# Patient Record
Sex: FEMALE | Race: WHITE | NOT HISPANIC OR LATINO | URBAN - METROPOLITAN AREA
[De-identification: names, ages, dates, MRNs, and addresses within clinical notes are randomized per-mention and may not be internally consistent; named-entity substitution may affect disease eponyms.]

---

## 2021-06-14 LAB
CYTOLOGY CVX/VAG DOC THIN PREP: NORMAL
HPV16+18+45 E6+E7MRNA CVX NAA+PROBE: NEGATIVE

## 2022-12-26 ENCOUNTER — HOSPITAL ENCOUNTER (EMERGENCY)
Age: 37
Discharge: LEFT BEFORE DISCHARGE | End: 2022-12-26

## 2022-12-26 VITALS
RESPIRATION RATE: 16 BRPM | HEART RATE: 111 BPM | DIASTOLIC BLOOD PRESSURE: 71 MMHG | WEIGHT: 150 LBS | TEMPERATURE: 98.6 F | OXYGEN SATURATION: 97 % | SYSTOLIC BLOOD PRESSURE: 103 MMHG

## 2022-12-26 LAB
ALBUMIN SERPL-MCNC: 3.9 G/DL (ref 3.6–5.1)
ALBUMIN/GLOB SERPL: 1.1 {RATIO} (ref 1–2.4)
ALP SERPL-CCNC: 106 UNITS/L (ref 45–117)
ALT SERPL-CCNC: 30 UNITS/L
ANION GAP SERPL CALC-SCNC: 15 MMOL/L (ref 7–19)
APPEARANCE UR: CLEAR
AST SERPL-CCNC: 19 UNITS/L
BASOPHILS # BLD: 0 K/MCL (ref 0–0.3)
BASOPHILS NFR BLD: 0 %
BILIRUB SERPL-MCNC: 0.3 MG/DL (ref 0.2–1)
BILIRUB UR QL STRIP: NEGATIVE
BUN SERPL-MCNC: 10 MG/DL (ref 6–20)
BUN/CREAT SERPL: 18 (ref 7–25)
CALCIUM SERPL-MCNC: 9.3 MG/DL (ref 8.4–10.2)
CHLORIDE SERPL-SCNC: 100 MMOL/L (ref 97–110)
CO2 SERPL-SCNC: 23 MMOL/L (ref 21–32)
COLOR UR: COLORLESS
CREAT SERPL-MCNC: 0.55 MG/DL (ref 0.51–0.95)
DEPRECATED RDW RBC: 44.5 FL (ref 39–50)
EOSINOPHIL # BLD: 0 K/MCL (ref 0–0.5)
EOSINOPHIL NFR BLD: 0 %
ERYTHROCYTE [DISTWIDTH] IN BLOOD: 13.1 % (ref 11–15)
FASTING DURATION TIME PATIENT: ABNORMAL H
GFR SERPLBLD BASED ON 1.73 SQ M-ARVRAT: >90 ML/MIN
GLOBULIN SER-MCNC: 3.4 G/DL (ref 2–4)
GLUCOSE BLDC GLUCOMTR-MCNC: 336 MG/DL (ref 70–99)
GLUCOSE SERPL-MCNC: 403 MG/DL (ref 70–99)
GLUCOSE UR STRIP-MCNC: >1000 MG/DL
HCT VFR BLD CALC: 37.6 % (ref 36–46.5)
HGB BLD-MCNC: 12.8 G/DL (ref 12–15.5)
HGB UR QL STRIP: NEGATIVE
IMM GRANULOCYTES # BLD AUTO: 0 K/MCL (ref 0–0.2)
IMM GRANULOCYTES # BLD: 0 %
KETONES UR STRIP-MCNC: 40 MG/DL
LEUKOCYTE ESTERASE UR QL STRIP: NEGATIVE
LYMPHOCYTES # BLD: 2 K/MCL (ref 1–4.8)
LYMPHOCYTES NFR BLD: 24 %
MCH RBC QN AUTO: 31.6 PG (ref 26–34)
MCHC RBC AUTO-ENTMCNC: 34 G/DL (ref 32–36.5)
MCV RBC AUTO: 92.8 FL (ref 78–100)
MONOCYTES # BLD: 0.4 K/MCL (ref 0.3–0.9)
MONOCYTES NFR BLD: 4 %
NEUTROPHILS # BLD: 5.7 K/MCL (ref 1.8–7.7)
NEUTROPHILS NFR BLD: 72 %
NITRITE UR QL STRIP: NEGATIVE
NRBC BLD MANUAL-RTO: 0 /100 WBC
PH UR STRIP: 6.5 [PH] (ref 5–7)
PLATELET # BLD AUTO: 306 K/MCL (ref 140–450)
POTASSIUM SERPL-SCNC: 4.3 MMOL/L (ref 3.4–5.1)
PROT SERPL-MCNC: 7.3 G/DL (ref 6.4–8.2)
PROT UR STRIP-MCNC: NEGATIVE MG/DL
RBC # BLD: 4.05 MIL/MCL (ref 4–5.2)
SODIUM SERPL-SCNC: 134 MMOL/L (ref 135–145)
SP GR UR STRIP: 1.03 (ref 1–1.03)
UROBILINOGEN UR STRIP-MCNC: 0.2 MG/DL
WBC # BLD: 8.1 K/MCL (ref 4.2–11)

## 2022-12-26 PROCEDURE — 81003 URINALYSIS AUTO W/O SCOPE: CPT | Performed by: EMERGENCY MEDICINE

## 2022-12-26 PROCEDURE — 82962 GLUCOSE BLOOD TEST: CPT

## 2022-12-26 PROCEDURE — 85025 COMPLETE CBC W/AUTO DIFF WBC: CPT | Performed by: EMERGENCY MEDICINE

## 2022-12-26 PROCEDURE — 80053 COMPREHEN METABOLIC PANEL: CPT | Performed by: EMERGENCY MEDICINE

## 2022-12-26 PROCEDURE — 36415 COLL VENOUS BLD VENIPUNCTURE: CPT

## 2022-12-26 PROCEDURE — 10003627 HB COUNTER ED NO SERVICE

## 2023-02-14 ENCOUNTER — HOSPITAL ENCOUNTER (EMERGENCY)
Age: 38
Discharge: HOME | End: 2023-02-15
Attending: EMERGENCY MEDICINE

## 2023-02-14 DIAGNOSIS — K62.5 RECTAL BLEEDING: ICD-10-CM

## 2023-02-14 DIAGNOSIS — R10.84 GENERALIZED ABDOMINAL PAIN: Primary | ICD-10-CM

## 2023-02-14 DIAGNOSIS — E10.65 UNCONTROLLED TYPE 1 DIABETES MELLITUS WITH HYPERGLYCEMIA (CMD): ICD-10-CM

## 2023-02-14 LAB
APPEARANCE UR: CLEAR
B-HCG UR QL: NEGATIVE
B-OH-BUTYR SERPL-SCNC: 0.1 MMOL/L (ref 0–0.3)
BASOPHILS # BLD: 0 K/MCL (ref 0–0.3)
BASOPHILS NFR BLD: 0 %
BILIRUB UR QL STRIP: NEGATIVE
COLOR UR: COLORLESS
DEPRECATED RDW RBC: 43.9 FL (ref 39–50)
EOSINOPHIL # BLD: 0 K/MCL (ref 0–0.5)
EOSINOPHIL NFR BLD: 1 %
ERYTHROCYTE [DISTWIDTH] IN BLOOD: 13.2 % (ref 11–15)
GLUCOSE BLDC GLUCOMTR-MCNC: 573 MG/DL (ref 70–99)
GLUCOSE UR STRIP-MCNC: >1000 MG/DL
HCT VFR BLD CALC: 37.1 % (ref 36–46.5)
HGB BLD-MCNC: 12.5 G/DL (ref 12–15.5)
HGB UR QL STRIP: NEGATIVE
IMM GRANULOCYTES # BLD AUTO: 0 K/MCL (ref 0–0.2)
IMM GRANULOCYTES # BLD: 0 %
INTERNAL PROCEDURAL CONTROLS ACCEPTABLE: YES
KETONES UR STRIP-MCNC: NEGATIVE MG/DL
LEUKOCYTE ESTERASE UR QL STRIP: NEGATIVE
LYMPHOCYTES # BLD: 1.6 K/MCL (ref 1–4.8)
LYMPHOCYTES NFR BLD: 25 %
MCH RBC QN AUTO: 30.5 PG (ref 26–34)
MCHC RBC AUTO-ENTMCNC: 33.7 G/DL (ref 32–36.5)
MCV RBC AUTO: 90.5 FL (ref 78–100)
MONOCYTES # BLD: 0.4 K/MCL (ref 0.3–0.9)
MONOCYTES NFR BLD: 6 %
NEUTROPHILS # BLD: 4.4 K/MCL (ref 1.8–7.7)
NEUTROPHILS NFR BLD: 68 %
NITRITE UR QL STRIP: NEGATIVE
NRBC BLD MANUAL-RTO: 0 /100 WBC
PH UR STRIP: 6.5 [PH] (ref 5–7)
PLATELET # BLD AUTO: 351 K/MCL (ref 140–450)
PROT UR STRIP-MCNC: NEGATIVE MG/DL
RBC # BLD: 4.1 MIL/MCL (ref 4–5.2)
SP GR UR STRIP: >1.03 (ref 1–1.03)
UROBILINOGEN UR STRIP-MCNC: 0.2 MG/DL
WBC # BLD: 6.5 K/MCL (ref 4.2–11)

## 2023-02-14 PROCEDURE — 83690 ASSAY OF LIPASE: CPT | Performed by: STUDENT IN AN ORGANIZED HEALTH CARE EDUCATION/TRAINING PROGRAM

## 2023-02-14 PROCEDURE — 85025 COMPLETE CBC W/AUTO DIFF WBC: CPT | Performed by: STUDENT IN AN ORGANIZED HEALTH CARE EDUCATION/TRAINING PROGRAM

## 2023-02-14 PROCEDURE — 96375 TX/PRO/DX INJ NEW DRUG ADDON: CPT

## 2023-02-14 PROCEDURE — 81025 URINE PREGNANCY TEST: CPT | Performed by: STUDENT IN AN ORGANIZED HEALTH CARE EDUCATION/TRAINING PROGRAM

## 2023-02-14 PROCEDURE — 80053 COMPREHEN METABOLIC PANEL: CPT | Performed by: STUDENT IN AN ORGANIZED HEALTH CARE EDUCATION/TRAINING PROGRAM

## 2023-02-14 PROCEDURE — 99285 EMERGENCY DEPT VISIT HI MDM: CPT

## 2023-02-14 PROCEDURE — 84702 CHORIONIC GONADOTROPIN TEST: CPT | Performed by: STUDENT IN AN ORGANIZED HEALTH CARE EDUCATION/TRAINING PROGRAM

## 2023-02-14 PROCEDURE — 82962 GLUCOSE BLOOD TEST: CPT

## 2023-02-14 PROCEDURE — 96361 HYDRATE IV INFUSION ADD-ON: CPT

## 2023-02-14 PROCEDURE — 82010 KETONE BODYS QUAN: CPT | Performed by: STUDENT IN AN ORGANIZED HEALTH CARE EDUCATION/TRAINING PROGRAM

## 2023-02-14 PROCEDURE — 81003 URINALYSIS AUTO W/O SCOPE: CPT | Performed by: STUDENT IN AN ORGANIZED HEALTH CARE EDUCATION/TRAINING PROGRAM

## 2023-02-14 PROCEDURE — 96374 THER/PROPH/DIAG INJ IV PUSH: CPT

## 2023-02-14 RX ORDER — INSULIN GLARGINE 100 [IU]/ML
22 INJECTION, SOLUTION SUBCUTANEOUS
COMMUNITY
End: 2023-03-01 | Stop reason: ALTCHOICE

## 2023-02-14 RX ORDER — INSULIN LISPRO 100 [IU]/ML
1-9 INJECTION, SOLUTION INTRAVENOUS; SUBCUTANEOUS
COMMUNITY
Start: 2022-10-03 | End: 2023-03-01 | Stop reason: ALTCHOICE

## 2023-02-14 RX ORDER — ESCITALOPRAM OXALATE 20 MG/1
1 TABLET ORAL DAILY
COMMUNITY

## 2023-02-15 ENCOUNTER — APPOINTMENT (OUTPATIENT)
Dept: CT IMAGING | Age: 38
End: 2023-02-15
Attending: EMERGENCY MEDICINE

## 2023-02-15 VITALS
WEIGHT: 155 LBS | HEART RATE: 71 BPM | OXYGEN SATURATION: 98 % | RESPIRATION RATE: 17 BRPM | DIASTOLIC BLOOD PRESSURE: 72 MMHG | SYSTOLIC BLOOD PRESSURE: 101 MMHG | TEMPERATURE: 98 F

## 2023-02-15 LAB
ALBUMIN SERPL-MCNC: 4 G/DL (ref 3.6–5.1)
ALBUMIN/GLOB SERPL: 1.1 {RATIO} (ref 1–2.4)
ALP SERPL-CCNC: 121 UNITS/L (ref 45–117)
ALT SERPL-CCNC: 23 UNITS/L
ANION GAP SERPL CALC-SCNC: 14 MMOL/L (ref 7–19)
AST SERPL-CCNC: 18 UNITS/L
BILIRUB SERPL-MCNC: 0.3 MG/DL (ref 0.2–1)
BUN SERPL-MCNC: 7 MG/DL (ref 6–20)
BUN/CREAT SERPL: 13 (ref 7–25)
CALCIUM SERPL-MCNC: 9.3 MG/DL (ref 8.4–10.2)
CHLORIDE SERPL-SCNC: 97 MMOL/L (ref 97–110)
CO2 SERPL-SCNC: 22 MMOL/L (ref 21–32)
CREAT SERPL-MCNC: 0.56 MG/DL (ref 0.51–0.95)
FASTING DURATION TIME PATIENT: ABNORMAL H
GFR SERPLBLD BASED ON 1.73 SQ M-ARVRAT: >90 ML/MIN
GLOBULIN SER-MCNC: 3.6 G/DL (ref 2–4)
GLUCOSE BLDC GLUCOMTR-MCNC: 319 MG/DL (ref 70–99)
GLUCOSE SERPL-MCNC: 556 MG/DL (ref 70–99)
HCG SERPL-ACNC: <2 MUNITS/ML
LIPASE SERPL-CCNC: 72 UNITS/L (ref 73–393)
POTASSIUM SERPL-SCNC: 4.1 MMOL/L (ref 3.4–5.1)
PROT SERPL-MCNC: 7.6 G/DL (ref 6.4–8.2)
SODIUM SERPL-SCNC: 129 MMOL/L (ref 135–145)

## 2023-02-15 PROCEDURE — 74177 CT ABD & PELVIS W/CONTRAST: CPT

## 2023-02-15 PROCEDURE — 10002800 HB RX 250 W HCPCS: Performed by: EMERGENCY MEDICINE

## 2023-02-15 PROCEDURE — 96374 THER/PROPH/DIAG INJ IV PUSH: CPT

## 2023-02-15 PROCEDURE — 10002805 HB CONTRAST AGENT: Performed by: EMERGENCY MEDICINE

## 2023-02-15 PROCEDURE — 10002807 HB RX 258: Performed by: EMERGENCY MEDICINE

## 2023-02-15 PROCEDURE — G1004 CDSM NDSC: HCPCS

## 2023-02-15 PROCEDURE — 96375 TX/PRO/DX INJ NEW DRUG ADDON: CPT

## 2023-02-15 PROCEDURE — 82962 GLUCOSE BLOOD TEST: CPT

## 2023-02-15 PROCEDURE — 99285 EMERGENCY DEPT VISIT HI MDM: CPT | Performed by: EMERGENCY MEDICINE

## 2023-02-15 PROCEDURE — 10002803 HB RX 637: Performed by: EMERGENCY MEDICINE

## 2023-02-15 RX ORDER — NALOXONE HYDROCHLORIDE 4 MG/.1ML
SPRAY NASAL
Qty: 2 EACH | Refills: 1 | Status: SHIPPED | OUTPATIENT
Start: 2023-02-15 | End: 2023-03-01 | Stop reason: ALTCHOICE

## 2023-02-15 RX ORDER — DICYCLOMINE HYDROCHLORIDE 10 MG/ML
20 INJECTION INTRAMUSCULAR ONCE
Status: DISCONTINUED | OUTPATIENT
Start: 2023-02-15 | End: 2023-02-15

## 2023-02-15 RX ORDER — DICYCLOMINE HYDROCHLORIDE 10 MG/1
10 CAPSULE ORAL ONCE
Status: COMPLETED | OUTPATIENT
Start: 2023-02-15 | End: 2023-02-15

## 2023-02-15 RX ORDER — TRAMADOL HYDROCHLORIDE 50 MG/1
50 TABLET ORAL EVERY 4 HOURS PRN
Qty: 10 TABLET | Refills: 0 | Status: SHIPPED | OUTPATIENT
Start: 2023-02-15 | End: 2023-03-01 | Stop reason: ALTCHOICE

## 2023-02-15 RX ORDER — KETOROLAC TROMETHAMINE 15 MG/ML
15 INJECTION, SOLUTION INTRAMUSCULAR; INTRAVENOUS ONCE
Status: COMPLETED | OUTPATIENT
Start: 2023-02-15 | End: 2023-02-15

## 2023-02-15 RX ORDER — METOCLOPRAMIDE HYDROCHLORIDE 5 MG/ML
10 INJECTION INTRAMUSCULAR; INTRAVENOUS ONCE
Status: COMPLETED | OUTPATIENT
Start: 2023-02-15 | End: 2023-02-15

## 2023-02-15 RX ADMIN — DICYCLOMINE HYDROCHLORIDE 10 MG: 10 CAPSULE ORAL at 01:14

## 2023-02-15 RX ADMIN — SODIUM CHLORIDE 1000 ML: 9 INJECTION, SOLUTION INTRAVENOUS at 00:36

## 2023-02-15 RX ADMIN — METOCLOPRAMIDE 10 MG: 5 INJECTION, SOLUTION INTRAMUSCULAR; INTRAVENOUS at 01:10

## 2023-02-15 RX ADMIN — KETOROLAC TROMETHAMINE 15 MG: 15 INJECTION, SOLUTION INTRAMUSCULAR; INTRAVENOUS at 01:10

## 2023-02-15 RX ADMIN — IOHEXOL 100 ML: 350 INJECTION, SOLUTION INTRAVENOUS at 03:07

## 2023-02-15 RX ADMIN — HYDROMORPHONE HYDROCHLORIDE 1 MG: 1 INJECTION, SOLUTION INTRAMUSCULAR; INTRAVENOUS; SUBCUTANEOUS at 01:22

## 2023-02-15 ASSESSMENT — ENCOUNTER SYMPTOMS
SHORTNESS OF BREATH: 0
DIARRHEA: 0
HEADACHES: 0
COUGH: 0
DIZZINESS: 0
ABDOMINAL PAIN: 1
RHINORRHEA: 0
FEVER: 0
VOMITING: 1
RECTAL PAIN: 1
CHILLS: 0
BACK PAIN: 0
NAUSEA: 1
BRUISES/BLEEDS EASILY: 0

## 2023-02-15 ASSESSMENT — PAIN SCALES - GENERAL
PAINLEVEL_OUTOF10: 8
PAINLEVEL_OUTOF10: 8
PAINLEVEL_OUTOF10: 0
PAINLEVEL_OUTOF10: 8

## 2023-03-01 ENCOUNTER — HOSPITAL ENCOUNTER (INPATIENT)
Age: 38
LOS: 4 days | Discharge: HOME | DRG: 394 | End: 2023-03-07
Attending: STUDENT IN AN ORGANIZED HEALTH CARE EDUCATION/TRAINING PROGRAM | Admitting: FAMILY MEDICINE

## 2023-03-01 DIAGNOSIS — L02.91 ABSCESS: ICD-10-CM

## 2023-03-01 DIAGNOSIS — L98.8 FISTULA: ICD-10-CM

## 2023-03-01 DIAGNOSIS — L73.2 HIDRADENITIS SUPPURATIVA: ICD-10-CM

## 2023-03-01 DIAGNOSIS — K50.118 CROHN'S DISEASE OF COLON WITH OTHER COMPLICATION (CMD): ICD-10-CM

## 2023-03-01 DIAGNOSIS — L73.2 HIDRADENITIS: Primary | ICD-10-CM

## 2023-03-01 DIAGNOSIS — R73.9 HYPERGLYCEMIA: ICD-10-CM

## 2023-03-01 LAB
ALBUMIN SERPL-MCNC: 3.9 G/DL (ref 3.6–5.1)
ALBUMIN/GLOB SERPL: 1.1 {RATIO} (ref 1–2.4)
ALP SERPL-CCNC: 107 UNITS/L (ref 45–117)
ALT SERPL-CCNC: 34 UNITS/L
ANION GAP SERPL CALC-SCNC: 15 MMOL/L (ref 7–19)
APPEARANCE UR: CLEAR
AST SERPL-CCNC: 10 UNITS/L
B-HCG UR QL: NEGATIVE
BASOPHILS # BLD: 0 K/MCL (ref 0–0.3)
BASOPHILS NFR BLD: 0 %
BILIRUB SERPL-MCNC: 0.3 MG/DL (ref 0.2–1)
BILIRUB UR QL STRIP: NEGATIVE
BUN SERPL-MCNC: 10 MG/DL (ref 6–20)
BUN/CREAT SERPL: 19 (ref 7–25)
CALCIUM SERPL-MCNC: 9.2 MG/DL (ref 8.4–10.2)
CHLORIDE SERPL-SCNC: 100 MMOL/L (ref 97–110)
CO2 SERPL-SCNC: 24 MMOL/L (ref 21–32)
COLOR UR: ABNORMAL
CREAT SERPL-MCNC: 0.53 MG/DL (ref 0.51–0.95)
DEPRECATED RDW RBC: 43 FL (ref 39–50)
EOSINOPHIL # BLD: 0 K/MCL (ref 0–0.5)
EOSINOPHIL NFR BLD: 0 %
ERYTHROCYTE [DISTWIDTH] IN BLOOD: 12.9 % (ref 11–15)
FASTING DURATION TIME PATIENT: ABNORMAL H
GFR SERPLBLD BASED ON 1.73 SQ M-ARVRAT: >90 ML/MIN
GLOBULIN SER-MCNC: 3.6 G/DL (ref 2–4)
GLUCOSE BLDC GLUCOMTR-MCNC: 263 MG/DL (ref 70–99)
GLUCOSE BLDC GLUCOMTR-MCNC: 346 MG/DL (ref 70–99)
GLUCOSE BLDC GLUCOMTR-MCNC: 498 MG/DL (ref 70–99)
GLUCOSE BLDC GLUCOMTR-MCNC: 550 MG/DL (ref 70–99)
GLUCOSE SERPL-MCNC: 373 MG/DL (ref 70–99)
GLUCOSE UR STRIP-MCNC: >1000 MG/DL
HCT VFR BLD CALC: 37.1 % (ref 36–46.5)
HGB BLD-MCNC: 12.2 G/DL (ref 12–15.5)
HGB UR QL STRIP: NEGATIVE
IMM GRANULOCYTES # BLD AUTO: 0 K/MCL (ref 0–0.2)
IMM GRANULOCYTES # BLD: 0 %
INTERNAL PROCEDURAL CONTROLS ACCEPTABLE: YES
KETONES UR STRIP-MCNC: NEGATIVE MG/DL
LEUKOCYTE ESTERASE UR QL STRIP: NEGATIVE
LIPASE SERPL-CCNC: 50 UNITS/L (ref 73–393)
LYMPHOCYTES # BLD: 1.7 K/MCL (ref 1–4.8)
LYMPHOCYTES NFR BLD: 26 %
MCH RBC QN AUTO: 30.3 PG (ref 26–34)
MCHC RBC AUTO-ENTMCNC: 32.9 G/DL (ref 32–36.5)
MCV RBC AUTO: 92.1 FL (ref 78–100)
MONOCYTES # BLD: 0.4 K/MCL (ref 0.3–0.9)
MONOCYTES NFR BLD: 7 %
NEUTROPHILS # BLD: 4.3 K/MCL (ref 1.8–7.7)
NEUTROPHILS NFR BLD: 67 %
NITRITE UR QL STRIP: NEGATIVE
NRBC BLD MANUAL-RTO: 0 /100 WBC
PH UR STRIP: 5.5 [PH] (ref 5–7)
PLATELET # BLD AUTO: 383 K/MCL (ref 140–450)
POTASSIUM SERPL-SCNC: 4.1 MMOL/L (ref 3.4–5.1)
PROT SERPL-MCNC: 7.5 G/DL (ref 6.4–8.2)
PROT UR STRIP-MCNC: NEGATIVE MG/DL
RBC # BLD: 4.03 MIL/MCL (ref 4–5.2)
SODIUM SERPL-SCNC: 135 MMOL/L (ref 135–145)
SP GR UR STRIP: >1.03 (ref 1–1.03)
UROBILINOGEN UR STRIP-MCNC: 0.2 MG/DL
WBC # BLD: 6.4 K/MCL (ref 4.2–11)

## 2023-03-01 PROCEDURE — 10002800 HB RX 250 W HCPCS: Performed by: FAMILY MEDICINE

## 2023-03-01 PROCEDURE — 10004651 HB RX, NO CHARGE ITEM: Performed by: STUDENT IN AN ORGANIZED HEALTH CARE EDUCATION/TRAINING PROGRAM

## 2023-03-01 PROCEDURE — 82962 GLUCOSE BLOOD TEST: CPT

## 2023-03-01 PROCEDURE — G0378 HOSPITAL OBSERVATION PER HR: HCPCS

## 2023-03-01 PROCEDURE — 96374 THER/PROPH/DIAG INJ IV PUSH: CPT

## 2023-03-01 PROCEDURE — 99285 EMERGENCY DEPT VISIT HI MDM: CPT | Performed by: STUDENT IN AN ORGANIZED HEALTH CARE EDUCATION/TRAINING PROGRAM

## 2023-03-01 PROCEDURE — 81025 URINE PREGNANCY TEST: CPT | Performed by: EMERGENCY MEDICINE

## 2023-03-01 PROCEDURE — 80053 COMPREHEN METABOLIC PANEL: CPT | Performed by: EMERGENCY MEDICINE

## 2023-03-01 PROCEDURE — 81003 URINALYSIS AUTO W/O SCOPE: CPT | Performed by: EMERGENCY MEDICINE

## 2023-03-01 PROCEDURE — 96361 HYDRATE IV INFUSION ADD-ON: CPT

## 2023-03-01 PROCEDURE — 10002801 HB RX 250 W/O HCPCS: Performed by: FAMILY MEDICINE

## 2023-03-01 PROCEDURE — 85025 COMPLETE CBC W/AUTO DIFF WBC: CPT | Performed by: EMERGENCY MEDICINE

## 2023-03-01 PROCEDURE — 96376 TX/PRO/DX INJ SAME DRUG ADON: CPT

## 2023-03-01 PROCEDURE — 10002807 HB RX 258: Performed by: FAMILY MEDICINE

## 2023-03-01 PROCEDURE — 10002800 HB RX 250 W HCPCS: Performed by: STUDENT IN AN ORGANIZED HEALTH CARE EDUCATION/TRAINING PROGRAM

## 2023-03-01 PROCEDURE — 99285 EMERGENCY DEPT VISIT HI MDM: CPT

## 2023-03-01 PROCEDURE — 96375 TX/PRO/DX INJ NEW DRUG ADDON: CPT

## 2023-03-01 PROCEDURE — 83690 ASSAY OF LIPASE: CPT | Performed by: STUDENT IN AN ORGANIZED HEALTH CARE EDUCATION/TRAINING PROGRAM

## 2023-03-01 PROCEDURE — 10002807 HB RX 258: Performed by: STUDENT IN AN ORGANIZED HEALTH CARE EDUCATION/TRAINING PROGRAM

## 2023-03-01 PROCEDURE — 96365 THER/PROPH/DIAG IV INF INIT: CPT

## 2023-03-01 RX ORDER — CLINDAMYCIN PHOSPHATE 600 MG/50ML
600 INJECTION INTRAVENOUS EVERY 8 HOURS SCHEDULED
Status: DISCONTINUED | OUTPATIENT
Start: 2023-03-01 | End: 2023-03-02

## 2023-03-01 RX ORDER — ONDANSETRON 2 MG/ML
4 INJECTION INTRAMUSCULAR; INTRAVENOUS EVERY 6 HOURS PRN
Status: DISCONTINUED | OUTPATIENT
Start: 2023-03-01 | End: 2023-03-07 | Stop reason: HOSPADM

## 2023-03-01 RX ORDER — KETOROLAC TROMETHAMINE 15 MG/ML
15 INJECTION, SOLUTION INTRAMUSCULAR; INTRAVENOUS ONCE
Status: COMPLETED | OUTPATIENT
Start: 2023-03-01 | End: 2023-03-01

## 2023-03-01 RX ORDER — NICOTINE POLACRILEX 4 MG
30 LOZENGE BUCCAL PRN
Status: DISCONTINUED | OUTPATIENT
Start: 2023-03-01 | End: 2023-03-07 | Stop reason: HOSPADM

## 2023-03-01 RX ORDER — DOCUSATE SODIUM 100 MG/1
100 CAPSULE, LIQUID FILLED ORAL 2 TIMES DAILY PRN
Status: DISCONTINUED | OUTPATIENT
Start: 2023-03-01 | End: 2023-03-07 | Stop reason: HOSPADM

## 2023-03-01 RX ORDER — LANOLIN ALCOHOL/MO/W.PET/CERES
3 CREAM (GRAM) TOPICAL NIGHTLY PRN
Status: DISCONTINUED | OUTPATIENT
Start: 2023-03-01 | End: 2023-03-07 | Stop reason: HOSPADM

## 2023-03-01 RX ORDER — ONDANSETRON 2 MG/ML
4 INJECTION INTRAMUSCULAR; INTRAVENOUS ONCE
Status: COMPLETED | OUTPATIENT
Start: 2023-03-01 | End: 2023-03-01

## 2023-03-01 RX ORDER — NICOTINE POLACRILEX 4 MG
15 LOZENGE BUCCAL PRN
Status: DISCONTINUED | OUTPATIENT
Start: 2023-03-01 | End: 2023-03-07 | Stop reason: HOSPADM

## 2023-03-01 RX ORDER — DEXTROSE MONOHYDRATE 25 G/50ML
12.5 INJECTION, SOLUTION INTRAVENOUS PRN
Status: DISCONTINUED | OUTPATIENT
Start: 2023-03-01 | End: 2023-03-07 | Stop reason: HOSPADM

## 2023-03-01 RX ORDER — SODIUM CHLORIDE, SODIUM LACTATE, POTASSIUM CHLORIDE, CALCIUM CHLORIDE 600; 310; 30; 20 MG/100ML; MG/100ML; MG/100ML; MG/100ML
INJECTION, SOLUTION INTRAVENOUS CONTINUOUS
Status: DISCONTINUED | OUTPATIENT
Start: 2023-03-01 | End: 2023-03-02

## 2023-03-01 RX ORDER — POLYETHYLENE GLYCOL 3350 17 G/17G
17 POWDER, FOR SOLUTION ORAL DAILY PRN
Status: DISCONTINUED | OUTPATIENT
Start: 2023-03-01 | End: 2023-03-07 | Stop reason: HOSPADM

## 2023-03-01 RX ORDER — 0.9 % SODIUM CHLORIDE 0.9 %
2 VIAL (ML) INJECTION EVERY 12 HOURS SCHEDULED
Status: DISCONTINUED | OUTPATIENT
Start: 2023-03-01 | End: 2023-03-07 | Stop reason: HOSPADM

## 2023-03-01 RX ORDER — ACETAMINOPHEN 325 MG/1
650 TABLET ORAL EVERY 4 HOURS PRN
Status: DISCONTINUED | OUTPATIENT
Start: 2023-03-01 | End: 2023-03-02

## 2023-03-01 RX ORDER — ACETAMINOPHEN 325 MG/1
650 TABLET ORAL ONCE
Status: COMPLETED | OUTPATIENT
Start: 2023-03-01 | End: 2023-03-01

## 2023-03-01 RX ORDER — DEXTROSE MONOHYDRATE 25 G/50ML
25 INJECTION, SOLUTION INTRAVENOUS PRN
Status: DISCONTINUED | OUTPATIENT
Start: 2023-03-01 | End: 2023-03-07 | Stop reason: HOSPADM

## 2023-03-01 RX ORDER — SIMETHICONE 125 MG
125 TABLET,CHEWABLE ORAL 4 TIMES DAILY PRN
Status: DISCONTINUED | OUTPATIENT
Start: 2023-03-01 | End: 2023-03-07 | Stop reason: HOSPADM

## 2023-03-01 RX ORDER — GUAIFENESIN 200 MG/10ML
200 LIQUID ORAL EVERY 4 HOURS PRN
Status: DISCONTINUED | OUTPATIENT
Start: 2023-03-01 | End: 2023-03-07 | Stop reason: HOSPADM

## 2023-03-01 RX ORDER — KETOROLAC TROMETHAMINE 15 MG/ML
15 INJECTION, SOLUTION INTRAMUSCULAR; INTRAVENOUS EVERY 8 HOURS PRN
Status: DISCONTINUED | OUTPATIENT
Start: 2023-03-01 | End: 2023-03-02

## 2023-03-01 RX ADMIN — ACETAMINOPHEN 650 MG: 325 TABLET ORAL at 19:58

## 2023-03-01 RX ADMIN — SODIUM CHLORIDE, POTASSIUM CHLORIDE, SODIUM LACTATE AND CALCIUM CHLORIDE: 600; 310; 30; 20 INJECTION, SOLUTION INTRAVENOUS at 22:10

## 2023-03-01 RX ADMIN — ONDANSETRON 4 MG: 2 INJECTION INTRAMUSCULAR; INTRAVENOUS at 18:13

## 2023-03-01 RX ADMIN — KETOROLAC TROMETHAMINE 15 MG: 15 INJECTION, SOLUTION INTRAMUSCULAR; INTRAVENOUS at 19:57

## 2023-03-01 RX ADMIN — CLINDAMYCIN PHOSPHATE 600 MG: 600 INJECTION, SOLUTION INTRAVENOUS at 23:24

## 2023-03-01 RX ADMIN — SODIUM CHLORIDE, POTASSIUM CHLORIDE, SODIUM LACTATE AND CALCIUM CHLORIDE 1000 ML: 600; 310; 30; 20 INJECTION, SOLUTION INTRAVENOUS at 18:17

## 2023-03-01 RX ADMIN — HYDROMORPHONE HYDROCHLORIDE 0.2 MG: 1 INJECTION, SOLUTION INTRAMUSCULAR; INTRAVENOUS; SUBCUTANEOUS at 22:30

## 2023-03-01 RX ADMIN — SODIUM CHLORIDE 1000 ML: 9 INJECTION, SOLUTION INTRAVENOUS at 22:17

## 2023-03-01 RX ADMIN — SODIUM CHLORIDE, POTASSIUM CHLORIDE, SODIUM LACTATE AND CALCIUM CHLORIDE 1000 ML: 600; 310; 30; 20 INJECTION, SOLUTION INTRAVENOUS at 19:57

## 2023-03-01 RX ADMIN — HYDROMORPHONE HYDROCHLORIDE 1 MG: 1 INJECTION, SOLUTION INTRAMUSCULAR; INTRAVENOUS; SUBCUTANEOUS at 19:03

## 2023-03-01 RX ADMIN — INSULIN HUMAN 10 UNITS: 100 INJECTION, SOLUTION PARENTERAL at 18:15

## 2023-03-01 RX ADMIN — INSULIN LISPRO 2 UNITS: 100 INJECTION, SOLUTION INTRAVENOUS; SUBCUTANEOUS at 23:38

## 2023-03-01 SDOH — SOCIAL STABILITY: SOCIAL NETWORK: SUPPORT SYSTEMS: SPOUSE;FAMILY MEMBERS

## 2023-03-01 SDOH — ECONOMIC STABILITY: HOUSING INSECURITY: WHAT IS YOUR LIVING SITUATION TODAY?: SPOUSE;CHILDREN

## 2023-03-01 SDOH — HEALTH STABILITY: PHYSICAL HEALTH: DO YOU HAVE SERIOUS DIFFICULTY WALKING OR CLIMBING STAIRS?: NO

## 2023-03-01 SDOH — ECONOMIC STABILITY: GENERAL

## 2023-03-01 SDOH — SOCIAL STABILITY: SOCIAL NETWORK
HOW OFTEN DO YOU SEE OR TALK TO PEOPLE THAT YOU CARE ABOUT AND FEEL CLOSE TO? (FOR EXAMPLE: TALKING TO FRIENDS ON THE PHONE, VISITING FRIENDS OR FAMILY, GOING TO CHURCH OR CLUB MEETINGS): 5 OR MORE TIMES A WEEK

## 2023-03-01 SDOH — ECONOMIC STABILITY: HOUSING INSECURITY: ARE YOU WORRIED ABOUT LOSING YOUR HOUSING?: NO

## 2023-03-01 SDOH — HEALTH STABILITY: GENERAL: BECAUSE OF A PHYSICAL, MENTAL, OR EMOTIONAL CONDITION, DO YOU HAVE DIFFICULTY DOING ERRANDS ALONE?: NO

## 2023-03-01 SDOH — HEALTH STABILITY: PHYSICAL HEALTH: DO YOU HAVE DIFFICULTY DRESSING OR BATHING?: NO

## 2023-03-01 SDOH — ECONOMIC STABILITY: FOOD INSECURITY: HOW OFTEN IN THE PAST 12 MONTHS WERE YOU WORRIED OR STRESSED ABOUT HAVING ENOUGH MONEY TO BUY NUTRITIOUS MEALS?: NEVER

## 2023-03-01 SDOH — ECONOMIC STABILITY: TRANSPORTATION INSECURITY
IN THE PAST 12 MONTHS, HAS LACK OF TRANSPORTATION KEPT YOU FROM MEETINGS, WORK, OR FROM GETTING THINGS NEEDED FOR DAILY LIVING?: NO

## 2023-03-01 SDOH — HEALTH STABILITY: GENERAL
BECAUSE OF A PHYSICAL, MENTAL, OR EMOTIONAL CONDITION, DO YOU HAVE SERIOUS DIFFICULTY CONCENTRATING, REMEMBERING OR MAKING DECISIONS?: NO

## 2023-03-01 SDOH — ECONOMIC STABILITY: HOUSING INSECURITY: WHAT IS YOUR LIVING SITUATION TODAY?: HOUSE

## 2023-03-01 SDOH — ECONOMIC STABILITY: TRANSPORTATION INSECURITY
IN THE PAST 12 MONTHS, HAS THE LACK OF TRANSPORTATION KEPT YOU FROM MEDICAL APPOINTMENTS OR FROM GETTING MEDICATIONS?: NO

## 2023-03-01 ASSESSMENT — ACTIVITIES OF DAILY LIVING (ADL)
ADL_SCORE: 12
ADL_SHORT_OF_BREATH: NO
ADL_BEFORE_ADMISSION: INDEPENDENT
RECENT_DECLINE_ADL: NO

## 2023-03-01 ASSESSMENT — PAIN SCALES - GENERAL
PAINLEVEL_OUTOF10: 10
PAINLEVEL_OUTOF10: 8

## 2023-03-01 ASSESSMENT — LIFESTYLE VARIABLES
HOW OFTEN DO YOU HAVE A DRINK CONTAINING ALCOHOL: NEVER
HOW OFTEN DO YOU HAVE 6 OR MORE DRINKS ON ONE OCCASION: NEVER
AUDIT-C TOTAL SCORE: 0
HOW MANY STANDARD DRINKS CONTAINING ALCOHOL DO YOU HAVE ON A TYPICAL DAY: 0,1 OR 2
ALCOHOL_USE_STATUS: NO OR LOW RISK WITH VALIDATED TOOL

## 2023-03-01 ASSESSMENT — PATIENT HEALTH QUESTIONNAIRE - PHQ9
SUM OF ALL RESPONSES TO PHQ9 QUESTIONS 1 AND 2: 0
IS PATIENT ABLE TO COMPLETE PHQ2 OR PHQ9: YES
2. FEELING DOWN, DEPRESSED OR HOPELESS: NOT AT ALL
CLINICAL INTERPRETATION OF PHQ2 SCORE: NO FURTHER SCREENING NEEDED
SUM OF ALL RESPONSES TO PHQ9 QUESTIONS 1 AND 2: 0
1. LITTLE INTEREST OR PLEASURE IN DOING THINGS: NOT AT ALL

## 2023-03-01 ASSESSMENT — COLUMBIA-SUICIDE SEVERITY RATING SCALE - C-SSRS
1. WITHIN THE PAST MONTH, HAVE YOU WISHED YOU WERE DEAD OR WISHED YOU COULD GO TO SLEEP AND NOT WAKE UP?: NO
IS THE PATIENT ABLE TO COMPLETE C-SSRS: YES
2. HAVE YOU ACTUALLY HAD ANY THOUGHTS OF KILLING YOURSELF?: NO
6. HAVE YOU EVER DONE ANYTHING, STARTED TO DO ANYTHING, OR PREPARED TO DO ANYTHING TO END YOUR LIFE?: NO

## 2023-03-02 ENCOUNTER — APPOINTMENT (OUTPATIENT)
Dept: CT IMAGING | Age: 38
DRG: 394 | End: 2023-03-02
Attending: INTERNAL MEDICINE

## 2023-03-02 LAB
ALBUMIN SERPL-MCNC: 2.9 G/DL (ref 3.6–5.1)
ALBUMIN/GLOB SERPL: 1 {RATIO} (ref 1–2.4)
ALP SERPL-CCNC: 90 UNITS/L (ref 45–117)
ALT SERPL-CCNC: 24 UNITS/L
ANION GAP SERPL CALC-SCNC: 10 MMOL/L (ref 7–19)
AST SERPL-CCNC: 10 UNITS/L
BASOPHILS # BLD: 0 K/MCL (ref 0–0.3)
BASOPHILS NFR BLD: 1 %
BILIRUB SERPL-MCNC: 0.3 MG/DL (ref 0.2–1)
BUN SERPL-MCNC: 12 MG/DL (ref 6–20)
BUN/CREAT SERPL: 21 (ref 7–25)
CALCIUM SERPL-MCNC: 8.4 MG/DL (ref 8.4–10.2)
CHLORIDE SERPL-SCNC: 105 MMOL/L (ref 97–110)
CK SERPL-CCNC: 68 UNITS/L (ref 26–192)
CO2 SERPL-SCNC: 26 MMOL/L (ref 21–32)
CREAT SERPL-MCNC: 0.56 MG/DL (ref 0.51–0.95)
DEPRECATED RDW RBC: 46.3 FL (ref 39–50)
EOSINOPHIL # BLD: 0.1 K/MCL (ref 0–0.5)
EOSINOPHIL NFR BLD: 1 %
ERYTHROCYTE [DISTWIDTH] IN BLOOD: 13.2 % (ref 11–15)
FASTING DURATION TIME PATIENT: ABNORMAL H
GFR SERPLBLD BASED ON 1.73 SQ M-ARVRAT: >90 ML/MIN
GLOBULIN SER-MCNC: 2.9 G/DL (ref 2–4)
GLUCOSE BLDC GLUCOMTR-MCNC: 244 MG/DL (ref 70–99)
GLUCOSE BLDC GLUCOMTR-MCNC: 261 MG/DL (ref 70–99)
GLUCOSE BLDC GLUCOMTR-MCNC: 323 MG/DL (ref 70–99)
GLUCOSE BLDC GLUCOMTR-MCNC: 422 MG/DL (ref 70–99)
GLUCOSE SERPL-MCNC: 374 MG/DL (ref 70–99)
HBA1C MFR BLD: 9.6 % (ref 4.5–5.6)
HCT VFR BLD CALC: 32.2 % (ref 36–46.5)
HGB BLD-MCNC: 10.3 G/DL (ref 12–15.5)
HYPOCHROMIA BLD QL SMEAR: NORMAL
IMM GRANULOCYTES # BLD AUTO: 0 K/MCL (ref 0–0.2)
IMM GRANULOCYTES # BLD: 0 %
LYMPHOCYTES # BLD: 2.1 K/MCL (ref 1–4.8)
LYMPHOCYTES NFR BLD: 44 %
MAGNESIUM SERPL-MCNC: 1.7 MG/DL (ref 1.7–2.4)
MCH RBC QN AUTO: 30.6 PG (ref 26–34)
MCHC RBC AUTO-ENTMCNC: 32 G/DL (ref 32–36.5)
MCV RBC AUTO: 95.5 FL (ref 78–100)
MICROCYTES BLD QL SMEAR: NORMAL
MONOCYTES # BLD: 0.4 K/MCL (ref 0.3–0.9)
MONOCYTES NFR BLD: 9 %
NEUTROPHILS # BLD: 2.2 K/MCL (ref 1.8–7.7)
NEUTROPHILS NFR BLD: 45 %
NRBC BLD MANUAL-RTO: 0 /100 WBC
OVALOCYTES BLD QL SMEAR: NORMAL
PLAT MORPH BLD: NORMAL
PLATELET # BLD AUTO: 337 K/MCL (ref 140–450)
POTASSIUM SERPL-SCNC: 4.2 MMOL/L (ref 3.4–5.1)
PROT SERPL-MCNC: 5.8 G/DL (ref 6.4–8.2)
RBC # BLD: 3.37 MIL/MCL (ref 4–5.2)
SODIUM SERPL-SCNC: 137 MMOL/L (ref 135–145)
WBC # BLD: 4.8 K/MCL (ref 4.2–11)

## 2023-03-02 PROCEDURE — 85025 COMPLETE CBC W/AUTO DIFF WBC: CPT | Performed by: FAMILY MEDICINE

## 2023-03-02 PROCEDURE — 10002800 HB RX 250 W HCPCS: Performed by: STUDENT IN AN ORGANIZED HEALTH CARE EDUCATION/TRAINING PROGRAM

## 2023-03-02 PROCEDURE — 80053 COMPREHEN METABOLIC PANEL: CPT | Performed by: FAMILY MEDICINE

## 2023-03-02 PROCEDURE — G0378 HOSPITAL OBSERVATION PER HR: HCPCS

## 2023-03-02 PROCEDURE — 10002800 HB RX 250 W HCPCS: Performed by: FAMILY MEDICINE

## 2023-03-02 PROCEDURE — 10002800 HB RX 250 W HCPCS: Performed by: INTERNAL MEDICINE

## 2023-03-02 PROCEDURE — 74177 CT ABD & PELVIS W/CONTRAST: CPT

## 2023-03-02 PROCEDURE — 10004651 HB RX, NO CHARGE ITEM: Performed by: FAMILY MEDICINE

## 2023-03-02 PROCEDURE — 10002807 HB RX 258: Performed by: INTERNAL MEDICINE

## 2023-03-02 PROCEDURE — 99254 IP/OBS CNSLTJ NEW/EST MOD 60: CPT | Performed by: NURSE PRACTITIONER

## 2023-03-02 PROCEDURE — 83036 HEMOGLOBIN GLYCOSYLATED A1C: CPT | Performed by: FAMILY MEDICINE

## 2023-03-02 PROCEDURE — 10002807 HB RX 258: Performed by: FAMILY MEDICINE

## 2023-03-02 PROCEDURE — 82962 GLUCOSE BLOOD TEST: CPT

## 2023-03-02 PROCEDURE — 36415 COLL VENOUS BLD VENIPUNCTURE: CPT | Performed by: FAMILY MEDICINE

## 2023-03-02 PROCEDURE — G1004 CDSM NDSC: HCPCS

## 2023-03-02 PROCEDURE — 96372 THER/PROPH/DIAG INJ SC/IM: CPT

## 2023-03-02 PROCEDURE — 10002805 HB CONTRAST AGENT: Performed by: INTERNAL MEDICINE

## 2023-03-02 PROCEDURE — 83735 ASSAY OF MAGNESIUM: CPT | Performed by: FAMILY MEDICINE

## 2023-03-02 PROCEDURE — 10002801 HB RX 250 W/O HCPCS: Performed by: FAMILY MEDICINE

## 2023-03-02 PROCEDURE — 96375 TX/PRO/DX INJ NEW DRUG ADDON: CPT

## 2023-03-02 PROCEDURE — 82550 ASSAY OF CK (CPK): CPT | Performed by: INTERNAL MEDICINE

## 2023-03-02 PROCEDURE — 96376 TX/PRO/DX INJ SAME DRUG ADON: CPT

## 2023-03-02 PROCEDURE — 99223 1ST HOSP IP/OBS HIGH 75: CPT | Performed by: STUDENT IN AN ORGANIZED HEALTH CARE EDUCATION/TRAINING PROGRAM

## 2023-03-02 RX ORDER — INSULIN GLARGINE 100 [IU]/ML
10 INJECTION, SOLUTION SUBCUTANEOUS DAILY
Status: DISCONTINUED | OUTPATIENT
Start: 2023-03-02 | End: 2023-03-03

## 2023-03-02 RX ORDER — ACETAMINOPHEN 325 MG/1
650 TABLET ORAL EVERY 4 HOURS PRN
Status: DISCONTINUED | OUTPATIENT
Start: 2023-03-02 | End: 2023-03-07 | Stop reason: HOSPADM

## 2023-03-02 RX ORDER — DAPTOMYCIN 50 MG/ML
6 INJECTION, POWDER, LYOPHILIZED, FOR SOLUTION INTRAVENOUS EVERY 24 HOURS
Status: DISCONTINUED | OUTPATIENT
Start: 2023-03-02 | End: 2023-03-03

## 2023-03-02 RX ORDER — KETOROLAC TROMETHAMINE 15 MG/ML
15 INJECTION, SOLUTION INTRAMUSCULAR; INTRAVENOUS EVERY 8 HOURS PRN
Status: DISPENSED | OUTPATIENT
Start: 2023-03-02 | End: 2023-03-06

## 2023-03-02 RX ORDER — HEPARIN SODIUM 5000 [USP'U]/ML
5000 INJECTION, SOLUTION INTRAVENOUS; SUBCUTANEOUS EVERY 8 HOURS SCHEDULED
Status: DISCONTINUED | OUTPATIENT
Start: 2023-03-02 | End: 2023-03-07 | Stop reason: HOSPADM

## 2023-03-02 RX ADMIN — IOHEXOL 75 ML: 350 INJECTION, SOLUTION INTRAVENOUS at 20:07

## 2023-03-02 RX ADMIN — INSULIN GLARGINE 10 UNITS: 100 INJECTION, SOLUTION SUBCUTANEOUS at 16:37

## 2023-03-02 RX ADMIN — PIPERACILLIN SODIUM AND TAZOBACTAM SODIUM 3.38 G: 3; .375 INJECTION, POWDER, FOR SOLUTION INTRAVENOUS at 21:36

## 2023-03-02 RX ADMIN — DAPTOMYCIN 360 MG: 500 INJECTION, POWDER, LYOPHILIZED, FOR SOLUTION INTRAVENOUS at 16:38

## 2023-03-02 RX ADMIN — HYDROMORPHONE HYDROCHLORIDE 0.4 MG: 1 INJECTION, SOLUTION INTRAMUSCULAR; INTRAVENOUS; SUBCUTANEOUS at 12:22

## 2023-03-02 RX ADMIN — HEPARIN SODIUM 5000 UNITS: 5000 INJECTION INTRAVENOUS; SUBCUTANEOUS at 16:37

## 2023-03-02 RX ADMIN — CLINDAMYCIN PHOSPHATE 600 MG: 600 INJECTION, SOLUTION INTRAVENOUS at 06:24

## 2023-03-02 RX ADMIN — HYDROMORPHONE HYDROCHLORIDE 0.2 MG: 1 INJECTION, SOLUTION INTRAMUSCULAR; INTRAVENOUS; SUBCUTANEOUS at 03:25

## 2023-03-02 RX ADMIN — INSULIN LISPRO 8 UNITS: 100 INJECTION, SOLUTION INTRAVENOUS; SUBCUTANEOUS at 16:42

## 2023-03-02 RX ADMIN — SODIUM CHLORIDE, PRESERVATIVE FREE 2 ML: 5 INJECTION INTRAVENOUS at 08:39

## 2023-03-02 RX ADMIN — HYDROMORPHONE HYDROCHLORIDE 0.2 MG: 1 INJECTION, SOLUTION INTRAMUSCULAR; INTRAVENOUS; SUBCUTANEOUS at 08:36

## 2023-03-02 RX ADMIN — HYDROMORPHONE HYDROCHLORIDE 0.4 MG: 1 INJECTION, SOLUTION INTRAMUSCULAR; INTRAVENOUS; SUBCUTANEOUS at 16:42

## 2023-03-02 RX ADMIN — SODIUM CHLORIDE, POTASSIUM CHLORIDE, SODIUM LACTATE AND CALCIUM CHLORIDE: 600; 310; 30; 20 INJECTION, SOLUTION INTRAVENOUS at 03:32

## 2023-03-02 RX ADMIN — HYDROMORPHONE HYDROCHLORIDE 0.4 MG: 1 INJECTION, SOLUTION INTRAMUSCULAR; INTRAVENOUS; SUBCUTANEOUS at 19:30

## 2023-03-02 RX ADMIN — HYDROMORPHONE HYDROCHLORIDE 0.2 MG: 1 INJECTION, SOLUTION INTRAMUSCULAR; INTRAVENOUS; SUBCUTANEOUS at 00:47

## 2023-03-02 RX ADMIN — PIPERACILLIN SODIUM AND TAZOBACTAM SODIUM 3.38 G: 3; .375 INJECTION, POWDER, FOR SOLUTION INTRAVENOUS at 12:18

## 2023-03-02 RX ADMIN — INSULIN LISPRO 10 UNITS: 100 INJECTION, SOLUTION INTRAVENOUS; SUBCUTANEOUS at 12:28

## 2023-03-02 ASSESSMENT — PATIENT HEALTH QUESTIONNAIRE - PHQ9
CLINICAL INTERPRETATION OF PHQ2 SCORE: NO FURTHER SCREENING NEEDED
SUM OF ALL RESPONSES TO PHQ9 QUESTIONS 1 AND 2: 0
IS PATIENT ABLE TO COMPLETE PHQ2 OR PHQ9: YES

## 2023-03-02 ASSESSMENT — PAIN SCALES - GENERAL
PAINLEVEL_OUTOF10: 7
PAINLEVEL_OUTOF10: 6
PAINLEVEL_OUTOF10: 7
PAINLEVEL_OUTOF10: 6

## 2023-03-03 LAB
GLUCOSE BLDC GLUCOMTR-MCNC: 116 MG/DL (ref 70–99)
GLUCOSE BLDC GLUCOMTR-MCNC: 214 MG/DL (ref 70–99)
GLUCOSE BLDC GLUCOMTR-MCNC: 242 MG/DL (ref 70–99)
GLUCOSE BLDC GLUCOMTR-MCNC: 303 MG/DL (ref 70–99)

## 2023-03-03 PROCEDURE — 10004281 HB COUNTER-STAFF TIME PER 15 MIN

## 2023-03-03 PROCEDURE — 13003287

## 2023-03-03 PROCEDURE — 10002807 HB RX 258: Performed by: INTERNAL MEDICINE

## 2023-03-03 PROCEDURE — 99233 SBSQ HOSP IP/OBS HIGH 50: CPT | Performed by: STUDENT IN AN ORGANIZED HEALTH CARE EDUCATION/TRAINING PROGRAM

## 2023-03-03 PROCEDURE — 10004651 HB RX, NO CHARGE ITEM: Performed by: FAMILY MEDICINE

## 2023-03-03 PROCEDURE — 10002800 HB RX 250 W HCPCS: Performed by: INTERNAL MEDICINE

## 2023-03-03 PROCEDURE — 10002803 HB RX 637: Performed by: STUDENT IN AN ORGANIZED HEALTH CARE EDUCATION/TRAINING PROGRAM

## 2023-03-03 PROCEDURE — 10002800 HB RX 250 W HCPCS: Performed by: STUDENT IN AN ORGANIZED HEALTH CARE EDUCATION/TRAINING PROGRAM

## 2023-03-03 PROCEDURE — 82962 GLUCOSE BLOOD TEST: CPT

## 2023-03-03 PROCEDURE — 96376 TX/PRO/DX INJ SAME DRUG ADON: CPT

## 2023-03-03 PROCEDURE — 99233 SBSQ HOSP IP/OBS HIGH 50: CPT | Performed by: NURSE PRACTITIONER

## 2023-03-03 PROCEDURE — 10002800 HB RX 250 W HCPCS: Performed by: FAMILY MEDICINE

## 2023-03-03 PROCEDURE — 10006031 HB ROOM CHARGE TELEMETRY

## 2023-03-03 PROCEDURE — G0378 HOSPITAL OBSERVATION PER HR: HCPCS

## 2023-03-03 RX ORDER — ESCITALOPRAM OXALATE 10 MG/1
20 TABLET ORAL DAILY
Status: DISCONTINUED | OUTPATIENT
Start: 2023-03-03 | End: 2023-03-07 | Stop reason: HOSPADM

## 2023-03-03 RX ORDER — INSULIN LISPRO 100 [IU]/ML
4 INJECTION, SOLUTION INTRAVENOUS; SUBCUTANEOUS ONCE
Status: COMPLETED | OUTPATIENT
Start: 2023-03-03 | End: 2023-03-03

## 2023-03-03 RX ORDER — INSULIN GLARGINE 100 [IU]/ML
15 INJECTION, SOLUTION SUBCUTANEOUS DAILY
Status: DISCONTINUED | OUTPATIENT
Start: 2023-03-03 | End: 2023-03-07 | Stop reason: HOSPADM

## 2023-03-03 RX ADMIN — INSULIN LISPRO 4 UNITS: 100 INJECTION, SOLUTION INTRAVENOUS; SUBCUTANEOUS at 01:09

## 2023-03-03 RX ADMIN — KETOROLAC TROMETHAMINE 15 MG: 15 INJECTION, SOLUTION INTRAMUSCULAR; INTRAVENOUS at 17:45

## 2023-03-03 RX ADMIN — HYDROMORPHONE HYDROCHLORIDE 0.5 MG: 1 INJECTION, SOLUTION INTRAMUSCULAR; INTRAVENOUS; SUBCUTANEOUS at 14:31

## 2023-03-03 RX ADMIN — ESCITALOPRAM 20 MG: 10 TABLET, FILM COATED ORAL at 11:04

## 2023-03-03 RX ADMIN — HYDROMORPHONE HYDROCHLORIDE 0.4 MG: 1 INJECTION, SOLUTION INTRAMUSCULAR; INTRAVENOUS; SUBCUTANEOUS at 19:40

## 2023-03-03 RX ADMIN — HYDROMORPHONE HYDROCHLORIDE 0.4 MG: 1 INJECTION, SOLUTION INTRAMUSCULAR; INTRAVENOUS; SUBCUTANEOUS at 23:32

## 2023-03-03 RX ADMIN — PIPERACILLIN SODIUM AND TAZOBACTAM SODIUM 3.38 G: 3; .375 INJECTION, POWDER, FOR SOLUTION INTRAVENOUS at 05:16

## 2023-03-03 RX ADMIN — HYDROMORPHONE HYDROCHLORIDE 0.4 MG: 1 INJECTION, SOLUTION INTRAMUSCULAR; INTRAVENOUS; SUBCUTANEOUS at 09:27

## 2023-03-03 RX ADMIN — SODIUM CHLORIDE, PRESERVATIVE FREE 2 ML: 5 INJECTION INTRAVENOUS at 20:19

## 2023-03-03 RX ADMIN — PIPERACILLIN SODIUM AND TAZOBACTAM SODIUM 3.38 G: 3; .375 INJECTION, POWDER, FOR SOLUTION INTRAVENOUS at 13:19

## 2023-03-03 RX ADMIN — INSULIN LISPRO 4 UNITS: 100 INJECTION, SOLUTION INTRAVENOUS; SUBCUTANEOUS at 13:18

## 2023-03-03 RX ADMIN — INSULIN GLARGINE 15 UNITS: 100 INJECTION, SOLUTION SUBCUTANEOUS at 08:03

## 2023-03-03 RX ADMIN — HYDROMORPHONE HYDROCHLORIDE 0.4 MG: 1 INJECTION, SOLUTION INTRAMUSCULAR; INTRAVENOUS; SUBCUTANEOUS at 00:40

## 2023-03-03 RX ADMIN — INSULIN LISPRO 2 UNITS: 100 INJECTION, SOLUTION INTRAVENOUS; SUBCUTANEOUS at 17:46

## 2023-03-03 RX ADMIN — PIPERACILLIN SODIUM AND TAZOBACTAM SODIUM 3.38 G: 3; .375 INJECTION, POWDER, FOR SOLUTION INTRAVENOUS at 21:36

## 2023-03-03 RX ADMIN — HYDROMORPHONE HYDROCHLORIDE 0.5 MG: 1 INJECTION, SOLUTION INTRAMUSCULAR; INTRAVENOUS; SUBCUTANEOUS at 11:04

## 2023-03-03 RX ADMIN — SODIUM CHLORIDE, PRESERVATIVE FREE 2 ML: 5 INJECTION INTRAVENOUS at 08:03

## 2023-03-03 RX ADMIN — HYDROMORPHONE HYDROCHLORIDE 0.4 MG: 1 INJECTION, SOLUTION INTRAMUSCULAR; INTRAVENOUS; SUBCUTANEOUS at 05:17

## 2023-03-03 ASSESSMENT — PAIN SCALES - GENERAL
PAINLEVEL_OUTOF10: 8
PAINLEVEL_OUTOF10: 7
PAINLEVEL_OUTOF10: 8
PAINLEVEL_OUTOF10: 7
PAINLEVEL_OUTOF10: 8

## 2023-03-03 ASSESSMENT — PATIENT HEALTH QUESTIONNAIRE - PHQ9: IS PATIENT ABLE TO COMPLETE PHQ2 OR PHQ9: YES

## 2023-03-03 ASSESSMENT — PAIN SCALES - WONG BAKER: WONGBAKER_NUMERICALRESPONSE: 8

## 2023-03-04 LAB
ANION GAP SERPL CALC-SCNC: 11 MMOL/L (ref 7–19)
BASOPHILS # BLD: 0 K/MCL (ref 0–0.3)
BASOPHILS NFR BLD: 0 %
BUN SERPL-MCNC: 13 MG/DL (ref 6–20)
BUN/CREAT SERPL: 26 (ref 7–25)
CALCIUM SERPL-MCNC: 8.2 MG/DL (ref 8.4–10.2)
CHLORIDE SERPL-SCNC: 106 MMOL/L (ref 97–110)
CO2 SERPL-SCNC: 27 MMOL/L (ref 21–32)
CREAT SERPL-MCNC: 0.5 MG/DL (ref 0.51–0.95)
DEPRECATED RDW RBC: 45.4 FL (ref 39–50)
EOSINOPHIL # BLD: 0.1 K/MCL (ref 0–0.5)
EOSINOPHIL NFR BLD: 2 %
ERYTHROCYTE [DISTWIDTH] IN BLOOD: 13.1 % (ref 11–15)
FASTING DURATION TIME PATIENT: ABNORMAL H
GFR SERPLBLD BASED ON 1.73 SQ M-ARVRAT: >90 ML/MIN
GLUCOSE BLDC GLUCOMTR-MCNC: 147 MG/DL (ref 70–99)
GLUCOSE BLDC GLUCOMTR-MCNC: 174 MG/DL (ref 70–99)
GLUCOSE BLDC GLUCOMTR-MCNC: 314 MG/DL (ref 70–99)
GLUCOSE BLDC GLUCOMTR-MCNC: 355 MG/DL (ref 70–99)
GLUCOSE SERPL-MCNC: 158 MG/DL (ref 70–99)
HCT VFR BLD CALC: 35.1 % (ref 36–46.5)
HGB BLD-MCNC: 11.1 G/DL (ref 12–15.5)
IMM GRANULOCYTES # BLD AUTO: 0 K/MCL (ref 0–0.2)
IMM GRANULOCYTES # BLD: 0 %
LYMPHOCYTES # BLD: 1.8 K/MCL (ref 1–4.8)
LYMPHOCYTES NFR BLD: 38 %
MCH RBC QN AUTO: 30 PG (ref 26–34)
MCHC RBC AUTO-ENTMCNC: 31.6 G/DL (ref 32–36.5)
MCV RBC AUTO: 94.9 FL (ref 78–100)
MONOCYTES # BLD: 0.5 K/MCL (ref 0.3–0.9)
MONOCYTES NFR BLD: 10 %
NEUTROPHILS # BLD: 2.4 K/MCL (ref 1.8–7.7)
NEUTROPHILS NFR BLD: 50 %
NRBC BLD MANUAL-RTO: 0 /100 WBC
PLATELET # BLD AUTO: 352 K/MCL (ref 140–450)
POTASSIUM SERPL-SCNC: 3.8 MMOL/L (ref 3.4–5.1)
RBC # BLD: 3.7 MIL/MCL (ref 4–5.2)
SODIUM SERPL-SCNC: 140 MMOL/L (ref 135–145)
WBC # BLD: 4.7 K/MCL (ref 4.2–11)

## 2023-03-04 PROCEDURE — 10004651 HB RX, NO CHARGE ITEM: Performed by: FAMILY MEDICINE

## 2023-03-04 PROCEDURE — 10002800 HB RX 250 W HCPCS: Performed by: STUDENT IN AN ORGANIZED HEALTH CARE EDUCATION/TRAINING PROGRAM

## 2023-03-04 PROCEDURE — 80048 BASIC METABOLIC PNL TOTAL CA: CPT | Performed by: STUDENT IN AN ORGANIZED HEALTH CARE EDUCATION/TRAINING PROGRAM

## 2023-03-04 PROCEDURE — 85025 COMPLETE CBC W/AUTO DIFF WBC: CPT | Performed by: STUDENT IN AN ORGANIZED HEALTH CARE EDUCATION/TRAINING PROGRAM

## 2023-03-04 PROCEDURE — 10006031 HB ROOM CHARGE TELEMETRY

## 2023-03-04 PROCEDURE — 10002807 HB RX 258: Performed by: INTERNAL MEDICINE

## 2023-03-04 PROCEDURE — 10002803 HB RX 637: Performed by: STUDENT IN AN ORGANIZED HEALTH CARE EDUCATION/TRAINING PROGRAM

## 2023-03-04 PROCEDURE — 10002800 HB RX 250 W HCPCS: Performed by: INTERNAL MEDICINE

## 2023-03-04 PROCEDURE — 10002800 HB RX 250 W HCPCS: Performed by: FAMILY MEDICINE

## 2023-03-04 PROCEDURE — 36415 COLL VENOUS BLD VENIPUNCTURE: CPT | Performed by: STUDENT IN AN ORGANIZED HEALTH CARE EDUCATION/TRAINING PROGRAM

## 2023-03-04 PROCEDURE — 99232 SBSQ HOSP IP/OBS MODERATE 35: CPT | Performed by: STUDENT IN AN ORGANIZED HEALTH CARE EDUCATION/TRAINING PROGRAM

## 2023-03-04 RX ORDER — LORAZEPAM 1 MG/1
1 TABLET ORAL
Status: COMPLETED | OUTPATIENT
Start: 2023-03-04 | End: 2023-03-06

## 2023-03-04 RX ORDER — INSULIN LISPRO 100 [IU]/ML
5 INJECTION, SOLUTION INTRAVENOUS; SUBCUTANEOUS ONCE
Status: COMPLETED | OUTPATIENT
Start: 2023-03-04 | End: 2023-03-04

## 2023-03-04 RX ADMIN — INSULIN LISPRO 5 UNITS: 100 INJECTION, SOLUTION INTRAVENOUS; SUBCUTANEOUS at 12:24

## 2023-03-04 RX ADMIN — HYDROMORPHONE HYDROCHLORIDE 0.4 MG: 1 INJECTION, SOLUTION INTRAMUSCULAR; INTRAVENOUS; SUBCUTANEOUS at 03:39

## 2023-03-04 RX ADMIN — HYDROMORPHONE HYDROCHLORIDE 0.4 MG: 1 INJECTION, SOLUTION INTRAMUSCULAR; INTRAVENOUS; SUBCUTANEOUS at 19:25

## 2023-03-04 RX ADMIN — INSULIN GLARGINE 15 UNITS: 100 INJECTION, SOLUTION SUBCUTANEOUS at 09:25

## 2023-03-04 RX ADMIN — PIPERACILLIN SODIUM AND TAZOBACTAM SODIUM 3.38 G: 3; .375 INJECTION, POWDER, FOR SOLUTION INTRAVENOUS at 21:50

## 2023-03-04 RX ADMIN — PIPERACILLIN SODIUM AND TAZOBACTAM SODIUM 3.38 G: 3; .375 INJECTION, POWDER, FOR SOLUTION INTRAVENOUS at 13:19

## 2023-03-04 RX ADMIN — HYDROMORPHONE HYDROCHLORIDE 0.4 MG: 1 INJECTION, SOLUTION INTRAMUSCULAR; INTRAVENOUS; SUBCUTANEOUS at 15:29

## 2023-03-04 RX ADMIN — PIPERACILLIN SODIUM AND TAZOBACTAM SODIUM 3.38 G: 3; .375 INJECTION, POWDER, FOR SOLUTION INTRAVENOUS at 06:04

## 2023-03-04 RX ADMIN — INSULIN LISPRO 1 UNITS: 100 INJECTION, SOLUTION INTRAVENOUS; SUBCUTANEOUS at 09:26

## 2023-03-04 RX ADMIN — SODIUM CHLORIDE, PRESERVATIVE FREE 2 ML: 5 INJECTION INTRAVENOUS at 09:26

## 2023-03-04 RX ADMIN — HYDROMORPHONE HYDROCHLORIDE 0.4 MG: 1 INJECTION, SOLUTION INTRAMUSCULAR; INTRAVENOUS; SUBCUTANEOUS at 11:32

## 2023-03-04 RX ADMIN — SODIUM CHLORIDE, PRESERVATIVE FREE 2 ML: 5 INJECTION INTRAVENOUS at 21:49

## 2023-03-04 RX ADMIN — HYDROMORPHONE HYDROCHLORIDE 0.4 MG: 1 INJECTION, SOLUTION INTRAMUSCULAR; INTRAVENOUS; SUBCUTANEOUS at 07:23

## 2023-03-04 RX ADMIN — ESCITALOPRAM 20 MG: 10 TABLET, FILM COATED ORAL at 09:23

## 2023-03-04 RX ADMIN — KETOROLAC TROMETHAMINE 15 MG: 15 INJECTION, SOLUTION INTRAMUSCULAR; INTRAVENOUS at 13:15

## 2023-03-04 ASSESSMENT — PAIN SCALES - WONG BAKER: WONGBAKER_NUMERICALRESPONSE: 8

## 2023-03-04 ASSESSMENT — PAIN SCALES - GENERAL
PAINLEVEL_OUTOF10: 3
PAINLEVEL_OUTOF10: 4
PAINLEVEL_OUTOF10: 8
PAINLEVEL_OUTOF10: 3
PAINLEVEL_OUTOF10: 7
PAINLEVEL_OUTOF10: 7
PAINLEVEL_OUTOF10: 3
PAINLEVEL_OUTOF10: 8
PAINLEVEL_OUTOF10: 4
PAINLEVEL_OUTOF10: 0
PAINLEVEL_OUTOF10: 7
PAINLEVEL_OUTOF10: 5

## 2023-03-05 LAB
GLUCOSE BLDC GLUCOMTR-MCNC: 142 MG/DL (ref 70–99)
GLUCOSE BLDC GLUCOMTR-MCNC: 194 MG/DL (ref 70–99)
GLUCOSE BLDC GLUCOMTR-MCNC: 222 MG/DL (ref 70–99)
GLUCOSE BLDC GLUCOMTR-MCNC: 312 MG/DL (ref 70–99)
GLUCOSE BLDC GLUCOMTR-MCNC: 392 MG/DL (ref 70–99)

## 2023-03-05 PROCEDURE — 10002803 HB RX 637: Performed by: STUDENT IN AN ORGANIZED HEALTH CARE EDUCATION/TRAINING PROGRAM

## 2023-03-05 PROCEDURE — 10004651 HB RX, NO CHARGE ITEM: Performed by: FAMILY MEDICINE

## 2023-03-05 PROCEDURE — 10002800 HB RX 250 W HCPCS: Performed by: INTERNAL MEDICINE

## 2023-03-05 PROCEDURE — 10002800 HB RX 250 W HCPCS: Performed by: STUDENT IN AN ORGANIZED HEALTH CARE EDUCATION/TRAINING PROGRAM

## 2023-03-05 PROCEDURE — 10006031 HB ROOM CHARGE TELEMETRY

## 2023-03-05 PROCEDURE — 99232 SBSQ HOSP IP/OBS MODERATE 35: CPT | Performed by: STUDENT IN AN ORGANIZED HEALTH CARE EDUCATION/TRAINING PROGRAM

## 2023-03-05 PROCEDURE — 10002803 HB RX 637: Performed by: FAMILY MEDICINE

## 2023-03-05 PROCEDURE — 10002807 HB RX 258: Performed by: INTERNAL MEDICINE

## 2023-03-05 RX ORDER — INSULIN LISPRO 100 [IU]/ML
2 INJECTION, SOLUTION INTRAVENOUS; SUBCUTANEOUS
Status: DISCONTINUED | OUTPATIENT
Start: 2023-03-05 | End: 2023-03-06

## 2023-03-05 RX ADMIN — PIPERACILLIN SODIUM AND TAZOBACTAM SODIUM 3.38 G: 3; .375 INJECTION, POWDER, FOR SOLUTION INTRAVENOUS at 21:05

## 2023-03-05 RX ADMIN — ESCITALOPRAM 20 MG: 10 TABLET, FILM COATED ORAL at 08:01

## 2023-03-05 RX ADMIN — HYDROMORPHONE HYDROCHLORIDE 0.4 MG: 1 INJECTION, SOLUTION INTRAMUSCULAR; INTRAVENOUS; SUBCUTANEOUS at 00:06

## 2023-03-05 RX ADMIN — INSULIN GLARGINE 15 UNITS: 100 INJECTION, SOLUTION SUBCUTANEOUS at 08:01

## 2023-03-05 RX ADMIN — SODIUM CHLORIDE, PRESERVATIVE FREE 2 ML: 5 INJECTION INTRAVENOUS at 08:01

## 2023-03-05 RX ADMIN — HYDROMORPHONE HYDROCHLORIDE 0.4 MG: 1 INJECTION, SOLUTION INTRAMUSCULAR; INTRAVENOUS; SUBCUTANEOUS at 08:00

## 2023-03-05 RX ADMIN — HYDROMORPHONE HYDROCHLORIDE 0.4 MG: 1 INJECTION, SOLUTION INTRAMUSCULAR; INTRAVENOUS; SUBCUTANEOUS at 16:00

## 2023-03-05 RX ADMIN — INSULIN LISPRO 2 UNITS: 100 INJECTION, SOLUTION INTRAVENOUS; SUBCUTANEOUS at 18:05

## 2023-03-05 RX ADMIN — HYDROMORPHONE HYDROCHLORIDE 0.4 MG: 1 INJECTION, SOLUTION INTRAMUSCULAR; INTRAVENOUS; SUBCUTANEOUS at 20:01

## 2023-03-05 RX ADMIN — INSULIN LISPRO 5 UNITS: 100 INJECTION, SOLUTION INTRAVENOUS; SUBCUTANEOUS at 13:18

## 2023-03-05 RX ADMIN — HYDROMORPHONE HYDROCHLORIDE 0.4 MG: 1 INJECTION, SOLUTION INTRAMUSCULAR; INTRAVENOUS; SUBCUTANEOUS at 03:58

## 2023-03-05 RX ADMIN — SODIUM CHLORIDE, PRESERVATIVE FREE 2 ML: 5 INJECTION INTRAVENOUS at 20:00

## 2023-03-05 RX ADMIN — PIPERACILLIN SODIUM AND TAZOBACTAM SODIUM 3.38 G: 3; .375 INJECTION, POWDER, FOR SOLUTION INTRAVENOUS at 05:48

## 2023-03-05 RX ADMIN — HYDROMORPHONE HYDROCHLORIDE 0.4 MG: 1 INJECTION, SOLUTION INTRAMUSCULAR; INTRAVENOUS; SUBCUTANEOUS at 12:00

## 2023-03-05 RX ADMIN — Medication 3 MG: at 20:00

## 2023-03-05 RX ADMIN — PIPERACILLIN SODIUM AND TAZOBACTAM SODIUM 3.38 G: 3; .375 INJECTION, POWDER, FOR SOLUTION INTRAVENOUS at 13:21

## 2023-03-05 ASSESSMENT — PAIN SCALES - WONG BAKER
WONGBAKER_NUMERICALRESPONSE: 8
WONGBAKER_NUMERICALRESPONSE: 9

## 2023-03-05 ASSESSMENT — PAIN SCALES - GENERAL
PAINLEVEL_OUTOF10: 8
PAINLEVEL_OUTOF10: 7
PAINLEVEL_OUTOF10: 0
PAINLEVEL_OUTOF10: 9
PAINLEVEL_OUTOF10: 8

## 2023-03-06 ENCOUNTER — APPOINTMENT (OUTPATIENT)
Dept: MRI IMAGING | Age: 38
DRG: 394 | End: 2023-03-06
Attending: NURSE PRACTITIONER

## 2023-03-06 LAB
GLUCOSE BLDC GLUCOMTR-MCNC: 134 MG/DL (ref 70–99)
GLUCOSE BLDC GLUCOMTR-MCNC: 273 MG/DL (ref 70–99)
GLUCOSE BLDC GLUCOMTR-MCNC: 342 MG/DL (ref 70–99)
GLUCOSE BLDC GLUCOMTR-MCNC: 392 MG/DL (ref 70–99)
GLUCOSE BLDC GLUCOMTR-MCNC: 61 MG/DL (ref 70–99)

## 2023-03-06 PROCEDURE — 10004651 HB RX, NO CHARGE ITEM: Performed by: FAMILY MEDICINE

## 2023-03-06 PROCEDURE — 10002805 HB CONTRAST AGENT: Performed by: NURSE PRACTITIONER

## 2023-03-06 PROCEDURE — 10006031 HB ROOM CHARGE TELEMETRY

## 2023-03-06 PROCEDURE — 10002800 HB RX 250 W HCPCS: Performed by: STUDENT IN AN ORGANIZED HEALTH CARE EDUCATION/TRAINING PROGRAM

## 2023-03-06 PROCEDURE — G1004 CDSM NDSC: HCPCS

## 2023-03-06 PROCEDURE — 10002800 HB RX 250 W HCPCS: Performed by: INTERNAL MEDICINE

## 2023-03-06 PROCEDURE — A9585 GADOBUTROL INJECTION: HCPCS | Performed by: NURSE PRACTITIONER

## 2023-03-06 PROCEDURE — 72197 MRI PELVIS W/O & W/DYE: CPT

## 2023-03-06 PROCEDURE — 99232 SBSQ HOSP IP/OBS MODERATE 35: CPT | Performed by: STUDENT IN AN ORGANIZED HEALTH CARE EDUCATION/TRAINING PROGRAM

## 2023-03-06 PROCEDURE — 10002803 HB RX 637: Performed by: STUDENT IN AN ORGANIZED HEALTH CARE EDUCATION/TRAINING PROGRAM

## 2023-03-06 PROCEDURE — 10002807 HB RX 258: Performed by: INTERNAL MEDICINE

## 2023-03-06 RX ORDER — INSULIN LISPRO 100 [IU]/ML
5 INJECTION, SOLUTION INTRAVENOUS; SUBCUTANEOUS
Status: DISCONTINUED | OUTPATIENT
Start: 2023-03-06 | End: 2023-03-07 | Stop reason: HOSPADM

## 2023-03-06 RX ORDER — GADOBUTROL 604.72 MG/ML
15 INJECTION INTRAVENOUS ONCE
Status: COMPLETED | OUTPATIENT
Start: 2023-03-06 | End: 2023-03-06

## 2023-03-06 RX ADMIN — HYDROMORPHONE HYDROCHLORIDE 0.4 MG: 1 INJECTION, SOLUTION INTRAMUSCULAR; INTRAVENOUS; SUBCUTANEOUS at 08:46

## 2023-03-06 RX ADMIN — HYDROMORPHONE HYDROCHLORIDE 0.4 MG: 1 INJECTION, SOLUTION INTRAMUSCULAR; INTRAVENOUS; SUBCUTANEOUS at 13:30

## 2023-03-06 RX ADMIN — SODIUM CHLORIDE, PRESERVATIVE FREE 2 ML: 5 INJECTION INTRAVENOUS at 21:40

## 2023-03-06 RX ADMIN — PIPERACILLIN SODIUM AND TAZOBACTAM SODIUM 3.38 G: 3; .375 INJECTION, POWDER, FOR SOLUTION INTRAVENOUS at 13:25

## 2023-03-06 RX ADMIN — HYDROMORPHONE HYDROCHLORIDE 0.4 MG: 1 INJECTION, SOLUTION INTRAMUSCULAR; INTRAVENOUS; SUBCUTANEOUS at 19:33

## 2023-03-06 RX ADMIN — ESCITALOPRAM 20 MG: 10 TABLET, FILM COATED ORAL at 08:45

## 2023-03-06 RX ADMIN — GADOBUTROL 7 ML: 604.72 INJECTION INTRAVENOUS at 21:06

## 2023-03-06 RX ADMIN — SODIUM CHLORIDE, PRESERVATIVE FREE 2 ML: 5 INJECTION INTRAVENOUS at 08:46

## 2023-03-06 RX ADMIN — HYDROMORPHONE HYDROCHLORIDE 0.4 MG: 1 INJECTION, SOLUTION INTRAMUSCULAR; INTRAVENOUS; SUBCUTANEOUS at 17:35

## 2023-03-06 RX ADMIN — INSULIN LISPRO 3 UNITS: 100 INJECTION, SOLUTION INTRAVENOUS; SUBCUTANEOUS at 18:21

## 2023-03-06 RX ADMIN — INSULIN LISPRO 5 UNITS: 100 INJECTION, SOLUTION INTRAVENOUS; SUBCUTANEOUS at 18:21

## 2023-03-06 RX ADMIN — HYDROMORPHONE HYDROCHLORIDE 0.4 MG: 1 INJECTION, SOLUTION INTRAMUSCULAR; INTRAVENOUS; SUBCUTANEOUS at 23:27

## 2023-03-06 RX ADMIN — INSULIN LISPRO 4 UNITS: 100 INJECTION, SOLUTION INTRAVENOUS; SUBCUTANEOUS at 08:45

## 2023-03-06 RX ADMIN — HYDROMORPHONE HYDROCHLORIDE 0.4 MG: 1 INJECTION, SOLUTION INTRAMUSCULAR; INTRAVENOUS; SUBCUTANEOUS at 00:00

## 2023-03-06 RX ADMIN — INSULIN LISPRO 2 UNITS: 100 INJECTION, SOLUTION INTRAVENOUS; SUBCUTANEOUS at 08:44

## 2023-03-06 RX ADMIN — INSULIN LISPRO 5 UNITS: 100 INJECTION, SOLUTION INTRAVENOUS; SUBCUTANEOUS at 13:28

## 2023-03-06 RX ADMIN — PIPERACILLIN SODIUM AND TAZOBACTAM SODIUM 3.38 G: 3; .375 INJECTION, POWDER, FOR SOLUTION INTRAVENOUS at 21:42

## 2023-03-06 RX ADMIN — LORAZEPAM 1 MG: 1 TABLET ORAL at 19:34

## 2023-03-06 RX ADMIN — HYDROMORPHONE HYDROCHLORIDE 0.4 MG: 1 INJECTION, SOLUTION INTRAMUSCULAR; INTRAVENOUS; SUBCUTANEOUS at 04:12

## 2023-03-06 RX ADMIN — PIPERACILLIN SODIUM AND TAZOBACTAM SODIUM 3.38 G: 3; .375 INJECTION, POWDER, FOR SOLUTION INTRAVENOUS at 05:50

## 2023-03-06 RX ADMIN — INSULIN LISPRO 2 UNITS: 100 INJECTION, SOLUTION INTRAVENOUS; SUBCUTANEOUS at 13:27

## 2023-03-06 RX ADMIN — INSULIN GLARGINE 15 UNITS: 100 INJECTION, SOLUTION SUBCUTANEOUS at 08:46

## 2023-03-06 ASSESSMENT — PAIN SCALES - WONG BAKER: WONGBAKER_NUMERICALRESPONSE: 6

## 2023-03-06 ASSESSMENT — PAIN SCALES - GENERAL
PAINLEVEL_OUTOF10: 6
PAINLEVEL_OUTOF10: 5
PAINLEVEL_OUTOF10: 6
PAINLEVEL_OUTOF10: 0

## 2023-03-07 VITALS
BODY MASS INDEX: 26.45 KG/M2 | RESPIRATION RATE: 18 BRPM | WEIGHT: 158.73 LBS | HEIGHT: 65 IN | DIASTOLIC BLOOD PRESSURE: 65 MMHG | HEART RATE: 80 BPM | TEMPERATURE: 97.7 F | OXYGEN SATURATION: 97 % | SYSTOLIC BLOOD PRESSURE: 99 MMHG

## 2023-03-07 LAB
GLUCOSE BLDC GLUCOMTR-MCNC: 188 MG/DL (ref 70–99)
GLUCOSE BLDC GLUCOMTR-MCNC: 240 MG/DL (ref 70–99)
GLUCOSE BLDC GLUCOMTR-MCNC: 395 MG/DL (ref 70–99)

## 2023-03-07 PROCEDURE — 99239 HOSP IP/OBS DSCHRG MGMT >30: CPT | Performed by: STUDENT IN AN ORGANIZED HEALTH CARE EDUCATION/TRAINING PROGRAM

## 2023-03-07 PROCEDURE — 10002803 HB RX 637: Performed by: STUDENT IN AN ORGANIZED HEALTH CARE EDUCATION/TRAINING PROGRAM

## 2023-03-07 PROCEDURE — 10002800 HB RX 250 W HCPCS: Performed by: INTERNAL MEDICINE

## 2023-03-07 PROCEDURE — 10002800 HB RX 250 W HCPCS: Performed by: STUDENT IN AN ORGANIZED HEALTH CARE EDUCATION/TRAINING PROGRAM

## 2023-03-07 PROCEDURE — 10002807 HB RX 258: Performed by: INTERNAL MEDICINE

## 2023-03-07 PROCEDURE — 10004651 HB RX, NO CHARGE ITEM: Performed by: FAMILY MEDICINE

## 2023-03-07 RX ADMIN — INSULIN LISPRO 5 UNITS: 100 INJECTION, SOLUTION INTRAVENOUS; SUBCUTANEOUS at 08:52

## 2023-03-07 RX ADMIN — HYDROMORPHONE HYDROCHLORIDE 0.4 MG: 1 INJECTION, SOLUTION INTRAMUSCULAR; INTRAVENOUS; SUBCUTANEOUS at 12:47

## 2023-03-07 RX ADMIN — SODIUM CHLORIDE, PRESERVATIVE FREE 2 ML: 5 INJECTION INTRAVENOUS at 08:51

## 2023-03-07 RX ADMIN — PIPERACILLIN SODIUM AND TAZOBACTAM SODIUM 3.38 G: 3; .375 INJECTION, POWDER, FOR SOLUTION INTRAVENOUS at 05:56

## 2023-03-07 RX ADMIN — INSULIN LISPRO 5 UNITS: 100 INJECTION, SOLUTION INTRAVENOUS; SUBCUTANEOUS at 09:05

## 2023-03-07 RX ADMIN — INSULIN GLARGINE 15 UNITS: 100 INJECTION, SOLUTION SUBCUTANEOUS at 08:52

## 2023-03-07 RX ADMIN — HYDROMORPHONE HYDROCHLORIDE 0.4 MG: 1 INJECTION, SOLUTION INTRAMUSCULAR; INTRAVENOUS; SUBCUTANEOUS at 14:35

## 2023-03-07 RX ADMIN — ESCITALOPRAM 20 MG: 10 TABLET, FILM COATED ORAL at 08:52

## 2023-03-07 RX ADMIN — HYDROMORPHONE HYDROCHLORIDE 0.4 MG: 1 INJECTION, SOLUTION INTRAMUSCULAR; INTRAVENOUS; SUBCUTANEOUS at 03:39

## 2023-03-07 RX ADMIN — HYDROMORPHONE HYDROCHLORIDE 0.4 MG: 1 INJECTION, SOLUTION INTRAMUSCULAR; INTRAVENOUS; SUBCUTANEOUS at 08:47

## 2023-03-07 ASSESSMENT — PAIN SCALES - GENERAL
PAINLEVEL_OUTOF10: 8
PAINLEVEL_OUTOF10: 0
PAINLEVEL_OUTOF10: 6

## 2024-09-03 ENCOUNTER — HOSPITAL ENCOUNTER (OUTPATIENT)
Age: 39
Setting detail: OBSERVATION
LOS: 1 days | Discharge: HOME | End: 2024-09-04
Attending: EMERGENCY MEDICINE | Admitting: INTERNAL MEDICINE

## 2024-09-03 DIAGNOSIS — R73.9 HYPERGLYCEMIA: Primary | ICD-10-CM

## 2024-09-03 LAB
ALBUMIN SERPL-MCNC: 3.7 G/DL (ref 3.6–5.1)
ALBUMIN/GLOB SERPL: 1 {RATIO} (ref 1–2.4)
ALP SERPL-CCNC: 131 UNITS/L (ref 45–117)
ALT SERPL-CCNC: 25 UNITS/L
ANION GAP SERPL CALC-SCNC: 16 MMOL/L (ref 7–19)
APPEARANCE UR: CLEAR
AST SERPL-CCNC: 19 UNITS/L
B-HCG UR QL: NEGATIVE
B-OH-BUTYR SERPL-SCNC: <0.1 MMOL/L (ref 0–0.3)
BASE EXCESS / DEFICIT, VENOUS - RESPIRATORY: -3 MMOL/L (ref -2–2)
BASE EXCESS / DEFICIT, VENOUS - RESPIRATORY: -3 MMOL/L (ref -2–2)
BASOPHILS # BLD: 0 K/MCL (ref 0–0.3)
BASOPHILS NFR BLD: 0 %
BDY SITE: ABNORMAL
BDY SITE: ABNORMAL
BILIRUB SERPL-MCNC: 0.2 MG/DL (ref 0.2–1)
BILIRUB UR QL STRIP: NEGATIVE
BODY TEMPERATURE: 36.8 DEGREES C
BODY TEMPERATURE: 36.8 DEGREES C
BUN SERPL-MCNC: 17 MG/DL (ref 6–20)
BUN/CREAT SERPL: 30 (ref 7–25)
CA-I BLD-SCNC: 1.31 MMOL/L (ref 1.15–1.29)
CA-I BLD-SCNC: 1.33 MMOL/L (ref 1.15–1.29)
CALCIUM SERPL-MCNC: 9.9 MG/DL (ref 8.4–10.2)
CHLORIDE SERPL-SCNC: 99 MMOL/L (ref 97–110)
CO2 SERPL-SCNC: 19 MMOL/L (ref 21–32)
COHGB MFR BLDV: 3.4 %
COHGB MFR BLDV: 4.3 %
COLOR UR: COLORLESS
CONDITION: ABNORMAL
CONDITION: ABNORMAL
CREAT SERPL-MCNC: 0.56 MG/DL (ref 0.51–0.95)
DEPRECATED RDW RBC: 51.5 FL (ref 39–50)
EGFRCR SERPLBLD CKD-EPI 2021: >90 ML/MIN/{1.73_M2}
EOSINOPHIL # BLD: 0 K/MCL (ref 0–0.5)
EOSINOPHIL NFR BLD: 0 %
ERYTHROCYTE [DISTWIDTH] IN BLOOD: 15.5 % (ref 11–15)
FASTING DURATION TIME PATIENT: ABNORMAL H
GLOBULIN SER-MCNC: 3.6 G/DL (ref 2–4)
GLUCOSE BLDC GLUCOMTR-MCNC: 311 MG/DL (ref 70–99)
GLUCOSE BLDC GLUCOMTR-MCNC: 373 MG/DL (ref 70–99)
GLUCOSE BLDC GLUCOMTR-MCNC: >600 MG/DL (ref 70–99)
GLUCOSE SERPL-MCNC: 815 MG/DL (ref 70–99)
GLUCOSE UR STRIP-MCNC: >1000 MG/DL
HCO3 BLDV-SCNC: 21 MMOL/L (ref 22–28)
HCO3 BLDV-SCNC: 22 MMOL/L (ref 22–28)
HCT VFR BLD CALC: 36.9 % (ref 36–46.5)
HGB BLD-MCNC: 11.9 G/DL (ref 12–15.5)
HGB BLD-MCNC: 12.1 G/DL (ref 12–15.5)
HGB BLD-MCNC: 12.6 G/DL (ref 12–15.5)
HGB UR QL STRIP: NEGATIVE
IMM GRANULOCYTES # BLD AUTO: 0 K/MCL (ref 0–0.2)
IMM GRANULOCYTES # BLD: 0 %
KETONES UR STRIP-MCNC: NEGATIVE MG/DL
LACTATE BLDV-SCNC: 2.5 MMOL/L
LACTATE BLDV-SCNC: 3 MMOL/L (ref 0–2)
LACTATE BLDV-SCNC: 3.1 MMOL/L
LEUKOCYTE ESTERASE UR QL STRIP: NEGATIVE
LIPASE SERPL-CCNC: 45 UNITS/L (ref 15–77)
LYMPHOCYTES # BLD: 0.8 K/MCL (ref 1–4.8)
LYMPHOCYTES NFR BLD: 9 %
MCH RBC QN AUTO: 29.8 PG (ref 26–34)
MCHC RBC AUTO-ENTMCNC: 32.8 G/DL (ref 32–36.5)
MCV RBC AUTO: 90.9 FL (ref 78–100)
METHGB MFR BLDMV: 0.9 %
METHGB MFR BLDMV: 1.4 %
MONOCYTES # BLD: 0.5 K/MCL (ref 0.3–0.9)
MONOCYTES NFR BLD: 6 %
NEUTROPHILS # BLD: 8 K/MCL (ref 1.8–7.7)
NEUTROPHILS NFR BLD: 85 %
NITRITE UR QL STRIP: NEGATIVE
NRBC BLD MANUAL-RTO: 0 /100 WBC
OXYHGB MFR BLDV: 93.9 % (ref 60–80)
OXYHGB MFR BLDV: 95.1 % (ref 60–80)
PCO2 BLDV: 34 MM HG (ref 38–51)
PCO2 BLDV: 34 MM HG (ref 38–51)
PH BLDV: 7.4 UNITS (ref 7.35–7.45)
PH BLDV: 7.41 UNITS (ref 7.35–7.45)
PH UR STRIP: 6.5 [PH] (ref 5–7)
PLATELET # BLD AUTO: 331 K/MCL (ref 140–450)
PO2 BLDV: 86 MM HG (ref 35–42)
PO2 BLDV: 97 MM HG (ref 35–42)
POTASSIUM BLD-SCNC: 4.4 MMOL/L (ref 3.4–5.1)
POTASSIUM BLD-SCNC: 5.2 MMOL/L (ref 3.4–5.1)
POTASSIUM SERPL-SCNC: 4.8 MMOL/L (ref 3.4–5.1)
PROT SERPL-MCNC: 7.3 G/DL (ref 6.4–8.2)
PROT UR STRIP-MCNC: NEGATIVE MG/DL
RBC # BLD: 4.06 MIL/MCL (ref 4–5.2)
SAO2 DF BLDV: 100 % (ref 60–80)
SAO2 DF BLDV: 99 % (ref 60–80)
SODIUM BLD-SCNC: 126 MMOL/L (ref 135–145)
SODIUM BLD-SCNC: 131 MMOL/L (ref 135–145)
SODIUM SERPL-SCNC: 129 MMOL/L (ref 135–145)
SP GR UR STRIP: >1.03 (ref 1–1.03)
UROBILINOGEN UR STRIP-MCNC: 0.2 MG/DL
WBC # BLD: 9.5 K/MCL (ref 4.2–11)

## 2024-09-03 PROCEDURE — 82010 KETONE BODYS QUAN: CPT | Performed by: EMERGENCY MEDICINE

## 2024-09-03 PROCEDURE — 96375 TX/PRO/DX INJ NEW DRUG ADDON: CPT

## 2024-09-03 PROCEDURE — 82962 GLUCOSE BLOOD TEST: CPT

## 2024-09-03 PROCEDURE — 96361 HYDRATE IV INFUSION ADD-ON: CPT

## 2024-09-03 PROCEDURE — 10002807 HB RX 258: Performed by: EMERGENCY MEDICINE

## 2024-09-03 PROCEDURE — 83605 ASSAY OF LACTIC ACID: CPT

## 2024-09-03 PROCEDURE — 80053 COMPREHEN METABOLIC PANEL: CPT | Performed by: NURSE PRACTITIONER

## 2024-09-03 PROCEDURE — 84443 ASSAY THYROID STIM HORMONE: CPT

## 2024-09-03 PROCEDURE — 10006031 HB ROOM CHARGE TELEMETRY

## 2024-09-03 PROCEDURE — 84132 ASSAY OF SERUM POTASSIUM: CPT

## 2024-09-03 PROCEDURE — 83605 ASSAY OF LACTIC ACID: CPT | Performed by: EMERGENCY MEDICINE

## 2024-09-03 PROCEDURE — 84295 ASSAY OF SERUM SODIUM: CPT

## 2024-09-03 PROCEDURE — 81025 URINE PREGNANCY TEST: CPT | Performed by: EMERGENCY MEDICINE

## 2024-09-03 PROCEDURE — 82375 ASSAY CARBOXYHB QUANT: CPT

## 2024-09-03 PROCEDURE — 10002807 HB RX 258

## 2024-09-03 PROCEDURE — 85025 COMPLETE CBC W/AUTO DIFF WBC: CPT | Performed by: NURSE PRACTITIONER

## 2024-09-03 PROCEDURE — 96374 THER/PROPH/DIAG INJ IV PUSH: CPT

## 2024-09-03 PROCEDURE — 96372 THER/PROPH/DIAG INJ SC/IM: CPT

## 2024-09-03 PROCEDURE — 10002800 HB RX 250 W HCPCS

## 2024-09-03 PROCEDURE — 81003 URINALYSIS AUTO W/O SCOPE: CPT | Performed by: NURSE PRACTITIONER

## 2024-09-03 PROCEDURE — 82330 ASSAY OF CALCIUM: CPT | Performed by: EMERGENCY MEDICINE

## 2024-09-03 PROCEDURE — 99291 CRITICAL CARE FIRST HOUR: CPT

## 2024-09-03 PROCEDURE — 82330 ASSAY OF CALCIUM: CPT

## 2024-09-03 PROCEDURE — 10002800 HB RX 250 W HCPCS: Performed by: EMERGENCY MEDICINE

## 2024-09-03 PROCEDURE — 85018 HEMOGLOBIN: CPT

## 2024-09-03 PROCEDURE — 10002801 HB RX 250 W/O HCPCS

## 2024-09-03 PROCEDURE — 83690 ASSAY OF LIPASE: CPT | Performed by: NURSE PRACTITIONER

## 2024-09-03 RX ORDER — INSULIN GLARGINE 100 [IU]/ML
15 INJECTION, SOLUTION SUBCUTANEOUS ONCE
Status: COMPLETED | OUTPATIENT
Start: 2024-09-03 | End: 2024-09-04

## 2024-09-03 RX ORDER — DEXTROSE MONOHYDRATE 25 G/50ML
25 INJECTION, SOLUTION INTRAVENOUS PRN
Status: DISCONTINUED | OUTPATIENT
Start: 2024-09-03 | End: 2024-09-04 | Stop reason: HOSPADM

## 2024-09-03 RX ORDER — DEXTROSE MONOHYDRATE 25 G/50ML
12.5 INJECTION, SOLUTION INTRAVENOUS PRN
Status: DISCONTINUED | OUTPATIENT
Start: 2024-09-03 | End: 2024-09-04 | Stop reason: HOSPADM

## 2024-09-03 RX ORDER — NICOTINE POLACRILEX 4 MG
15 LOZENGE BUCCAL PRN
Status: DISCONTINUED | OUTPATIENT
Start: 2024-09-03 | End: 2024-09-04 | Stop reason: HOSPADM

## 2024-09-03 RX ORDER — INSULIN LISPRO 100 [IU]/ML
6 INJECTION, SOLUTION INTRAVENOUS; SUBCUTANEOUS ONCE
Status: COMPLETED | OUTPATIENT
Start: 2024-09-03 | End: 2024-09-03

## 2024-09-03 RX ORDER — ONDANSETRON 2 MG/ML
4 INJECTION INTRAMUSCULAR; INTRAVENOUS ONCE
Status: COMPLETED | OUTPATIENT
Start: 2024-09-03 | End: 2024-09-03

## 2024-09-03 RX ORDER — NICOTINE POLACRILEX 4 MG
30 LOZENGE BUCCAL PRN
Status: DISCONTINUED | OUTPATIENT
Start: 2024-09-03 | End: 2024-09-04 | Stop reason: HOSPADM

## 2024-09-03 RX ORDER — KETAMINE HCL IN NACL, ISO-OSM 100MG/10ML
0.25 SYRINGE (ML) INJECTION ONCE
Status: COMPLETED | OUTPATIENT
Start: 2024-09-03 | End: 2024-09-03

## 2024-09-03 RX ADMIN — HYDROMORPHONE HYDROCHLORIDE 0.5 MG: 1 INJECTION, SOLUTION INTRAMUSCULAR; INTRAVENOUS; SUBCUTANEOUS at 22:10

## 2024-09-03 RX ADMIN — Medication 18 MG: at 20:12

## 2024-09-03 RX ADMIN — SODIUM CHLORIDE 1000 ML: 9 INJECTION, SOLUTION INTRAVENOUS at 21:06

## 2024-09-03 RX ADMIN — INSULIN LISPRO 6 UNITS: 100 INJECTION, SOLUTION INTRAVENOUS; SUBCUTANEOUS at 23:39

## 2024-09-03 RX ADMIN — ONDANSETRON 4 MG: 2 INJECTION INTRAMUSCULAR; INTRAVENOUS at 20:10

## 2024-09-03 RX ADMIN — SODIUM CHLORIDE 1000 ML: 9 INJECTION, SOLUTION INTRAVENOUS at 19:41

## 2024-09-03 ASSESSMENT — PAIN SCALES - GENERAL: PAINLEVEL_OUTOF10: 8

## 2024-09-04 VITALS
SYSTOLIC BLOOD PRESSURE: 94 MMHG | TEMPERATURE: 99 F | OXYGEN SATURATION: 100 % | DIASTOLIC BLOOD PRESSURE: 62 MMHG | RESPIRATION RATE: 18 BRPM | HEART RATE: 86 BPM

## 2024-09-04 LAB
ALBUMIN SERPL-MCNC: 3.4 G/DL (ref 3.6–5.1)
ALBUMIN/GLOB SERPL: 1 {RATIO} (ref 1–2.4)
ALP SERPL-CCNC: 99 UNITS/L (ref 45–117)
ALT SERPL-CCNC: 25 UNITS/L
ANION GAP SERPL CALC-SCNC: 9 MMOL/L (ref 7–19)
AST SERPL-CCNC: 21 UNITS/L
ATRIAL RATE (BPM): 79
BASOPHILS # BLD: 0 K/MCL (ref 0–0.3)
BASOPHILS NFR BLD: 1 %
BILIRUB SERPL-MCNC: 0.3 MG/DL (ref 0.2–1)
BUN SERPL-MCNC: 12 MG/DL (ref 6–20)
BUN/CREAT SERPL: 27 (ref 7–25)
CA-I ADJ PH7.4 SERPL-SCNC: 1.22 MMOL/L (ref 1.15–1.29)
CA-I SERPL ISE-SCNC: 1.23 MMOL/L (ref 1.15–1.29)
CALCIUM SERPL-MCNC: 9.3 MG/DL (ref 8.4–10.2)
CHLORIDE SERPL-SCNC: 110 MMOL/L (ref 97–110)
CO2 SERPL-SCNC: 25 MMOL/L (ref 21–32)
CORTIS AM PEAK SERPL-MCNC: 13.5 MCG/DL (ref 5.2–22.5)
CREAT SERPL-MCNC: 0.45 MG/DL (ref 0.51–0.95)
DEPRECATED RDW RBC: 50.6 FL (ref 39–50)
EGFRCR SERPLBLD CKD-EPI 2021: >90 ML/MIN/{1.73_M2}
EOSINOPHIL # BLD: 0.1 K/MCL (ref 0–0.5)
EOSINOPHIL NFR BLD: 1 %
ERYTHROCYTE [DISTWIDTH] IN BLOOD: 15.6 % (ref 11–15)
ERYTHROCYTE [SEDIMENTATION RATE] IN BLOOD BY WESTERGREN METHOD: 13 MM/HR (ref 0–20)
FASTING DURATION TIME PATIENT: ABNORMAL H
GLOBULIN SER-MCNC: 3.4 G/DL (ref 2–4)
GLUCOSE BLDC GLUCOMTR-MCNC: 154 MG/DL (ref 70–99)
GLUCOSE BLDC GLUCOMTR-MCNC: 164 MG/DL (ref 70–99)
GLUCOSE BLDC GLUCOMTR-MCNC: 177 MG/DL (ref 70–99)
GLUCOSE BLDC GLUCOMTR-MCNC: 238 MG/DL (ref 70–99)
GLUCOSE BLDC GLUCOMTR-MCNC: 76 MG/DL (ref 70–99)
GLUCOSE SERPL-MCNC: 88 MG/DL (ref 70–99)
HBA1C MFR BLD: 10.5 % (ref 4.5–5.6)
HCG SERPL QL: NEGATIVE
HCT VFR BLD CALC: 35 % (ref 36–46.5)
HGB BLD-MCNC: 11.5 G/DL (ref 12–15.5)
IMM GRANULOCYTES # BLD AUTO: 0 K/MCL (ref 0–0.2)
IMM GRANULOCYTES # BLD: 0 %
LACTATE BLDV-SCNC: 0.8 MMOL/L (ref 0–2)
LYMPHOCYTES # BLD: 1.5 K/MCL (ref 1–4.8)
LYMPHOCYTES NFR BLD: 26 %
MCH RBC QN AUTO: 29.3 PG (ref 26–34)
MCHC RBC AUTO-ENTMCNC: 32.9 G/DL (ref 32–36.5)
MCV RBC AUTO: 89.3 FL (ref 78–100)
MONOCYTES # BLD: 0.6 K/MCL (ref 0.3–0.9)
MONOCYTES NFR BLD: 9 %
NEUTROPHILS # BLD: 3.8 K/MCL (ref 1.8–7.7)
NEUTROPHILS NFR BLD: 63 %
NRBC BLD MANUAL-RTO: 0 /100 WBC
P AXIS (DEGREES): 75
PLATELET # BLD AUTO: 339 K/MCL (ref 140–450)
POTASSIUM SERPL-SCNC: 3.7 MMOL/L (ref 3.4–5.1)
PR-INTERVAL (MSEC): 152
PROT SERPL-MCNC: 6.8 G/DL (ref 6.4–8.2)
QRS-INTERVAL (MSEC): 72
QT-INTERVAL (MSEC): 394
QTC: 452
R AXIS (DEGREES): 52
RAINBOW EXTRA TUBES HOLD SPECIMEN: NORMAL
RBC # BLD: 3.92 MIL/MCL (ref 4–5.2)
REPORT TEXT: NORMAL
SODIUM SERPL-SCNC: 140 MMOL/L (ref 135–145)
T AXIS (DEGREES): 47
TSH SERPL-ACNC: 0.79 MCUNITS/ML (ref 0.35–5)
VENTRICULAR RATE EKG/MIN (BPM): 79
WBC # BLD: 5.9 K/MCL (ref 4.2–11)

## 2024-09-04 PROCEDURE — 85652 RBC SED RATE AUTOMATED: CPT

## 2024-09-04 PROCEDURE — 80053 COMPREHEN METABOLIC PANEL: CPT | Performed by: STUDENT IN AN ORGANIZED HEALTH CARE EDUCATION/TRAINING PROGRAM

## 2024-09-04 PROCEDURE — 85025 COMPLETE CBC W/AUTO DIFF WBC: CPT | Performed by: STUDENT IN AN ORGANIZED HEALTH CARE EDUCATION/TRAINING PROGRAM

## 2024-09-04 PROCEDURE — 82962 GLUCOSE BLOOD TEST: CPT

## 2024-09-04 PROCEDURE — 99236 HOSP IP/OBS SAME DATE HI 85: CPT

## 2024-09-04 PROCEDURE — 93010 ELECTROCARDIOGRAM REPORT: CPT | Performed by: INTERNAL MEDICINE

## 2024-09-04 PROCEDURE — 10002800 HB RX 250 W HCPCS: Performed by: STUDENT IN AN ORGANIZED HEALTH CARE EDUCATION/TRAINING PROGRAM

## 2024-09-04 PROCEDURE — 10002800 HB RX 250 W HCPCS: Performed by: INTERNAL MEDICINE

## 2024-09-04 PROCEDURE — 10002800 HB RX 250 W HCPCS

## 2024-09-04 PROCEDURE — G0378 HOSPITAL OBSERVATION PER HR: HCPCS

## 2024-09-04 PROCEDURE — 83036 HEMOGLOBIN GLYCOSYLATED A1C: CPT

## 2024-09-04 PROCEDURE — 84703 CHORIONIC GONADOTROPIN ASSAY: CPT

## 2024-09-04 PROCEDURE — 10004651 HB RX, NO CHARGE ITEM: Performed by: STUDENT IN AN ORGANIZED HEALTH CARE EDUCATION/TRAINING PROGRAM

## 2024-09-04 PROCEDURE — 82533 TOTAL CORTISOL: CPT

## 2024-09-04 PROCEDURE — 10002803 HB RX 637

## 2024-09-04 PROCEDURE — 96376 TX/PRO/DX INJ SAME DRUG ADON: CPT

## 2024-09-04 PROCEDURE — 83605 ASSAY OF LACTIC ACID: CPT | Performed by: STUDENT IN AN ORGANIZED HEALTH CARE EDUCATION/TRAINING PROGRAM

## 2024-09-04 PROCEDURE — 96372 THER/PROPH/DIAG INJ SC/IM: CPT

## 2024-09-04 PROCEDURE — 96372 THER/PROPH/DIAG INJ SC/IM: CPT | Performed by: INTERNAL MEDICINE

## 2024-09-04 PROCEDURE — 93005 ELECTROCARDIOGRAM TRACING: CPT | Performed by: STUDENT IN AN ORGANIZED HEALTH CARE EDUCATION/TRAINING PROGRAM

## 2024-09-04 RX ORDER — PROCHLORPERAZINE 25 MG/1
SUPPOSITORY RECTAL
COMMUNITY
Start: 2024-08-11

## 2024-09-04 RX ORDER — DICYCLOMINE HYDROCHLORIDE 10 MG/1
20 CAPSULE ORAL
Status: DISCONTINUED | OUTPATIENT
Start: 2024-09-04 | End: 2024-09-04 | Stop reason: HOSPADM

## 2024-09-04 RX ORDER — ALPRAZOLAM 0.5 MG
0.5 TABLET ORAL NIGHTLY PRN
COMMUNITY

## 2024-09-04 RX ORDER — HYDROMORPHONE HYDROCHLORIDE 2 MG/1
4 TABLET ORAL
Status: DISCONTINUED | OUTPATIENT
Start: 2024-09-04 | End: 2024-09-04 | Stop reason: HOSPADM

## 2024-09-04 RX ORDER — POLYETHYLENE GLYCOL 3350 17 G/17G
17 POWDER, FOR SOLUTION ORAL DAILY PRN
Status: DISCONTINUED | OUTPATIENT
Start: 2024-09-04 | End: 2024-09-04 | Stop reason: HOSPADM

## 2024-09-04 RX ORDER — 0.9 % SODIUM CHLORIDE 0.9 %
2 VIAL (ML) INJECTION EVERY 12 HOURS SCHEDULED
Status: DISCONTINUED | OUTPATIENT
Start: 2024-09-04 | End: 2024-09-04 | Stop reason: HOSPADM

## 2024-09-04 RX ORDER — ESCITALOPRAM OXALATE 20 MG/1
20 TABLET ORAL EVERY MORNING
COMMUNITY
End: 2024-09-04

## 2024-09-04 RX ORDER — INSULIN GLARGINE 100 [IU]/ML
18 INJECTION, SOLUTION SUBCUTANEOUS NIGHTLY
Status: DISCONTINUED | OUTPATIENT
Start: 2024-09-04 | End: 2024-09-04

## 2024-09-04 RX ORDER — INSULIN GLARGINE 100 [IU]/ML
12 INJECTION, SOLUTION SUBCUTANEOUS EVERY 12 HOURS SCHEDULED
Status: DISCONTINUED | OUTPATIENT
Start: 2024-09-04 | End: 2024-09-04 | Stop reason: HOSPADM

## 2024-09-04 RX ORDER — ONDANSETRON 2 MG/ML
4 INJECTION INTRAMUSCULAR; INTRAVENOUS EVERY 12 HOURS PRN
Status: DISCONTINUED | OUTPATIENT
Start: 2024-09-04 | End: 2024-09-04 | Stop reason: HOSPADM

## 2024-09-04 RX ORDER — PROCHLORPERAZINE 25 MG/1
SUPPOSITORY RECTAL
COMMUNITY
Start: 2024-05-08

## 2024-09-04 RX ORDER — INSULIN ASPART 100 [IU]/ML
INJECTION, SOLUTION INTRAVENOUS; SUBCUTANEOUS
COMMUNITY
Start: 2024-07-04

## 2024-09-04 RX ORDER — PANTOPRAZOLE SODIUM 40 MG/1
40 TABLET, DELAYED RELEASE ORAL
Status: DISCONTINUED | OUTPATIENT
Start: 2024-09-04 | End: 2024-09-04 | Stop reason: HOSPADM

## 2024-09-04 RX ORDER — INSULIN DEGLUDEC 100 U/ML
INJECTION, SOLUTION SUBCUTANEOUS
COMMUNITY
Start: 2024-05-12

## 2024-09-04 RX ORDER — TRAMADOL HYDROCHLORIDE 100 MG/1
100 TABLET, COATED ORAL EVERY 12 HOURS PRN
COMMUNITY
Start: 2024-08-13

## 2024-09-04 RX ORDER — ONDANSETRON 4 MG/1
4 TABLET, ORALLY DISINTEGRATING ORAL EVERY 6 HOURS PRN
COMMUNITY
Start: 2024-06-08

## 2024-09-04 RX ORDER — INSULIN LISPRO 100 [IU]/ML
6 INJECTION, SOLUTION INTRAVENOUS; SUBCUTANEOUS
Status: DISCONTINUED | OUTPATIENT
Start: 2024-09-04 | End: 2024-09-04 | Stop reason: HOSPADM

## 2024-09-04 RX ORDER — NITROGLYCERIN 4 MG/G
OINTMENT RECTAL
COMMUNITY
Start: 2024-06-08 | End: 2024-09-04

## 2024-09-04 RX ORDER — ONDANSETRON 4 MG/1
4 TABLET, ORALLY DISINTEGRATING ORAL EVERY 12 HOURS PRN
Status: DISCONTINUED | OUTPATIENT
Start: 2024-09-04 | End: 2024-09-04 | Stop reason: HOSPADM

## 2024-09-04 RX ORDER — ESCITALOPRAM OXALATE 20 MG/1
20 TABLET ORAL DAILY
Status: DISCONTINUED | OUTPATIENT
Start: 2024-09-04 | End: 2024-09-04 | Stop reason: HOSPADM

## 2024-09-04 RX ORDER — HYDROMORPHONE HYDROCHLORIDE 2 MG/1
TABLET ORAL
COMMUNITY
Start: 2024-08-01

## 2024-09-04 RX ORDER — KETOROLAC TROMETHAMINE 15 MG/ML
15 INJECTION, SOLUTION INTRAMUSCULAR; INTRAVENOUS EVERY 6 HOURS PRN
Status: DISCONTINUED | OUTPATIENT
Start: 2024-09-04 | End: 2024-09-04

## 2024-09-04 RX ADMIN — DICYCLOMINE HYDROCHLORIDE 20 MG: 10 CAPSULE ORAL at 06:49

## 2024-09-04 RX ADMIN — INSULIN GLARGINE 15 UNITS: 100 INJECTION, SOLUTION SUBCUTANEOUS at 00:48

## 2024-09-04 RX ADMIN — HYDROMORPHONE HYDROCHLORIDE 0.2 MG: 1 INJECTION, SOLUTION INTRAMUSCULAR; INTRAVENOUS; SUBCUTANEOUS at 11:06

## 2024-09-04 RX ADMIN — ESCITALOPRAM OXALATE 20 MG: 20 TABLET, FILM COATED ORAL at 08:50

## 2024-09-04 RX ADMIN — HYDROMORPHONE HYDROCHLORIDE 0.2 MG: 1 INJECTION, SOLUTION INTRAMUSCULAR; INTRAVENOUS; SUBCUTANEOUS at 06:49

## 2024-09-04 RX ADMIN — SODIUM CHLORIDE 2 ML: 9 INJECTION, SOLUTION INTRAMUSCULAR; INTRAVENOUS; SUBCUTANEOUS at 08:51

## 2024-09-04 RX ADMIN — HYDROMORPHONE HYDROCHLORIDE 0.2 MG: 1 INJECTION, SOLUTION INTRAMUSCULAR; INTRAVENOUS; SUBCUTANEOUS at 02:45

## 2024-09-04 RX ADMIN — INSULIN GLARGINE 12 UNITS: 100 INJECTION, SOLUTION SUBCUTANEOUS at 11:11

## 2024-09-04 RX ADMIN — PANTOPRAZOLE SODIUM 40 MG: 40 TABLET, DELAYED RELEASE ORAL at 06:49

## 2024-09-04 SDOH — ECONOMIC STABILITY: TRANSPORTATION INSECURITY
IN THE PAST 12 MONTHS, HAS LACK OF RELIABLE TRANSPORTATION KEPT YOU FROM MEDICAL APPOINTMENTS, MEETINGS, WORK OR FROM GETTING THINGS NEEDED FOR DAILY LIVING?: NO

## 2024-09-04 SDOH — ECONOMIC STABILITY: FOOD INSECURITY: WITHIN THE PAST 12 MONTHS, THE FOOD YOU BOUGHT JUST DIDN'T LAST AND YOU DIDN'T HAVE MONEY TO GET MORE.: NEVER TRUE

## 2024-09-04 SDOH — ECONOMIC STABILITY: INCOME INSECURITY: IN THE PAST 12 MONTHS, HAS THE ELECTRIC, GAS, OIL, OR WATER COMPANY THREATENED TO SHUT OFF SERVICE IN YOUR HOME?: NO

## 2024-09-04 SDOH — ECONOMIC STABILITY: HOUSING INSECURITY: WHAT IS YOUR LIVING SITUATION TODAY?: FAMILY MEMBERS

## 2024-09-04 SDOH — ECONOMIC STABILITY: HOUSING INSECURITY: DO YOU HAVE PROBLEMS WITH ANY OF THE FOLLOWING?: NONE OF THE ABOVE

## 2024-09-04 SDOH — SOCIAL STABILITY: SOCIAL NETWORK: SUPPORT SYSTEMS: FAMILY MEMBERS

## 2024-09-04 SDOH — HEALTH STABILITY: PHYSICAL HEALTH: DO YOU HAVE DIFFICULTY DRESSING OR BATHING?: NO

## 2024-09-04 SDOH — ECONOMIC STABILITY: GENERAL

## 2024-09-04 SDOH — SOCIAL STABILITY: SOCIAL INSECURITY: HOW OFTEN DOES ANYONE, INCLUDING FAMILY AND FRIENDS, PHYSICALLY HURT YOU?: NEVER

## 2024-09-04 SDOH — ECONOMIC STABILITY: GENERAL: WOULD YOU LIKE HELP WITH ANY OF THE FOLLOWING NEEDS?: I DON'T WANT HELP WITH ANY OF THESE

## 2024-09-04 SDOH — HEALTH STABILITY: PHYSICAL HEALTH: DO YOU HAVE SERIOUS DIFFICULTY WALKING OR CLIMBING STAIRS?: NO

## 2024-09-04 SDOH — ECONOMIC STABILITY: HOUSING INSECURITY: WHAT IS YOUR LIVING SITUATION TODAY?: I HAVE A STEADY PLACE TO LIVE

## 2024-09-04 SDOH — ECONOMIC STABILITY: HOUSING INSECURITY: WHAT IS YOUR LIVING SITUATION TODAY?: HOUSE

## 2024-09-04 SDOH — SOCIAL STABILITY: SOCIAL INSECURITY: HOW OFTEN DOES ANYONE, INCLUDING FAMILY AND FRIENDS, INSULT OR TALK DOWN TO YOU?: NEVER

## 2024-09-04 SDOH — SOCIAL STABILITY: SOCIAL INSECURITY: HOW OFTEN DOES ANYONE, INCLUDING FAMILY AND FRIENDS, THREATEN YOU WITH HARM?: NEVER

## 2024-09-04 SDOH — SOCIAL STABILITY: SOCIAL INSECURITY: HOW OFTEN DOES ANYONE, INCLUDING FAMILY AND FRIENDS, SCREAM OR CURSE AT YOU?: NEVER

## 2024-09-04 SDOH — HEALTH STABILITY: GENERAL: BECAUSE OF A PHYSICAL, MENTAL, OR EMOTIONAL CONDITION, DO YOU HAVE DIFFICULTY DOING ERRANDS ALONE?: NO

## 2024-09-04 ASSESSMENT — PATIENT HEALTH QUESTIONNAIRE - PHQ9
2. FEELING DOWN, DEPRESSED OR HOPELESS: NOT AT ALL
SUM OF ALL RESPONSES TO PHQ9 QUESTIONS 1 AND 2: 0
1. LITTLE INTEREST OR PLEASURE IN DOING THINGS: NOT AT ALL
IS PATIENT ABLE TO COMPLETE PHQ2 OR PHQ9: YES
CLINICAL INTERPRETATION OF PHQ2 SCORE: NO FURTHER SCREENING NEEDED
SUM OF ALL RESPONSES TO PHQ9 QUESTIONS 1 AND 2: 0

## 2024-09-04 ASSESSMENT — LIFESTYLE VARIABLES
HOW OFTEN DO YOU HAVE A DRINK CONTAINING ALCOHOL: NEVER
ALCOHOL_USE_STATUS: NO OR LOW RISK WITH VALIDATED TOOL
HOW MANY STANDARD DRINKS CONTAINING ALCOHOL DO YOU HAVE ON A TYPICAL DAY: 0,1 OR 2
HOW OFTEN DO YOU HAVE 6 OR MORE DRINKS ON ONE OCCASION: NEVER
AUDIT-C TOTAL SCORE: 0

## 2024-09-04 ASSESSMENT — ACTIVITIES OF DAILY LIVING (ADL)
ADL_BEFORE_ADMISSION: INDEPENDENT
ADL_SCORE: 12
ADL_SHORT_OF_BREATH: NO
RECENT_DECLINE_ADL: NO

## 2024-09-04 ASSESSMENT — PAIN SCALES - GENERAL
PAINLEVEL_OUTOF10: 7
PAINLEVEL_OUTOF10: 7

## 2024-09-06 ENCOUNTER — TELEPHONE (OUTPATIENT)
Dept: CARE COORDINATION | Age: 39
End: 2024-09-06

## 2024-09-07 ENCOUNTER — TELEPHONE (OUTPATIENT)
Dept: CARE COORDINATION | Age: 39
End: 2024-09-07

## 2024-09-13 ENCOUNTER — TELEPHONE (OUTPATIENT)
Dept: CARE COORDINATION | Age: 39
End: 2024-09-13

## 2024-09-20 ENCOUNTER — TELEPHONE (OUTPATIENT)
Dept: CARE COORDINATION | Age: 39
End: 2024-09-20

## 2024-09-27 ENCOUNTER — TELEPHONE (OUTPATIENT)
Dept: CARE COORDINATION | Age: 39
End: 2024-09-27

## 2024-10-04 ENCOUNTER — TELEPHONE (OUTPATIENT)
Dept: CARE COORDINATION | Age: 39
End: 2024-10-04

## 2025-03-26 ENCOUNTER — APPOINTMENT (OUTPATIENT)
Dept: CT IMAGING | Facility: HOSPITAL | Age: 40
End: 2025-03-26
Attending: EMERGENCY MEDICINE
Payer: COMMERCIAL

## 2025-03-26 ENCOUNTER — HOSPITAL ENCOUNTER (OUTPATIENT)
Facility: HOSPITAL | Age: 40
Setting detail: OBSERVATION
Discharge: LEFT BEFORE DISCHARGE | End: 2025-03-27
Attending: EMERGENCY MEDICINE | Admitting: HOSPITALIST
Payer: COMMERCIAL

## 2025-03-26 DIAGNOSIS — E10.65 TYPE 1 DIABETES MELLITUS WITH HYPERGLYCEMIA (HCC): ICD-10-CM

## 2025-03-26 DIAGNOSIS — K50.90 CROHN'S DISEASE WITHOUT COMPLICATION, UNSPECIFIED GASTROINTESTINAL TRACT LOCATION (HCC): Primary | ICD-10-CM

## 2025-03-26 PROBLEM — R73.9 HYPERGLYCEMIA: Status: ACTIVE | Noted: 2025-03-26

## 2025-03-26 PROBLEM — E87.1 HYPONATREMIA: Status: ACTIVE | Noted: 2025-03-26

## 2025-03-26 PROBLEM — D64.9 ANEMIA: Status: ACTIVE | Noted: 2025-03-26

## 2025-03-26 LAB
ACETONE: NEGATIVE
ALBUMIN SERPL-MCNC: 4.3 G/DL (ref 3.2–4.8)
ALBUMIN/GLOB SERPL: 2.2 {RATIO} (ref 1–2)
ALP LIVER SERPL-CCNC: 101 U/L
ALT SERPL-CCNC: 16 U/L
ANION GAP SERPL CALC-SCNC: 9 MMOL/L (ref 0–18)
AST SERPL-CCNC: 27 U/L (ref ?–34)
B-HCG UR QL: NEGATIVE
B-HCG UR QL: NEGATIVE
BASE EXCESS BLDV CALC-SCNC: -1.2 MMOL/L
BASOPHILS # BLD AUTO: 0.04 X10(3) UL (ref 0–0.2)
BASOPHILS NFR BLD AUTO: 0.6 %
BILIRUB SERPL-MCNC: 0.3 MG/DL (ref 0.3–1.2)
BILIRUB UR QL STRIP.AUTO: NEGATIVE
BUN BLD-MCNC: 7 MG/DL (ref 9–23)
CALCIUM BLD-MCNC: 9.5 MG/DL (ref 8.7–10.6)
CHLORIDE SERPL-SCNC: 99 MMOL/L (ref 98–112)
CLARITY UR REFRACT.AUTO: CLEAR
CO2 SERPL-SCNC: 24 MMOL/L (ref 21–32)
COLOR UR AUTO: COLORLESS
CREAT BLD-MCNC: 0.85 MG/DL
CRP SERPL-MCNC: <0.4 MG/DL (ref ?–0.5)
DEPRECATED HBV CORE AB SER IA-ACNC: 12 NG/ML
EGFRCR SERPLBLD CKD-EPI 2021: 89 ML/MIN/1.73M2 (ref 60–?)
EOSINOPHIL # BLD AUTO: 0.07 X10(3) UL (ref 0–0.7)
EOSINOPHIL NFR BLD AUTO: 1 %
ERYTHROCYTE [DISTWIDTH] IN BLOOD BY AUTOMATED COUNT: 15.9 %
ERYTHROCYTE [SEDIMENTATION RATE] IN BLOOD: 8 MM/HR
EST. AVERAGE GLUCOSE BLD GHB EST-MCNC: 232 MG/DL (ref 68–126)
GLOBULIN PLAS-MCNC: 2 G/DL (ref 2–3.5)
GLUCOSE BLD-MCNC: 264 MG/DL (ref 70–99)
GLUCOSE BLD-MCNC: 285 MG/DL (ref 70–99)
GLUCOSE BLD-MCNC: 321 MG/DL (ref 70–99)
GLUCOSE BLD-MCNC: 422 MG/DL (ref 70–99)
GLUCOSE BLD-MCNC: 439 MG/DL (ref 70–99)
GLUCOSE BLD-MCNC: 562 MG/DL (ref 70–99)
GLUCOSE UR STRIP.AUTO-MCNC: >1000 MG/DL
HBA1C MFR BLD: 9.7 % (ref ?–5.7)
HCG SERPL QL: NEGATIVE
HCO3 BLDV-SCNC: 23.7 MEQ/L (ref 22–26)
HCT VFR BLD AUTO: 30.3 %
HGB BLD-MCNC: 10.5 G/DL
IGA SERPL-MCNC: 89.6 MG/DL (ref 40–350)
IMM GRANULOCYTES # BLD AUTO: 0.02 X10(3) UL (ref 0–1)
IMM GRANULOCYTES NFR BLD: 0.3 %
KETONES UR STRIP.AUTO-MCNC: 80 MG/DL
LEUKOCYTE ESTERASE UR QL STRIP.AUTO: NEGATIVE
LYMPHOCYTES # BLD AUTO: 1.83 X10(3) UL (ref 1–4)
LYMPHOCYTES NFR BLD AUTO: 26.9 %
MCH RBC QN AUTO: 30.8 PG (ref 26–34)
MCHC RBC AUTO-ENTMCNC: 34.7 G/DL (ref 31–37)
MCV RBC AUTO: 88.9 FL
MONOCYTES # BLD AUTO: 0.53 X10(3) UL (ref 0.1–1)
MONOCYTES NFR BLD AUTO: 7.8 %
NEUTROPHILS # BLD AUTO: 4.31 X10 (3) UL (ref 1.5–7.7)
NEUTROPHILS # BLD AUTO: 4.31 X10(3) UL (ref 1.5–7.7)
NEUTROPHILS NFR BLD AUTO: 63.4 %
NITRITE UR QL STRIP.AUTO: NEGATIVE
OSMOLALITY SERPL CALC.SUM OF ELEC: 298 MOSM/KG (ref 275–295)
OXYHGB MFR BLDV: 87.7 % (ref 72–78)
PCO2 BLDV: 39 MM HG (ref 38–50)
PH BLDV: 7.39 [PH] (ref 7.33–7.43)
PH UR STRIP.AUTO: 6.5 [PH] (ref 5–8)
PLATELET # BLD AUTO: 357 10(3)UL (ref 150–450)
PO2 BLDV: 60 MM HG (ref 30–50)
POTASSIUM SERPL-SCNC: 4.4 MMOL/L (ref 3.5–5.1)
PROT SERPL-MCNC: 6.3 G/DL (ref 5.7–8.2)
PROT UR STRIP.AUTO-MCNC: NEGATIVE MG/DL
RBC # BLD AUTO: 3.41 X10(6)UL
RBC UR QL AUTO: NEGATIVE
SODIUM SERPL-SCNC: 132 MMOL/L (ref 136–145)
SP GR UR STRIP.AUTO: >1.03 (ref 1–1.03)
UROBILINOGEN UR STRIP.AUTO-MCNC: NORMAL MG/DL
WBC # BLD AUTO: 6.8 X10(3) UL (ref 4–11)

## 2025-03-26 PROCEDURE — 99223 1ST HOSP IP/OBS HIGH 75: CPT | Performed by: HOSPITALIST

## 2025-03-26 PROCEDURE — 74177 CT ABD & PELVIS W/CONTRAST: CPT | Performed by: EMERGENCY MEDICINE

## 2025-03-26 RX ORDER — ONDANSETRON 2 MG/ML
4 INJECTION INTRAMUSCULAR; INTRAVENOUS EVERY 6 HOURS PRN
Status: DISCONTINUED | OUTPATIENT
Start: 2025-03-26 | End: 2025-03-27

## 2025-03-26 RX ORDER — HYDROMORPHONE HYDROCHLORIDE 2 MG/1
2 TABLET ORAL
Status: DISCONTINUED | OUTPATIENT
Start: 2025-03-26 | End: 2025-03-27

## 2025-03-26 RX ORDER — HYDROMORPHONE HYDROCHLORIDE 1 MG/ML
1 INJECTION, SOLUTION INTRAMUSCULAR; INTRAVENOUS; SUBCUTANEOUS EVERY 4 HOURS PRN
Status: DISCONTINUED | OUTPATIENT
Start: 2025-03-26 | End: 2025-03-27

## 2025-03-26 RX ORDER — NICOTINE POLACRILEX 4 MG
15 LOZENGE BUCCAL
Status: DISCONTINUED | OUTPATIENT
Start: 2025-03-26 | End: 2025-03-27

## 2025-03-26 RX ORDER — HYDROMORPHONE HYDROCHLORIDE 1 MG/ML
0.5 INJECTION, SOLUTION INTRAMUSCULAR; INTRAVENOUS; SUBCUTANEOUS EVERY 4 HOURS PRN
Status: DISCONTINUED | OUTPATIENT
Start: 2025-03-26 | End: 2025-03-27

## 2025-03-26 RX ORDER — ONDANSETRON 2 MG/ML
4 INJECTION INTRAMUSCULAR; INTRAVENOUS ONCE
Status: COMPLETED | OUTPATIENT
Start: 2025-03-26 | End: 2025-03-26

## 2025-03-26 RX ORDER — TRAMADOL HYDROCHLORIDE 50 MG/1
50 TABLET ORAL EVERY 8 HOURS PRN
COMMUNITY

## 2025-03-26 RX ORDER — DEXTROSE MONOHYDRATE 25 G/50ML
50 INJECTION, SOLUTION INTRAVENOUS
Status: DISCONTINUED | OUTPATIENT
Start: 2025-03-26 | End: 2025-03-27

## 2025-03-26 RX ORDER — INSULIN LISPRO 100 [IU]/ML
INJECTION, SOLUTION INTRAVENOUS; SUBCUTANEOUS
COMMUNITY
Start: 2024-12-15

## 2025-03-26 RX ORDER — HYDROMORPHONE HYDROCHLORIDE 1 MG/ML
0.2 INJECTION, SOLUTION INTRAMUSCULAR; INTRAVENOUS; SUBCUTANEOUS EVERY 2 HOUR PRN
Status: DISCONTINUED | OUTPATIENT
Start: 2025-03-26 | End: 2025-03-26

## 2025-03-26 RX ORDER — ECHINACEA PURPUREA EXTRACT 125 MG
1 TABLET ORAL
Status: DISCONTINUED | OUTPATIENT
Start: 2025-03-26 | End: 2025-03-27

## 2025-03-26 RX ORDER — MORPHINE SULFATE 4 MG/ML
4 INJECTION, SOLUTION INTRAMUSCULAR; INTRAVENOUS EVERY 30 MIN PRN
Status: DISCONTINUED | OUTPATIENT
Start: 2025-03-26 | End: 2025-03-26

## 2025-03-26 RX ORDER — ESCITALOPRAM OXALATE 10 MG/1
20 TABLET ORAL DAILY
Status: DISCONTINUED | OUTPATIENT
Start: 2025-03-26 | End: 2025-03-27

## 2025-03-26 RX ORDER — DEXTROAMPHETAMINE SACCHARATE, AMPHETAMINE ASPARTATE MONOHYDRATE, DEXTROAMPHETAMINE SULFATE AND AMPHETAMINE SULFATE 5; 5; 5; 5 MG/1; MG/1; MG/1; MG/1
20 CAPSULE, EXTENDED RELEASE ORAL EVERY MORNING
COMMUNITY
Start: 2025-03-10

## 2025-03-26 RX ORDER — HYDROMORPHONE HYDROCHLORIDE 1 MG/ML
0.4 INJECTION, SOLUTION INTRAMUSCULAR; INTRAVENOUS; SUBCUTANEOUS EVERY 2 HOUR PRN
Status: DISCONTINUED | OUTPATIENT
Start: 2025-03-26 | End: 2025-03-26

## 2025-03-26 RX ORDER — HYDROMORPHONE HYDROCHLORIDE 4 MG/1
4 TABLET ORAL EVERY 4 HOURS PRN
COMMUNITY
End: 2025-03-26 | Stop reason: CLARIF

## 2025-03-26 RX ORDER — ACETAMINOPHEN 500 MG
1000 TABLET ORAL EVERY 4 HOURS PRN
Status: DISCONTINUED | OUTPATIENT
Start: 2025-03-26 | End: 2025-03-27

## 2025-03-26 RX ORDER — HYDROMORPHONE HYDROCHLORIDE 1 MG/ML
1 INJECTION, SOLUTION INTRAMUSCULAR; INTRAVENOUS; SUBCUTANEOUS ONCE
Status: COMPLETED | OUTPATIENT
Start: 2025-03-26 | End: 2025-03-26

## 2025-03-26 RX ORDER — INSULIN DEGLUDEC 100 U/ML
25 INJECTION, SOLUTION SUBCUTANEOUS DAILY
Status: DISCONTINUED | OUTPATIENT
Start: 2025-03-26 | End: 2025-03-27

## 2025-03-26 RX ORDER — NICOTINE POLACRILEX 4 MG
30 LOZENGE BUCCAL
Status: DISCONTINUED | OUTPATIENT
Start: 2025-03-26 | End: 2025-03-27

## 2025-03-26 RX ORDER — ESCITALOPRAM OXALATE 20 MG/1
20 TABLET ORAL DAILY
COMMUNITY

## 2025-03-26 RX ORDER — ENOXAPARIN SODIUM 100 MG/ML
40 INJECTION SUBCUTANEOUS DAILY
Status: DISCONTINUED | OUTPATIENT
Start: 2025-03-26 | End: 2025-03-27

## 2025-03-26 RX ORDER — HYDROMORPHONE HYDROCHLORIDE 2 MG/1
2 TABLET ORAL EVERY 4 HOURS PRN
COMMUNITY
Start: 2022-09-29

## 2025-03-26 RX ORDER — INSULIN DEGLUDEC 100 U/ML
40 INJECTION, SOLUTION SUBCUTANEOUS DAILY
COMMUNITY
Start: 2025-03-17 | End: 2026-03-17

## 2025-03-26 RX ORDER — PROCHLORPERAZINE EDISYLATE 5 MG/ML
5 INJECTION INTRAMUSCULAR; INTRAVENOUS EVERY 8 HOURS PRN
Status: DISCONTINUED | OUTPATIENT
Start: 2025-03-26 | End: 2025-03-27

## 2025-03-26 RX ORDER — SODIUM CHLORIDE, SODIUM LACTATE, POTASSIUM CHLORIDE, CALCIUM CHLORIDE 600; 310; 30; 20 MG/100ML; MG/100ML; MG/100ML; MG/100ML
INJECTION, SOLUTION INTRAVENOUS CONTINUOUS
Status: DISCONTINUED | OUTPATIENT
Start: 2025-03-26 | End: 2025-03-27

## 2025-03-26 RX ORDER — HYDROMORPHONE HYDROCHLORIDE 1 MG/ML
1 INJECTION, SOLUTION INTRAMUSCULAR; INTRAVENOUS; SUBCUTANEOUS EVERY 30 MIN PRN
Status: DISCONTINUED | OUTPATIENT
Start: 2025-03-26 | End: 2025-03-26

## 2025-03-26 RX ORDER — DEXTROAMPHETAMINE SACCHARATE, AMPHETAMINE ASPARTATE, DEXTROAMPHETAMINE SULFATE AND AMPHETAMINE SULFATE 2.5; 2.5; 2.5; 2.5 MG/1; MG/1; MG/1; MG/1
10 TABLET ORAL DAILY
COMMUNITY
Start: 2025-03-10

## 2025-03-26 RX ORDER — PROMETHAZINE HYDROCHLORIDE 25 MG/1
25 SUPPOSITORY RECTAL EVERY 6 HOURS PRN
COMMUNITY
Start: 2025-03-23

## 2025-03-26 RX ORDER — INSULIN ASPART 100 [IU]/ML
0.2 INJECTION, SOLUTION INTRAVENOUS; SUBCUTANEOUS ONCE
Status: COMPLETED | OUTPATIENT
Start: 2025-03-26 | End: 2025-03-26

## 2025-03-26 NOTE — H&P
[facility]     Esperanza Mauro Patient Status:  Emergency    1985 MRN [id number]   Location [facility] EMERGENCY DEPARTMENT Attending Brandt Garcia MD   Hosp Day # 0 PCP None Pcp     Chief Complaint:   Chief Complaint   Patient presents with    Hyperglycemia       Subjective:    History of Present Illness:     Esperanza Mauro is a 40 year old female with a past medical history of DM1 and crohns disease.  She has had LLQ abd pain with subjective fevers/chills with bloody streaked stools for the past 3 days.  Glucose has been elevated.  In the ED she has not had any BM's.  CT a/p in the ED is neg for acute process.     History/Other:    Past Medical History:  Past Medical History:    Crohn's disease (HCC)    Type 1 diabetes mellitus (HCC)     Past Surgical History:   Past Surgical History:   Procedure Laterality Date    I&d deep absc neck/chst+rib cut        Family History:   No family history on file.  Social History:    reports that she has never smoked. She has never used smokeless tobacco. She reports that she does not currently use alcohol. She reports that she does not use drugs.     Allergies: Allergies[1]    Medications:  Medications Ordered Prior to Encounter[2]    Review of Systems:   A comprehensive review of systems was completed.    Pertinent positives and negatives noted in the HPI.    Objective:   Physical Exam:    /66   Pulse 77   Temp 98 °F (36.7 °C) (Temporal)   Resp 10   Ht 5' 5\" (1.651 m)   Wt 150 lb (68 kg)   LMP 10/01/2024   SpO2 98%   BMI 24.96 kg/m²   General: No acute distress, Alert  Respiratory: No rhonchi, no wheezes  Cardiovascular: S1, S2.   Abdomen: Soft, Non-tender, Non-distended, Positive bowel sounds  Neuro: No new focal deficits  Extremities: No edema      Results:    Labs:      Labs Last 24 Hours:  Recent Labs   Lab 25  0330   WBC 6.8   HGB 10.5*   MCV 88.9   .0       Recent Labs   Lab 25  0330   GLU  562*   BUN 7*   CREATSERUM 0.85   CA 9.5   ALB 4.3   *   K 4.4   CL 99   CO2 24.0   ALKPHO 101*   AST 27   ALT 16   BILT 0.3   TP 6.3       Estimated Glomerular Filtration Rate: 89 mL/min/1.73m2 (result from lab).    No results for input(s): \"TROP\", \"TROPHS\", \"CK\" in the last 168 hours.    No results for input(s): \"PTP\", \"INR\" in the last 168 hours.    No results for input(s): \"TROP\", \"CK\" in the last 168 hours.      Imaging: Imaging data reviewed in Epic.    Assessment & Plan:      #CD with possible flare  CT unremarkable for acute process  ESR/CRP  R/o infection ( stool PCR, c. Diff)  Hold steroids for now  Cont. Home pain regimen  On remicade as OP    #Hidradenitis suppurativa  On remicade     #Chronic pain  Pt endorses PO dilaudid 4mg PRN, [place] 2[place]  F/u with primary prescribing pysician    #DM1 with hyperglycemia  Insulin protocol  Per pt she is on Tresiba twice a day?    Insulin protocol      Dispo: as above.  Pt states she sees Dr. Trevino at [facility] for CD x 10 years.   Not able to find any notes.  Had flex sig 9/18/24 neg for acute inflammation.  Review of previous charting does not reveal any +ve work up for CD.  Evaluations at OSH consistent with ? Functional component.  Has had many hospitalizations, AMA when not getting IV pain meds.  States she has allergy to toradol and morphine but takes PO dilaudid.  States she typically gets 1.5-2mg IV dilaudid when her CD is flared.  Discussed with pt, CT is unremarkable.  Will need to r/o infection.      All diagnosis' and recommendations discussed with patient and/or family in detail.      Plan of care discussed with ED physician      Ja Galvin MD    Supplementary Documentation:     The 21st Century Cures Act makes medical notes like these available to patients in the interest of transparency. Please be advised this is a medical document. Medical documents are intended to carry relevant information, facts as evident, and the clinical opinion of the  practitioner. The medical note is intended as peer to peer communication and may appear blunt or direct. It is written in medical language and may contain abbreviations or verbiage that are unfamiliar.                                         [1]   Allergies  Allergen Reactions    Vancomycin ANAPHYLAXIS    Morphine RASH    Toradol [Ketorolac Tromethamine] RASH   [2]   No current facility-administered medications on file prior to encounter.     Current Outpatient Medications on File Prior to Encounter   Medication Sig Dispense Refill    escitalopram 20 MG Oral Tab Take 1 tablet (20 mg total) by mouth daily.      inFLIXimab (REMICADE IV) Inject into the vein.      HYDROmorphone 4 MG Oral Tab Take 1 tablet (4 mg total) by mouth every 4 (four) hours as needed for Pain.

## 2025-03-26 NOTE — ED PROVIDER NOTES
Patient Seen in: [facility] Emergency Department      History     Chief Complaint   Patient presents with    Hyperglycemia     Stated Complaint: pt states hyperglycemic and hx of chrones    Subjective:   HPI      Patient is a 40-year-old female presents to ED for evaluation of abdominal pain, bloody stool, elevated blood sugar.  History of diabetes, Crohn's.  Patient on Remicade for Crohn's every 12 weeks last received infusion 2 weeks ago.  Patient complains of Crohn's exacerbation for 2 days.  Complains of constant left lower quadrant pain.  She complains of 4 episodes of vomiting today.  She complains of increased frequency of bowel movements 4-5 episodes bloody in nature.  Normally has 2 bowel movements a day nonbloody when she is not in an exacerbation.  Patient denies any abdominal surgery.  Last menstrual period in 2018.  She has an IUD.  She denies any fever.  No abdominal surgical history.  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  Patient states if she needs to be put on steroids for Crohn's, she needs to be admitted because it puts her into diabetic ketoacidosis    Objective:     Past Medical History:    Crohn's disease (HCC)    Type 1 diabetes mellitus (HCC)              Past Surgical History:   Procedure Laterality Date    I&d deep absc neck/chst+rib cut                  Social History     Socioeconomic History    Marital status:    Tobacco Use    Smoking status: Never    Smokeless tobacco: Never   Vaping Use    Vaping status: Never Used   Substance and Sexual Activity    Alcohol use: Not Currently    Drug use: Never     Social Drivers of Health     Food Insecurity: No Food Insecurity (9/16/2024)    Received from [employer]    Hunger Vital Sign     Worried About Running Out of Food in the Last Year: Never true     Ran Out of Food in the Last Year: Never true   Transportation Needs: No Transportation Needs (9/16/2024)    [facility] from [employer]    [facility]     Lack  of Transportation (Medical): No     Lack of Transportation (Non-Medical): No   Housing Stability: Low Risk  (9/16/2024)    Received from [employer]    Housing Stability Vital Sign     Unable to Pay for Housing in the Last Year: No     Number of Times Moved in the Last Year: 0     Homeless in the Last Year: No                  Physical Exam     ED Triage Vitals   BP 03/26/25 0416 112/66   Pulse 03/26/25 0350 71   Resp 03/26/25 0350 14   Temp 03/26/25 0416 98 °F (36.7 °C)   Temp src 03/26/25 0416 Temporal   SpO2 03/26/25 0350 98 %   O2 Device 03/26/25 0350 None (Room air)       Current Vitals:   Vital Signs  BP: 112/66  Pulse: 77  Resp: 10  Temp: 98 °F (36.7 °C)  Temp src: Temporal  MAP (mmHg): 80    Oxygen Therapy  SpO2: 98 %  O2 Device: None (Room air)        Physical Exam  GENERAL: No acute distress, well appearing and non-toxic, Alert and oriented X 3   HEENT: Normocephalic, atraumatic.  Moist mucous membranes.  Pupils equal round reactive to light and accommodation, extraocular motion is intact, sclerae white, conjunctiva is pink.  Oropharynx is unremarkable, no exudate.  NECK: Supple, trachea midline, no lymphadenopathy.   LUNG: Lungs clear to auscultation bilaterally, no wheezing, no rales, no rhonchi.  CARDIOVASCULAR: Regular rate and rhythm.  Normal S1S2.  No S3S4 or murmur.  ABDOMEN: Bowel sounds are present. Soft. nondistended, no pulsatile masses.  Mild left lower quadrant tenderness without rebound, guarding or rigidity  MUSCULOSKELETAL: No calf tenderness.  Dorsalis and Posterior Tibial pulses present. No clubbing. No cyanosis.  No edema.   SKIN EXAMINATIoN: Warm and dry with normal appearance.  No rashes or lesions.  NEUROLOGICAL:  Motor strength intact all groups.  normal sensation, speech intact    ED Course     Labs Reviewed   CBC WITH DIFFERENTIAL WITH PLATELET - Abnormal; Notable for the following components:       Result Value    RBC 3.41 (*)     HGB 10.5 (*)     HCT 30.3 (*)     All other  components within normal limits   COMP METABOLIC PANEL (14) - Abnormal; Notable for the following components:    Glucose 562 (*)     Sodium 132 (*)     BUN 7 (*)     Calculated Osmolality 298 (*)     Alkaline Phosphatase 101 (*)     A/G Ratio 2.2 (*)     All other components within normal limits   VENOUS BLOOD GAS - Abnormal; Notable for the following components:    Venous pO2 60 (*)     Venous O2Hb 87.7 (*)     All other components within normal limits   URINALYSIS, ROUTINE - Abnormal; Notable for the following components:    Urine Color Colorless (*)     Spec Gravity >1.030 (*)     Glucose Urine >1000 (*)     Ketones Urine 80 (*)     All other components within normal limits   HEMOGLOBIN A1C - Abnormal; Notable for the following components:    HgbA1C 9.7 (*)     Estimated Average Glucose 232 (*)     All other components within normal limits   POCT GLUCOSE - Abnormal; Notable for the following components:    POC Glucose 439 (*)     All other components within normal limits   POCT GLUCOSE - Abnormal; Notable for the following components:    POC Glucose 422 (*)     All other components within normal limits   ACETONE - Normal   HCG, BETA SUBUNIT, QUAL - Normal   POCT PREGNANCY URINE - Normal   POCT PREGNANCY URINE - Normal   C. DIFFICILE(TOXIGENIC)PCR   OCCULT BLOOD, STOOL   STOOL CULTURE W/SHIGATOXIN            I personally reviewd CT images of abdomen and pelvis and independent interpretation shows no sign of bowel obstruction.  I also viewed formal radiology report as read by radiology with findings below:    CT of abdomen and pelvis read by vision rad radiology shows no acute findings.  IUD noted.    Medications   morphINE PF 4 MG/ML injection 4 mg (has no administration in time range)   HYDROmorphone (Dilaudid) 1 MG/ML injection 1 mg (1 mg Intravenous Given 3/26/25 0601)   sodium chloride 0.9 % IV bolus 1,000 mL (0 mL Intravenous Stopped 3/26/25 0524)   insulin aspart (NovoLOG) 100 Units/mL vial 14 Units (14  Units Subcutaneous Given 3/26/25 0335)   ondansetron (Zofran) 4 MG/2ML injection 4 mg (4 mg Intravenous Given 3/26/25 5708)   HYDROmorphone (Dilaudid) 1 MG/ML injection 1 mg (1 mg Intravenous Given 3/26/25 2837)   iopamidol 76% (ISOVUE-370) injection for power injector (80 mL Intravenous Given 3/26/25 4557)          MDM      Patient is a 40-year-old female presents to ED for evaluation of abdominal pain.  History of Crohn's.  Differential Crohn's flare, diverticulitis, ketoacidosis, hyperglycemia.  Laboratory test obtained showing hyperglycemia.  No evidence for ketoacidosis.  Negative urinalysis except for ketones.  Hemoglobin 10.5.  Sodium 132.  CT abdomen pelvis showed no acute process.  Patient given IV pain medication.  Stool studies were ordered.  Patient feeling like she needs to come in the hospital for pain control, potential steroids.  Spoke with hospitalist, Dr. Galvin who recommended holding off on IV steroids at this time and will admit to the hospital.  Patient given insulin for hyperglycemia.  Workup and results were discussed with patient. Patient has no other questions, complaints or concerns. Patient will be admitted to the hospital for further workup.    Admission disposition: 3/26/2025  5:27 AM           Medical Decision Making      Disposition and Plan     Clinical Impression:  1. Crohn's disease without complication, unspecified gastrointestinal tract location (HCC)    2. Type 1 diabetes mellitus with hyperglycemia (HCC)         Disposition:  Admit  3/26/2025  5:27 am    Follow-up:  No follow-up provider specified.        Medications Prescribed:  Current Discharge Medication List              Supplementary Documentation:         Hospital Problems       Present on Admission             ICD-10-CM Noted POA    * (Principal) Crohn's disease without complication, unspecified gastrointestinal tract location (HCC) K50.90 3/26/2025 Unknown    Anemia D64.9 3/26/2025 Yes    Hyperglycemia R73.9 3/26/2025  Yes    Hyponatremia E87.1 3/26/2025 Yes

## 2025-03-26 NOTE — ED INITIAL ASSESSMENT (HPI)
Patient presents to ED with chief complaint of abdominal pain, rectal bleeding, nausea and vomiting. Pt states symptoms started 3 days prior to consult. States that she has hx of Chron's Dis and every time she gets an attack her sugar goes high. States that her sugar check was high at home. Pain is located on her left lower abdomen

## 2025-03-26 NOTE — ED QUICK NOTES
Orders for admission, patient is aware of plan and ready to go upstairs. Any questions, please call ED RN Lilian at extension [phone].     Patient Covid vaccination status: Unvaccinated     COVID Test Ordered in ED: None    COVID Suspicion at Admission: N/A    Running Infusions:  None    Mental Status/LOC at time of transport: AOX4    Other pertinent information:  -PRN pain meds     CIWA score: N/A   NIH score:  N/A

## 2025-03-26 NOTE — CONSULTS
[facility]                       [facility]    Esperanza Mauro Patient Status:  Observation    1985 MRN [id number]   Location [facility] 0SW-A Attending Ja Galvin MD   Hosp Day # 0 PCP None Pcp         Reason for consultation: rectal pain, LLQ pain  HPI: Ms. Mauro is a pleasant 41 yo F with Crohn's disease of her colon (dx 8 years ago) on Remicade q 12 weeks for 1.5 years, who presents with LLQ pain and rectal pain. She has hiwot-anal disease/fistulas, requiring [facility] (last 1.5 years ago). She denies any symptoms of drainage around the anus. She was on Humira prior to Remicade which stopped working. She is having 6 BMs per day with some bright red blood mixed in the stool. She reports symptoms started 3 days ago. Her flares usually last for 4 days. She denies recent NSAIDs, unusual ingestion, smoking. She normally has 1-2 formed BMs/day. She denies fevers. She has gone into DKA with IV steroids. Her last Remicade was 2 weeks ago. She follows with Dr. Shi. She is going to [place] next week for a second opinion. She has done suppositories and Abx in the past. She was last admitted in 2024. Hao denies new medications.  Last colonoscopy as below:  2024 [facility] Colonoscopy:  Findings   The terminal ileum, ileocecal valve and entire colon appeared normal.   Performed random biopsy using biopsy forceps.   The rectum appeared normal. Performed random biopsy using biopsy forceps.   Internal small (grade 1) hemorrhoids; no bleeding was identified. Perianal   examination shows area of scarring from prior interventions, no evidence   of fistulization appreciated      A.  Colon  Multiple fragments of polypoidal colonic mucosa with superficial hyperplastic changes with few lymphoid aggregates in the lamina propria  Deeper levels examined     B.  Rectum  Multiple fragments of polypoidal colonic mucosa with superficial hyperplastic changes    Deeper levels examined     PMHx:   Past Medical History:    Crohn's disease (HCC)    Type 1 diabetes mellitus (HCC)     PSHx:   Past Surgical History:   Procedure Laterality Date    I&d deep absc neck/chst+rib cut       Medications:    [COMPLETED] sodium chloride 0.9 % IV bolus 1,000 mL  1,000 mL Intravenous Once    [COMPLETED] insulin aspart (NovoLOG) 100 Units/mL vial 14 Units  0.2 Units/kg Subcutaneous Once    [COMPLETED] ondansetron (Zofran) 4 MG/2ML injection 4 mg  4 mg Intravenous Once    [COMPLETED] HYDROmorphone (Dilaudid) 1 MG/ML injection 1 mg  1 mg Intravenous Once    [COMPLETED] iopamidol 76% (ISOVUE-370) injection for power injector  80 mL Intravenous ONCE PRN    escitalopram (Lexapro) tab 20 mg  20 mg Oral Daily    HYDROmorphone (Dilaudid) tab 2 mg  2 mg Oral Q3H PRN    acetaminophen (Tylenol Extra Strength) tab 1,000 mg  1,000 mg Oral Q4H PRN    melatonin tab 3 mg  3 mg Oral Nightly PRN    glycerin-hypromellose- (Artificial Tears) 0.2-0.2-1 % ophthalmic solution 1 drop  1 drop Both Eyes QID PRN    sodium chloride (Saline Mist) 0.65 % nasal solution 1 spray  1 spray Each Nare Q3H PRN    enoxaparin (Lovenox) 40 MG/0.4ML SUBQ injection 40 mg  40 mg Subcutaneous Daily    ondansetron (Zofran) 4 MG/2ML injection 4 mg  4 mg Intravenous Q6H PRN    prochlorperazine (Compazine) 10 MG/2ML injection 5 mg  5 mg Intravenous Q8H PRN    insulin aspart (NovoLOG) 100 Units/mL FlexPen 1-68 Units  1-68 Units Subcutaneous TID CC    glucose (Dex4) 15 GM/59ML oral liquid 15 g  15 g Oral Q15 Min PRN    Or    glucose (Glutose) 40% oral gel 15 g  15 g Oral Q15 Min PRN    Or    glucose-vitamin C (Dex-4) chewable tab 4 tablet  4 tablet Oral Q15 Min PRN    Or    dextrose 50% injection 50 mL  50 mL Intravenous Q15 Min PRN    Or    glucose (Dex4) 15 GM/59ML oral liquid 30 g  30 g Oral Q15 Min PRN    Or    glucose (Glutose) 40% oral gel 30 g  30 g Oral Q15 Min PRN    Or    glucose-vitamin C (Dex-4) chewable tab 8 tablet  8  tablet Oral Q15 Min PRN    insulin aspart (NovoLOG) 100 Units/mL FlexPen 1-10 Units  1-10 Units Subcutaneous TID AC and HS    insulin degludec (Tresiba) 100 units/mL flextouch 25 Units  25 Units Subcutaneous Daily    lactated ringers infusion   Intravenous Continuous    HYDROmorphone (Dilaudid) 1 MG/ML injection 0.2 mg  0.2 mg Intravenous Q2H PRN    Or    HYDROmorphone (Dilaudid) 1 MG/ML injection 0.4 mg  0.4 mg Intravenous Q2H PRN     Allergies: Allergies[1]  SocHx:  No history of smoking;  The patient drinks rare alcohol on social occasions; The patient has no history of IV drug use or other illicit substances.  FamHx: The patient has no family history of colon cancer or other gastrointestinal malignancies;  No family history of ulcer disease, or inflammatory bowel disease  ROS:  In addition to the pertinent positives described above:            Infectious Disease:  No chronic infections or recent fevers prior to the acute illness            Cardiovascular: No history of CAD, prior MI, chest pain, or palpitations            Respiratory: No shortness of breath, asthma, copd, recurrent pneumonia            Hematologic: The patient reports no easy bruising, frequent gum bleeding or nose bleeding;  The patient has no history of known chronic anemia            Dermatologic: The patient reports no recent rashes or chronic skin disorders            Rheumatologic: The patient reports no history of chronic arthritis, myalgias, arthralgias            Genitourinary:  The patient reports no history of recurrent urinary tract infections, hematuria, dysuria, or nephrolithiasis           Psychiatric: anxiety: controlled           Oncologic: The patient reports on history of prior solid tumor or hematologic malignancy           ENT: The patient reports no hoarseness of voice, hearing loss, sinus congestion, tinnitus           Neurologic: The patient reports no history of seizure, stroke, or frequent headaches    PE: BP 97/62 (BP  Location: Left arm)   Pulse 77   Temp 97.9 °F (36.6 °C) (Oral)   Resp 18   Ht 5' 5\" (1.651 m)   Wt 150 lb (68 kg)   LMP 10/01/2024   SpO2 99%   BMI 24.96 kg/m²   Gen: AAO x 3, able to speak in complete sentences  HENT: NCAT, EOMI, PERRL, oropharynx is clear with moist mucosal membranes  Eyes: Sclerae are anicteric  Neck:  Supple without nuchal rigidity; No lymphadenopathy  CV: Regular rate and rhythm, with normal S1 and S2  Resp: Clear to auscultation bilaterally without wheezes; rubs, rhonchi, or rales  Abdomen: Soft, mild LLQ tenderness, non-distended with the presence of bowel sounds; No hepatosplenomegaly; no rebound or guarding; No ascites is clinically apparent; no tympany to percussion  Ext: No peripheral edema or cyanosis  Skin: Warm and dry  Psychiatric: Appropriate mood and congruent affect without obvious depression or anxiety  Rectum: (nurse present at time of exam); no anal fissure or obvious abscess/fistula. Mild discomfort with internal exam. No mass felt.   Labs:   Lab Results   Component Value Date    WBC 6.8 03/26/2025    HGB 10.5 03/26/2025    HCT 30.3 03/26/2025    .0 03/26/2025    CREATSERUM 0.85 03/26/2025    BUN 7 03/26/2025     03/26/2025    K 4.4 03/26/2025    CL 99 03/26/2025    CO2 24.0 03/26/2025     03/26/2025    CA 9.5 03/26/2025    ALB 4.3 03/26/2025    ALKPHO 101 03/26/2025    BILT 0.3 03/26/2025    AST 27 03/26/2025    ALT 16 03/26/2025     Recent Labs   Lab 03/26/25  0330   *   BUN 7*   CREATSERUM 0.85   CA 9.5   *   K 4.4   CL 99   CO2 24.0     Recent Labs   Lab 03/26/25  0330   RBC 3.41*   HGB 10.5*   HCT 30.3*   MCV 88.9   MCH 30.8   MCHC 34.7   RDW 15.9   NEPRELIM 4.31   WBC 6.8   .0       Recent Labs   Lab 03/26/25  0330   ALT 16   AST 27       Imaging:   CT A/P:  Impression   CONCLUSION:  No evidence of bowel obstruction.     2.4 cm right ovarian follicular cyst.     IUD in the uterus.     2.4 cm left renal cyst.     Impression:    Crohn's disease of large intestine with hiwot-anal disease on Remicade q 12 weeks with last dose 2 weeks ago, who presents with abdominal pain and diarrhea  2. DM Type 1 with h/o DKA with IV steroids: elevated glucose on current labs  3. Anemia  Recommendations:   Stool PCR, fecal calprotectin, C diff  TTG IgA, IgA, iron studies, folate, B12  Consider Budesonide when sugars have improved and she has been ruled out for infection  NPO after MN, MRE tomorrow  5. She has consult at [place]  6. Antiemetics, pain meds per hospitalist         Thank you for the consultation, we will follow the patient with you.    Belgica Garcia DO  11:38 AM  3/26/2025  [facility]  [phone]             [1]   Allergies  Allergen Reactions    Vancomycin ANAPHYLAXIS    Morphine RASH    Toradol [Ketorolac Tromethamine] RASH

## 2025-03-26 NOTE — ED QUICK NOTES
Orders for admission, patient is aware of plan and ready to go upstairs. Any questions, please call ED RN Blanca at extension [phone].     Patient Covid vaccination status: Unvaccinated     COVID Test Ordered in ED: None    COVID Suspicion at Admission: N/A    Running Infusions:      Mental Status/LOC at time of transport: A&O x4    Other pertinent information:   CIWA score: N/A   NIH score:  N/A

## 2025-03-27 VITALS
HEART RATE: 63 BPM | BODY MASS INDEX: 24.99 KG/M2 | SYSTOLIC BLOOD PRESSURE: 103 MMHG | HEIGHT: 65 IN | DIASTOLIC BLOOD PRESSURE: 60 MMHG | RESPIRATION RATE: 19 BRPM | WEIGHT: 150 LBS | TEMPERATURE: 98 F | OXYGEN SATURATION: 96 %

## 2025-03-27 LAB
FOLATE SERPL-MCNC: 13.3 NG/ML (ref 5.4–?)
GLUCOSE BLD-MCNC: 283 MG/DL (ref 70–99)
GLUCOSE BLD-MCNC: 297 MG/DL (ref 70–99)
IRON SATN MFR SERPL: 20 %
IRON SERPL-MCNC: 76 UG/DL
TOTAL IRON BINDING CAPACITY: 376 UG/DL (ref 250–425)
TRANSFERRIN SERPL-MCNC: 288 MG/DL (ref 250–380)
TTG IGA SER-ACNC: <0.2 U/ML (ref ?–7)
VIT B12 SERPL-MCNC: 401 PG/ML (ref 211–911)

## 2025-03-27 PROCEDURE — 99239 HOSP IP/OBS DSCHRG MGMT >30: CPT | Performed by: INTERNAL MEDICINE

## 2025-03-27 NOTE — DISCHARGE SUMMARY
[facility]     Esperanza Mauro Patient Status:  Observation    1985 MRN [id number]   Location [facility] 0SW-A Attending Ja Galvin MD   Hosp Day # 0 PCP None Pcp     Date of Admission: 3/26/2025  Date of Discharge:   3/27/2025        Discharge Disposition: AMA    Discharge Diagnosis:  Crohns Disease  Abd Pain  DM1    History of Present Illness: Esperanza Mauro is a 40 year old female with a past medical history of DM1 and crohns disease.  She has had LLQ abd pain with subjective fevers/chills with bloody streaked stools for the past 3 days.  Glucose has been elevated.  In the ED she has not had any BM's.  CT a/p in the ED is neg for acute process.     Brief Synopsis: Pt was admitted and treatd with hydration and pain control. Pt was seen by GI but decided to leave [place] prior to completing her workup.     Lace+ Score: 28  59-90 High Risk  29-58 Medium Risk  0-28   Low Risk       TCM Follow-Up Recommendation:  LACE < 29: Low Risk of readmission after discharge from the hospital; Still recommend for TCM follow-up.      Procedures during hospitalization:   none    Incidental or significant findings and recommendations (brief descriptions):  none    Lab/Test results pending at Discharge:   none    Consultants:  GI    Discharge Medication List:     Discharge Medications        ASK your doctor about these medications        Instructions Prescription details   Amphetamine-Dextroamphet ER 20 MG Cp24  Commonly known as: ADDERALL XR      Take 1 capsule (20 mg total) by mouth every morning.   Refills: 0     amphetamine-dextroamphetamine 10 MG Tabs  Commonly known as: Adderall      Take 1 tablet (10 mg total) by mouth daily.   Refills: 0     escitalopram 20 MG Tabs  Commonly known as: Lexapro      Take 1 tablet (20 mg total) by mouth daily.   Refills: 0     HumaLOG KwikPen 100 UNIT/ML Sopn  Generic drug: Insulin Lispro (1 Unit Dial)      Inject 8-14 Units into the skin 3 (three)  times daily with meals. Plus sliding scale. Carb ratio 1-6 grams. Correction 1-30. Total 50 units daily.   Refills: 0     HYDROmorphone 2 MG Tabs  Commonly known as: Dilaudid  Ask about: Which instructions should I use?      Take 1 tablet (2 mg total) by mouth every 4 (four) hours as needed for Pain.   Refills: 0     Promethegan 25 MG Supp  Generic drug: promethazine      Place 1 suppository (25 mg total) rectally every 6 (six) hours as needed.   Refills: 0     REMICADE IV       Refills: 0     traMADol 50 MG Tabs  Commonly known as: Ultram      Take 1 tablet (50 mg total) by mouth every 8 (eight) hours as needed for Pain.   Refills: 0     Tresiba FlexTouch 100 UNIT/ML Sopn  Generic drug: insulin degludec      Inject 40 Units into the skin daily. Prime 2 units before each dose   Refills: 0              [place]    Follow-up appointment:   No follow-up provider specified.  Appointments for Next 30 Days 3/27/2025 - 2025      None            Vital signs:  Temp:  [98 °F (36.7 °C)-98.4 °F (36.9 °C)] 98.1 °F (36.7 °C)  Pulse:  [62-65] 63  Resp:  [17-19] 19  BP: (103-113)/(60-66) 103/60  SpO2:  [96 %-99 %] 96 %          -----------------------------------------------------------------------------------------------  PATIENT DISCHARGE INSTRUCTIONS: See electronic chart    Darnell Gomez MD    Total time spent on discharge plannin minutes     The  Cures Act makes medical notes like these available to patients in the interest of transparency. Please be advised this is a medical document. Medical documents are intended to carry relevant information, facts as evident, and the clinical opinion of the practitioner. The medical note is intended as peer to peer communication and may appear blunt or direct. It is written in medical language and may contain abbreviations or verbiage that are unfamiliar.

## 2025-03-27 NOTE — PROGRESS NOTES
Gastroenterology Progress Note  Patient Name: Esperanza Mauro  Chief Complaint: Crohn's disease, abdominal pain  S: Pt has not had a BM since admission and thus has not been able to submit stool sample. She reports that that pain is slightly better. She tolerated a grill cheese sandwich.   O: /60 (BP Location: Left arm)   Pulse 63   Temp 98.1 °F (36.7 °C) (Oral)   Resp 19   Ht 5' 5\" (1.651 m)   Wt 150 lb (68 kg)   LMP 10/01/2024   SpO2 96%   BMI 24.96 kg/m²   Gen: AAOx3  CV: RRR with normal S1 / S2  Resp: No increased respiratory effort  Abd: (+)BS, soft, mild LLQ tenderness, non-distended; no rebound or guarding  Ext: No edema or cyanosis  Skin: Warm and dry  Laboratory Data:    Recent Labs   Lab 03/26/25  0330   *   BUN 7*   CREATSERUM 0.85   CA 9.5   *   K 4.4   CL 99   CO2 24.0     Recent Labs   Lab 03/26/25  0330   RBC 3.41*   HGB 10.5*   HCT 30.3*   MCV 88.9   MCH 30.8   MCHC 34.7   RDW 15.9   NEPRELIM 4.31   WBC 6.8   .0       Recent Labs   Lab 03/26/25  0330   ALT 16   AST 27       Impression:   Crohn's disease of large intestine with hiwot-anal disease on Remicade q 12 weeks with last dose 2 weeks ago, who presents with abdominal pain and diarrhea  2. DM Type 1 with h/o DKA with IV steroids: elevated glucose on current labs  3. Iron deficiency Anemia: related to #1; B12 and folate wnl  Recommendations:   Stool PCR, fecal calprotectin, C diff when able  TTG IgA pending  Consider Budesonide when sugars have improved and she has been ruled out for infection  MRE today; ok to resume soft diet following this  5. She has consult at [place]  6. Antiemetics, pain meds per hospitalist      Belgica Garcia DO  9:51 AM  3/27/2025  [facility]  [phone]

## 2025-03-27 NOTE — PLAN OF CARE
Assumed care at 2100  Per Dilaudid .4 mg IVP not helping for left lower quadrant pain.Requested 1mg, she said she usually get 1-2mg of Dilaudid IVP.Offered Dilaudid  2mg PO, per pt, it doesn't work.Informed MD, ordered 1mg Q 4hrs.  IVF, NPO for MRI abd.  Problem: Diabetes/Glucose Control  Goal: Glucose maintained within prescribed range  Description: INTERVENTIONS:- Monitor Blood Glucose as ordered- Assess for signs and symptoms of hyperglycemia and hypoglycemia- Administer ordered medications to maintain glucose within target range- Assess barriers to adequate nutritional intake and initiate nutrition consult as needed- Instruct patient on self management of diabetes  Outcome: Progressing     Problem: Patient/Family Goals  Goal: Patient/Family Long Term Goal  Description: Patient's Long Term Goal: go home  Interventions:-follow up care  -MRI abd  -consult  -pain medicine  - See additional Care Plan goals for specific interventions  Outcome: Progressing  Goal: Patient/Family Short Term Goal  Description: Patient's Short Term Goal: pain controlled  Interventions: - pain medicine as needed  - See additional Care Plan goals for specific interventions  Outcome: Progressing     Problem: PAIN - ADULT  Goal: Verbalizes/displays adequate comfort level or patient's stated pain goal  Description: INTERVENTIONS:- Encourage pt to monitor pain and request assistance- Assess pain using appropriate pain scale- Administer analgesics based on type and severity of pain and evaluate response- Implement non-pharmacological measures as appropriate and evaluate response- Consider cultural and social influences on pain and pain management- Manage/alleviate anxiety- Utilize distraction and/or relaxation techniques- Monitor for opioid side effects- Notify MD/LIP if interventions unsuccessful or patient reports new pain- Anticipate increased pain with activity and pre-medicate as appropriate  Outcome: Progressing      Mr Recio stated he is doing well since his ED visit last month despite feeling a little weak. Mr Recio stated he would reach out to his Hem/Onc to reschedule his missed appointment. Mr Recio stated he forgot his password to access his Mychart so I walked him through how to reset the password, declined needing further assistance at this time.

## 2025-03-27 NOTE — PLAN OF CARE
Resumed care at 0730. Aox4, vitals stable. NPO for MRI today. Ambulating to bathroom, independent. C/o abdominal pain 6-7/10. IV pain meds given as ordered with some relief. Declined to do MRI today, told patient it wouldn't be done until tomorrow, verbalized understanding. Notified GI/hospitalist, ok for soft diet. Tolerated well. Fingerstick done, covered with insulin per order. Pt wanted to leave AMA, signed paperwork and IV removed. Ambulated to lobby.

## 2025-03-27 NOTE — PROGRESS NOTES
[facility]   part of [facility]     Hospitalist Progress Note     Esperanza Mauro Patient Status:  Observation    1985 MRN [id number]   Location [facility] 0SW-A Attending Ja Galvin MD   Hosp Day # 0 PCP None Pcp     Chief Complaint: abd pain    Subjective:     Patient without acute events overnight. Pain slightly better. Wants to wait to get MRI tmrw. Tolerated diet.     Objective:    Review of Systems:   A comprehensive review of systems was completed; pertinent positive and negatives stated in subjective.    Vital signs:  Temp:  [98 °F (36.7 °C)-98.4 °F (36.9 °C)] 98.1 °F (36.7 °C)  Pulse:  [62-65] 63  Resp:  [17-19] 19  BP: (103-113)/(60-66) 103/60  SpO2:  [96 %-99 %] 96 %    Physical Exam:    General: No acute distress  Respiratory: No wheezes, no rhonchi  Cardiovascular: S1, S2, regular rate and rhythm  Abdomen: Soft, Non-tender, non-distended, positive bowel sounds  Neuro: No new focal deficits.   Extremities: No edema      Diagnostic Data:    Labs:  Recent Labs   Lab 25  0330   WBC 6.8   HGB 10.5*   MCV 88.9   .0       Recent Labs   Lab 25  0330   *   BUN 7*   CREATSERUM 0.85   CA 9.5   ALB 4.3   *   K 4.4   CL 99   CO2 24.0   ALKPHO 101*   AST 27   ALT 16   BILT 0.3   TP 6.3       Estimated Glomerular Filtration Rate: 89 mL/min/1.73m2 (result from lab).    No results for input(s): \"TROP\", \"TROPHS\", \"CK\" in the last 168 hours.    No results for input(s): \"PTP\", \"INR\" in the last 168 hours.               Microbiology    No results found for this visit on 25.      Imaging: Reviewed in Epic.    Medications:    glucagon  1 mg Subcutaneous Once    sodium ferric gluconate  125 mg Intravenous Once    escitalopram  20 mg Oral Daily    enoxaparin  40 mg Subcutaneous Daily    insulin aspart  1-68 Units Subcutaneous TID CC    insulin aspart  1-10 Units Subcutaneous TID AC and HS    insulin degludec  25 Units Subcutaneous Daily       Assessment &  Plan:      #CD with possible flare  CT unremarkable for acute process  ESR/CRP normal  R/o infection ( stool PCR, c. Diff)  Hold steroids for now  Cont. Home pain regimen  On remicade as OP  Check MRE today  GI following     #Hidradenitis suppurativa  On remicade      #Chronic pain  Pt endorses PO dilaudid 4mg PRN, [place] 2[place]  F/u with primary prescribing pysician     #DM1 with hyperglycemia  Insulin protocol  Per pt she is on Tresiba twice a day?    Insulin protocol      Darnell Gomez MD    Supplementary Documentation:     Quality:  DVT Mechanical Prophylaxis:   SCDs,    DVT Pharmacologic Prophylaxis   Medication    enoxaparin (Lovenox) 40 MG/0.4ML SUBQ injection 40 mg                Code Status: Not on file  [place]: No urinary catheter in place  [place] Duration (in days):   Central line:    ANNABEL:     Discharge is dependent on: progress  At this point Ms. Mauro is expected to be discharge to: home    The 21st Century Cures Act makes medical notes like these available to patients in the interest of transparency. Please be advised this is a medical document. Medical documents are intended to carry relevant information, facts as evident, and the clinical opinion of the practitioner. The medical note is intended as peer to peer communication and may appear blunt or direct. It is written in medical language and may contain abbreviations or verbiage that are unfamiliar.

## 2025-03-28 NOTE — PAYOR COMM NOTE
THIS WAS AN OBS ADMISSION    Place in observation Once (Order #[id number]) on 3/26/25       DISCHARGE REVIEW    Payor: [employer] [facility] PPO  Subscriber #:  [id number]  Authorization Number: [id number]    Admit date: N/A  Admit time:  N/A  Discharge Date: 3/27/2025 12:17 PM     Admitting Physician: Ja Galvin MD  Primary Care Physician: Pcp, None    Discharge Summary signed by Darnell Gomez MD at 3/27/2025 12:52 PM       Author: Darnell Gomez MD Specialty: HOSPITALIST, Internal Medicine Author Type: Physician    Filed: 3/27/2025 12:52 PM Date of Service: 3/27/2025 12:14 PM Status: Signed     [facility]     Esperanza Mauro Patient Status:  Observation    1985 MRN [id number]   Location [facility] 0SW-A Attending Ja Galvin MD   Hosp Day # 0 PCP None Pcp     Date of Admission: 3/26/2025  Date of Discharge:   3/27/2025    Discharge Disposition: AMA    Discharge Diagnosis:  Crohns Disease  Abd Pain  DM1    History of Present Illness:  40 year old female with a past medical history of DM1 and crohns disease.  She has had LLQ abd pain with subjective fevers/chills with bloody streaked stools for the past 3 days.  Glucose has been elevated.  In the ED she has not had any BM's.  CT a/p in the ED is neg for acute process.     Brief Synopsis: Pt was admitted and treatd with hydration and pain control. Pt was seen by GI but decided to leave [place] prior to completing her workup.     Lace+ Score: 28  59-90 High Risk  29-58 Medium Risk  0-28   Low Risk       TCM Follow-Up Recommendation:  LACE < 29: Low Risk of readmission after discharge from the hospital; Still recommend for TCM follow-up.      Procedures during hospitalization:   none    Incidental or significant findings and recommendations (brief descriptions):  none    Lab/Test results pending at Discharge:   none    Consultants:  GI    Discharge Medication List:  ASK your doctor about these medications         Instructions Prescription details   Amphetamine-Dextroamphet ER 20 MG Cp24  Commonly known as: ADDERALL XR      Take 1 capsule (20 mg total) by mouth every morning.   Refills: 0     amphetamine-dextroamphetamine 10 MG Tabs  Commonly known as: Adderall      Take 1 tablet (10 mg total) by mouth daily.   Refills: 0     escitalopram 20 MG Tabs  Commonly known as: Lexapro      Take 1 tablet (20 mg total) by mouth daily.   Refills: 0     HumaLOG KwikPen 100 UNIT/ML Sopn  Generic drug: Insulin Lispro (1 Unit Dial)      Inject 8-14 Units into the skin 3 (three) times daily with meals. Plus sliding scale. Carb ratio 1-6 grams. Correction 1-30. Total 50 units daily.   Refills: 0     HYDROmorphone 2 MG Tabs  Commonly known as: Dilaudid  Ask about: Which instructions should I use?      Take 1 tablet (2 mg total) by mouth every 4 (four) hours as needed for Pain.   Refills: 0     Promethegan 25 MG Supp  Generic drug: promethazine      Place 1 suppository (25 mg total) rectally every 6 (six) hours as needed.   Refills: 0     REMICADE IV       Refills: 0     traMADol 50 MG Tabs  Commonly known as: Ultram      Take 1 tablet (50 mg total) by mouth every 8 (eight) hours as needed for Pain.   Refills: 0     Tresiba FlexTouch 100 UNIT/ML Sopn  Generic drug: insulin degludec      Inject 40 Units into the skin daily. Prime 2 units before each dose   Refills: 0       [place]    Vital signs:  Temp:  [98 °F (36.7 °C)-98.4 °F (36.9 °C)] 98.1 °F (36.7 °C)  Pulse:  [62-65] 63  Resp:  [17-19] 19  BP: (103-113)/(60-66) 103/60  SpO2:  [96 %-99 %] 96 %    PATIENT DISCHARGE INSTRUCTIONS: See electronic chart    Darnell Gomez MD  3/27/2025 12:52 PM      REVIEWER COMMENTS      FOR FINAL REVIEW/APPROVAL OF ALL OBS DAYS

## 2025-04-07 PROBLEM — E55.9 VITAMIN D DEFICIENCY: Status: ACTIVE | Noted: 2021-07-19

## 2025-04-07 PROBLEM — R19.5 LOOSE STOOLS: Status: ACTIVE | Noted: 2025-04-07

## 2025-04-07 PROBLEM — R19.8 ALTERNATING CONSTIPATION AND DIARRHEA: Status: ACTIVE | Noted: 2025-04-07

## 2025-04-07 PROBLEM — K60.30 ANAL FISTULA: Status: ACTIVE | Noted: 2023-05-27

## 2025-04-07 PROBLEM — R11.2 INTRACTABLE NAUSEA AND VOMITING: Status: ACTIVE | Noted: 2021-06-14

## 2025-04-07 PROBLEM — D80.2 IGA DEFICIENCY  (CMD): Status: ACTIVE | Noted: 2022-07-18

## 2025-04-07 PROBLEM — R73.09 ELEVATED HEMOGLOBIN A1C: Status: ACTIVE | Noted: 2021-06-14

## 2025-04-07 PROBLEM — G43.909 MIGRAINE HEADACHE: Status: ACTIVE | Noted: 2021-06-14

## 2025-04-07 PROBLEM — E87.6 HYPOKALEMIA: Status: ACTIVE | Noted: 2023-10-19

## 2025-04-07 PROBLEM — D84.9 IMMUNOCOMPROMISED STATE  (CMD): Status: ACTIVE | Noted: 2022-09-23

## 2025-04-07 PROBLEM — E10.9 TYPE 1 DIABETES MELLITUS (CMD): Status: ACTIVE | Noted: 2017-12-21

## 2025-04-07 PROBLEM — F32.9 MAJOR DEPRESSIVE DISORDER: Status: ACTIVE | Noted: 2017-11-24

## 2025-04-07 PROBLEM — K60.40 PERIRECTAL FISTULA: Status: ACTIVE | Noted: 2023-03-07

## 2025-04-07 PROBLEM — Z84.2 FAMILY HISTORY OF ENDOMETRIOSIS IN FIRST DEGREE RELATIVE: Status: ACTIVE | Noted: 2018-09-30

## 2025-04-07 PROBLEM — D84.9 IMMUNODEFICIENCY  (CMD): Chronic | Status: ACTIVE | Noted: 2018-07-29

## 2025-04-07 PROBLEM — Z02.89 PAIN MANAGEMENT CONTRACT SIGNED: Status: ACTIVE | Noted: 2022-03-16

## 2025-04-07 PROBLEM — K31.84 DIABETIC GASTROPARESIS  (CMD): Status: ACTIVE | Noted: 2024-06-05

## 2025-04-07 PROBLEM — E03.9 HYPOTHYROIDISM: Status: ACTIVE | Noted: 2022-09-28

## 2025-04-07 PROBLEM — R10.32 LEFT LOWER QUADRANT ABDOMINAL PAIN: Status: ACTIVE | Noted: 2024-05-09

## 2025-04-07 PROBLEM — Z87.19 HISTORY OF CROHN'S DISEASE: Status: ACTIVE | Noted: 2023-03-15

## 2025-04-07 PROBLEM — N39.0 UTI (URINARY TRACT INFECTION): Status: ACTIVE | Noted: 2024-07-06

## 2025-04-07 PROBLEM — K50.10 CROHN'S DISEASE OF COLON (CMD): Status: ACTIVE | Noted: 2023-01-16

## 2025-04-07 PROBLEM — N83.202 CYST OF LEFT OVARY: Status: ACTIVE | Noted: 2019-01-16

## 2025-04-07 PROBLEM — Z46.81 INSULIN PUMP TITRATION: Status: ACTIVE | Noted: 2022-12-19

## 2025-04-07 PROBLEM — E11.43 DIABETIC GASTROPARESIS  (CMD): Status: ACTIVE | Noted: 2024-06-05

## 2025-04-07 PROBLEM — K62.89 PERIANAL PAIN: Status: ACTIVE | Noted: 2022-03-22

## 2025-04-07 PROBLEM — G47.00 INSOMNIA: Status: ACTIVE | Noted: 2023-07-04

## 2025-04-07 PROBLEM — Z98.890 H/O ABDOMINAL SURGERY: Status: ACTIVE | Noted: 2025-04-07

## 2025-04-07 PROBLEM — F11.20 OPIOID DEPENDENCE (CMD): Status: ACTIVE | Noted: 2023-04-27

## 2025-04-07 PROBLEM — K50.90 CROHN'S DISEASE  (CMD): Status: ACTIVE | Noted: 2022-12-21

## 2025-04-07 PROBLEM — D64.9 ANEMIA: Status: ACTIVE | Noted: 2022-03-17

## 2025-04-07 PROBLEM — M67.459: Status: ACTIVE | Noted: 2024-02-13

## 2025-04-07 PROBLEM — L70.9 ACNE: Status: ACTIVE | Noted: 2021-06-18

## 2025-04-07 PROBLEM — L02.31 ABSCESS OF GLUTEAL CLEFT: Status: ACTIVE | Noted: 2023-07-04

## 2025-04-07 PROBLEM — L02.213 CUTANEOUS ABSCESS OF CHEST WALL: Status: ACTIVE | Noted: 2017-09-07

## 2025-04-07 PROBLEM — M79.18 BUTTOCK PAIN: Status: ACTIVE | Noted: 2023-04-18

## 2025-04-07 PROBLEM — B99.9 RECURRENT INFECTIONS: Status: ACTIVE | Noted: 2017-12-21

## 2025-04-07 PROBLEM — E87.1 HYPONATREMIA: Status: ACTIVE | Noted: 2022-09-28

## 2025-04-07 PROBLEM — G62.9 PERIPHERAL NEUROPATHY: Status: ACTIVE | Noted: 2023-04-27

## 2025-04-07 PROBLEM — E27.49 SECONDARY ADRENAL INSUFFICIENCY  (CMD): Status: ACTIVE | Noted: 2022-09-28

## 2025-04-07 PROBLEM — Z87.2 PERSONAL HISTORY OF DISEASES OF THE SKIN AND SUBCUTANEOUS TISSUE: Status: ACTIVE | Noted: 2022-06-09

## 2025-04-07 PROBLEM — Z97.5 PRESENCE OF INTRAUTERINE CONTRACEPTIVE DEVICE: Status: ACTIVE | Noted: 2018-10-02

## 2025-04-07 PROBLEM — K61.1 PERIRECTAL ABSCESS: Status: ACTIVE | Noted: 2023-07-07

## 2025-04-07 PROBLEM — R10.9 ABDOMINAL PAIN: Status: ACTIVE | Noted: 2022-12-27

## 2025-04-07 PROBLEM — K64.9 HEMORRHOIDS: Status: ACTIVE | Noted: 2017-09-19

## 2025-04-07 PROBLEM — D80.1 HYPOGAMMAGLOBULINEMIA  (CMD): Chronic | Status: ACTIVE | Noted: 2020-11-17

## 2025-04-07 PROBLEM — R78.81 POSITIVE BLOOD CULTURE: Status: ACTIVE | Noted: 2023-03-15

## 2025-04-07 PROBLEM — L03.113 CELLULITIS OF RIGHT UPPER EXTREMITY: Status: ACTIVE | Noted: 2017-11-05

## 2025-04-07 PROBLEM — K21.9 GERD (GASTROESOPHAGEAL REFLUX DISEASE): Status: ACTIVE | Noted: 2017-11-25

## 2025-04-07 PROBLEM — R78.81 BACTEREMIA: Status: ACTIVE | Noted: 2023-03-15

## 2025-04-07 PROBLEM — K60.2 ANAL FISSURE: Status: ACTIVE | Noted: 2024-05-09

## 2025-04-07 PROBLEM — G89.4 CHRONIC PAIN DISORDER: Status: ACTIVE | Noted: 2022-03-16

## 2025-04-07 PROBLEM — D49.519 NEOPLASM OF KIDNEY: Status: ACTIVE | Noted: 2021-06-14

## 2025-04-08 ENCOUNTER — APPOINTMENT (OUTPATIENT)
Dept: CT IMAGING | Facility: HOSPITAL | Age: 40
End: 2025-04-08
Attending: EMERGENCY MEDICINE
Payer: COMMERCIAL

## 2025-04-08 ENCOUNTER — HOSPITAL ENCOUNTER (INPATIENT)
Facility: HOSPITAL | Age: 40
LOS: 4 days | Discharge: HOME | End: 2025-04-12
Attending: EMERGENCY MEDICINE | Admitting: HOSPITALIST
Payer: COMMERCIAL

## 2025-04-08 DIAGNOSIS — K62.89 RECTAL PAIN: ICD-10-CM

## 2025-04-08 DIAGNOSIS — K50.10 EXACERBATION OF CROHN'S DISEASE OF LARGE INTESTINE (HCC): ICD-10-CM

## 2025-04-08 DIAGNOSIS — E10.65 TYPE 1 DIABETES MELLITUS WITH HYPERGLYCEMIA (HCC): ICD-10-CM

## 2025-04-08 DIAGNOSIS — E10.649 UNCONTROLLED TYPE 1 DIABETES MELLITUS WITH HYPOGLYCEMIA WITHOUT COMA (HCC): Primary | ICD-10-CM

## 2025-04-08 LAB
ALBUMIN SERPL-MCNC: 4.4 G/DL (ref 3.2–4.8)
ALBUMIN/GLOB SERPL: 1.8 {RATIO} (ref 1–2)
ALP LIVER SERPL-CCNC: 118 U/L
ALT SERPL-CCNC: 15 U/L
ANION GAP SERPL CALC-SCNC: 11 MMOL/L (ref 0–18)
AST SERPL-CCNC: 30 U/L (ref ?–34)
BASE EXCESS BLDV CALC-SCNC: -0.2 MMOL/L
BASOPHILS # BLD AUTO: 0.03 X10(3) UL (ref 0–0.2)
BASOPHILS NFR BLD AUTO: 0.5 %
BILIRUB SERPL-MCNC: <0.2 MG/DL (ref 0.3–1.2)
BILIRUB UR QL STRIP.AUTO: NEGATIVE
BUN BLD-MCNC: 11 MG/DL (ref 9–23)
CALCIUM BLD-MCNC: 9.4 MG/DL (ref 8.7–10.6)
CHLORIDE SERPL-SCNC: 100 MMOL/L (ref 98–112)
CLARITY UR REFRACT.AUTO: CLEAR
CO2 SERPL-SCNC: 21 MMOL/L (ref 21–32)
COLOR UR AUTO: COLORLESS
CREAT BLD-MCNC: 0.85 MG/DL
CRP SERPL-MCNC: <0.4 MG/DL (ref ?–0.5)
EGFRCR SERPLBLD CKD-EPI 2021: 89 ML/MIN/1.73M2 (ref 60–?)
EOSINOPHIL # BLD AUTO: 0.03 X10(3) UL (ref 0–0.7)
EOSINOPHIL NFR BLD AUTO: 0.5 %
ERYTHROCYTE [DISTWIDTH] IN BLOOD BY AUTOMATED COUNT: 15.2 %
ERYTHROCYTE [SEDIMENTATION RATE] IN BLOOD: 6 MM/HR
FIO2: 21 %
GLOBULIN PLAS-MCNC: 2.4 G/DL (ref 2–3.5)
GLUCOSE BLD-MCNC: 268 MG/DL (ref 70–99)
GLUCOSE BLD-MCNC: 522 MG/DL (ref 70–99)
GLUCOSE BLD-MCNC: 525 MG/DL (ref 70–99)
GLUCOSE UR STRIP.AUTO-MCNC: >1000 MG/DL
HCO3 BLDV-SCNC: 24.6 MEQ/L (ref 22–26)
HCT VFR BLD AUTO: 32.6 %
HGB BLD-MCNC: 11.4 G/DL
IMM GRANULOCYTES # BLD AUTO: 0.04 X10(3) UL (ref 0–1)
IMM GRANULOCYTES NFR BLD: 0.7 %
KETONES UR STRIP.AUTO-MCNC: NEGATIVE MG/DL
LEUKOCYTE ESTERASE UR QL STRIP.AUTO: NEGATIVE
LYMPHOCYTES # BLD AUTO: 1.76 X10(3) UL (ref 1–4)
LYMPHOCYTES NFR BLD AUTO: 29.2 %
MCH RBC QN AUTO: 31.1 PG (ref 26–34)
MCHC RBC AUTO-ENTMCNC: 35 G/DL (ref 31–37)
MCV RBC AUTO: 88.8 FL
MONOCYTES # BLD AUTO: 0.36 X10(3) UL (ref 0.1–1)
MONOCYTES NFR BLD AUTO: 6 %
NEUTROPHILS # BLD AUTO: 3.8 X10 (3) UL (ref 1.5–7.7)
NEUTROPHILS # BLD AUTO: 3.8 X10(3) UL (ref 1.5–7.7)
NEUTROPHILS NFR BLD AUTO: 63.1 %
NITRITE UR QL STRIP.AUTO: NEGATIVE
OSMOLALITY SERPL CALC.SUM OF ELEC: 297 MOSM/KG (ref 275–295)
OXYHGB MFR BLDV: 90.7 % (ref 72–78)
PCO2 BLDV: 31 MM HG (ref 38–50)
PH BLDV: 7.47 [PH] (ref 7.33–7.43)
PH UR STRIP.AUTO: 7 [PH] (ref 5–8)
PLATELET # BLD AUTO: 337 10(3)UL (ref 150–450)
PLATELETS.RETICULATED NFR BLD AUTO: 1.6 % (ref 0–7)
PO2 BLDV: 160 MM HG (ref 30–50)
POTASSIUM SERPL-SCNC: 4.8 MMOL/L (ref 3.5–5.1)
PROT SERPL-MCNC: 6.8 G/DL (ref 5.7–8.2)
PROT UR STRIP.AUTO-MCNC: NEGATIVE MG/DL
RBC # BLD AUTO: 3.67 X10(6)UL
RBC UR QL AUTO: NEGATIVE
SODIUM SERPL-SCNC: 132 MMOL/L (ref 136–145)
SP GR UR STRIP.AUTO: >1.03 (ref 1–1.03)
UROBILINOGEN UR STRIP.AUTO-MCNC: NORMAL MG/DL
WBC # BLD AUTO: 6 X10(3) UL (ref 4–11)

## 2025-04-08 PROCEDURE — 74177 CT ABD & PELVIS W/CONTRAST: CPT | Performed by: EMERGENCY MEDICINE

## 2025-04-08 RX ORDER — INSULIN ASPART 100 [IU]/ML
0.2 INJECTION, SOLUTION INTRAVENOUS; SUBCUTANEOUS ONCE
Status: COMPLETED | OUTPATIENT
Start: 2025-04-08 | End: 2025-04-08

## 2025-04-08 RX ORDER — ONDANSETRON 2 MG/ML
4 INJECTION INTRAMUSCULAR; INTRAVENOUS ONCE
Status: DISCONTINUED | OUTPATIENT
Start: 2025-04-08 | End: 2025-04-12

## 2025-04-08 RX ORDER — METRONIDAZOLE 500 MG/100ML
500 INJECTION, SOLUTION INTRAVENOUS ONCE
Status: COMPLETED | OUTPATIENT
Start: 2025-04-08 | End: 2025-04-09

## 2025-04-08 RX ORDER — NICOTINE POLACRILEX 4 MG
LOZENGE BUCCAL
Status: COMPLETED
Start: 2025-04-08 | End: 2025-04-08

## 2025-04-08 RX ORDER — HYDROMORPHONE HYDROCHLORIDE 1 MG/ML
1 INJECTION, SOLUTION INTRAMUSCULAR; INTRAVENOUS; SUBCUTANEOUS ONCE
Status: COMPLETED | OUTPATIENT
Start: 2025-04-08 | End: 2025-04-08

## 2025-04-09 LAB
ANION GAP SERPL CALC-SCNC: 6 MMOL/L (ref 0–18)
BASOPHILS # BLD AUTO: 0.02 X10(3) UL (ref 0–0.2)
BASOPHILS NFR BLD AUTO: 0.4 %
BUN BLD-MCNC: 8 MG/DL (ref 9–23)
CALCIUM BLD-MCNC: 8.9 MG/DL (ref 8.7–10.6)
CHLORIDE SERPL-SCNC: 107 MMOL/L (ref 98–112)
CO2 SERPL-SCNC: 26 MMOL/L (ref 21–32)
CREAT BLD-MCNC: 0.54 MG/DL (ref 0.55–1.02)
CRP SERPL-MCNC: <0.4 MG/DL (ref ?–0.5)
EGFRCR SERPLBLD CKD-EPI 2021: 119 ML/MIN/1.73M2 (ref 60–?)
EOSINOPHIL # BLD AUTO: 0.05 X10(3) UL (ref 0–0.7)
EOSINOPHIL NFR BLD AUTO: 1 %
ERYTHROCYTE [DISTWIDTH] IN BLOOD BY AUTOMATED COUNT: 15.3 %
GLUCOSE BLD-MCNC: 112 MG/DL (ref 70–99)
GLUCOSE BLD-MCNC: 129 MG/DL (ref 70–99)
GLUCOSE BLD-MCNC: 163 MG/DL (ref 70–99)
GLUCOSE BLD-MCNC: 178 MG/DL (ref 70–99)
GLUCOSE BLD-MCNC: 190 MG/DL (ref 70–99)
GLUCOSE BLD-MCNC: 202 MG/DL (ref 70–99)
GLUCOSE BLD-MCNC: 303 MG/DL (ref 70–99)
GLUCOSE BLD-MCNC: 361 MG/DL (ref 70–99)
GLUCOSE BLD-MCNC: 45 MG/DL (ref 70–99)
GLUCOSE BLD-MCNC: 68 MG/DL (ref 70–99)
GLUCOSE BLD-MCNC: 70 MG/DL (ref 70–99)
GLUCOSE BLD-MCNC: 81 MG/DL (ref 70–99)
GLUCOSE BLD-MCNC: 90 MG/DL (ref 70–99)
HCT VFR BLD AUTO: 30.2 % (ref 35–48)
HCT VFR BLD AUTO: 31.9 % (ref 35–48)
HGB BLD-MCNC: 10.1 G/DL (ref 12–16)
HGB BLD-MCNC: 10.7 G/DL (ref 12–16)
IMM GRANULOCYTES # BLD AUTO: 0.01 X10(3) UL (ref 0–1)
IMM GRANULOCYTES NFR BLD: 0.2 %
LYMPHOCYTES # BLD AUTO: 2.02 X10(3) UL (ref 1–4)
LYMPHOCYTES NFR BLD AUTO: 41.7 %
MCH RBC QN AUTO: 30.3 PG (ref 26–34)
MCHC RBC AUTO-ENTMCNC: 33.4 G/DL (ref 31–37)
MCV RBC AUTO: 90.7 FL (ref 80–100)
MONOCYTES # BLD AUTO: 0.47 X10(3) UL (ref 0.1–1)
MONOCYTES NFR BLD AUTO: 9.7 %
NEUTROPHILS # BLD AUTO: 2.27 X10 (3) UL (ref 1.5–7.7)
NEUTROPHILS # BLD AUTO: 2.27 X10(3) UL (ref 1.5–7.7)
NEUTROPHILS NFR BLD AUTO: 47 %
OSMOLALITY SERPL CALC.SUM OF ELEC: 291 MOSM/KG (ref 275–295)
PLATELET # BLD AUTO: 281 10(3)UL (ref 150–450)
POTASSIUM SERPL-SCNC: 3.6 MMOL/L (ref 3.5–5.1)
RBC # BLD AUTO: 3.33 X10(6)UL (ref 3.8–5.3)
SODIUM SERPL-SCNC: 139 MMOL/L (ref 136–145)
WBC # BLD AUTO: 4.8 X10(3) UL (ref 4–11)

## 2025-04-09 PROCEDURE — 99223 1ST HOSP IP/OBS HIGH 75: CPT | Performed by: HOSPITALIST

## 2025-04-09 PROCEDURE — 99222 1ST HOSP IP/OBS MODERATE 55: CPT | Performed by: SURGERY

## 2025-04-09 RX ORDER — HYDROMORPHONE HYDROCHLORIDE 4 MG/1
4 TABLET ORAL EVERY 4 HOURS PRN
COMMUNITY
End: 2025-04-23 | Stop reason: CLARIF

## 2025-04-09 RX ORDER — HYDROMORPHONE HYDROCHLORIDE 1 MG/ML
1 INJECTION, SOLUTION INTRAMUSCULAR; INTRAVENOUS; SUBCUTANEOUS ONCE
Status: COMPLETED | OUTPATIENT
Start: 2025-04-09 | End: 2025-04-09

## 2025-04-09 RX ORDER — HYDROMORPHONE HYDROCHLORIDE 1 MG/ML
2 INJECTION, SOLUTION INTRAMUSCULAR; INTRAVENOUS; SUBCUTANEOUS EVERY 2 HOUR PRN
Refills: 0 | Status: DISCONTINUED | OUTPATIENT
Start: 2025-04-09 | End: 2025-04-12

## 2025-04-09 RX ORDER — ESCITALOPRAM OXALATE 10 MG/1
20 TABLET ORAL DAILY
Status: DISCONTINUED | OUTPATIENT
Start: 2025-04-09 | End: 2025-04-12

## 2025-04-09 RX ORDER — DEXTROSE MONOHYDRATE 25 G/50ML
50 INJECTION, SOLUTION INTRAVENOUS
Status: DISCONTINUED | OUTPATIENT
Start: 2025-04-09 | End: 2025-04-12

## 2025-04-09 RX ORDER — NICOTINE POLACRILEX 4 MG
15 LOZENGE BUCCAL
Status: DISCONTINUED | OUTPATIENT
Start: 2025-04-09 | End: 2025-04-12

## 2025-04-09 RX ORDER — HYDROCODONE BITARTRATE AND ACETAMINOPHEN 5; 325 MG/1; MG/1
1 TABLET ORAL EVERY 4 HOURS PRN
Status: DISCONTINUED | OUTPATIENT
Start: 2025-04-09 | End: 2025-04-11

## 2025-04-09 RX ORDER — DEXTROSE MONOHYDRATE AND SODIUM CHLORIDE 5; .9 G/100ML; G/100ML
INJECTION, SOLUTION INTRAVENOUS CONTINUOUS
Status: DISCONTINUED | OUTPATIENT
Start: 2025-04-09 | End: 2025-04-11

## 2025-04-09 RX ORDER — HYDROMORPHONE HYDROCHLORIDE 1 MG/ML
0.5 INJECTION, SOLUTION INTRAMUSCULAR; INTRAVENOUS; SUBCUTANEOUS EVERY 4 HOURS PRN
Refills: 0 | Status: DISCONTINUED | OUTPATIENT
Start: 2025-04-09 | End: 2025-04-09

## 2025-04-09 RX ORDER — HYDROMORPHONE HYDROCHLORIDE 1 MG/ML
0.4 INJECTION, SOLUTION INTRAMUSCULAR; INTRAVENOUS; SUBCUTANEOUS EVERY 2 HOUR PRN
Status: DISCONTINUED | OUTPATIENT
Start: 2025-04-09 | End: 2025-04-09

## 2025-04-09 RX ORDER — HYDROMORPHONE HYDROCHLORIDE 1 MG/ML
1 INJECTION, SOLUTION INTRAMUSCULAR; INTRAVENOUS; SUBCUTANEOUS EVERY 2 HOUR PRN
Refills: 0 | Status: DISCONTINUED | OUTPATIENT
Start: 2025-04-09 | End: 2025-04-12

## 2025-04-09 RX ORDER — SODIUM CHLORIDE 9 MG/ML
INJECTION, SOLUTION INTRAVENOUS CONTINUOUS
Status: DISCONTINUED | OUTPATIENT
Start: 2025-04-09 | End: 2025-04-09

## 2025-04-09 RX ORDER — INSULIN DEGLUDEC 100 U/ML
10 INJECTION, SOLUTION SUBCUTANEOUS DAILY
Status: DISCONTINUED | OUTPATIENT
Start: 2025-04-09 | End: 2025-04-12

## 2025-04-09 RX ORDER — HYDROMORPHONE HYDROCHLORIDE 1 MG/ML
0.2 INJECTION, SOLUTION INTRAMUSCULAR; INTRAVENOUS; SUBCUTANEOUS EVERY 2 HOUR PRN
Status: DISCONTINUED | OUTPATIENT
Start: 2025-04-09 | End: 2025-04-09

## 2025-04-09 RX ORDER — ONDANSETRON 2 MG/ML
4 INJECTION INTRAMUSCULAR; INTRAVENOUS EVERY 6 HOURS PRN
Status: DISCONTINUED | OUTPATIENT
Start: 2025-04-09 | End: 2025-04-12

## 2025-04-09 RX ORDER — HYDROMORPHONE HYDROCHLORIDE 1 MG/ML
1 INJECTION, SOLUTION INTRAMUSCULAR; INTRAVENOUS; SUBCUTANEOUS EVERY 4 HOURS PRN
Refills: 0 | Status: DISCONTINUED | OUTPATIENT
Start: 2025-04-09 | End: 2025-04-09

## 2025-04-09 RX ORDER — HYDROMORPHONE HYDROCHLORIDE 1 MG/ML
0.5 INJECTION, SOLUTION INTRAMUSCULAR; INTRAVENOUS; SUBCUTANEOUS EVERY 2 HOUR PRN
Refills: 0 | Status: DISCONTINUED | OUTPATIENT
Start: 2025-04-09 | End: 2025-04-12

## 2025-04-09 RX ORDER — METRONIDAZOLE 500 MG/100ML
500 INJECTION, SOLUTION INTRAVENOUS EVERY 12 HOURS
Status: DISCONTINUED | OUTPATIENT
Start: 2025-04-09 | End: 2025-04-11

## 2025-04-09 RX ORDER — NICOTINE POLACRILEX 4 MG
30 LOZENGE BUCCAL
Status: DISCONTINUED | OUTPATIENT
Start: 2025-04-09 | End: 2025-04-12

## 2025-04-09 RX ORDER — PROCHLORPERAZINE EDISYLATE 5 MG/ML
5 INJECTION INTRAMUSCULAR; INTRAVENOUS EVERY 8 HOURS PRN
Status: DISCONTINUED | OUTPATIENT
Start: 2025-04-09 | End: 2025-04-12

## 2025-04-09 RX ORDER — HYDROCODONE BITARTRATE AND ACETAMINOPHEN 5; 325 MG/1; MG/1
2 TABLET ORAL EVERY 4 HOURS PRN
Status: DISCONTINUED | OUTPATIENT
Start: 2025-04-09 | End: 2025-04-11

## 2025-04-09 RX ORDER — POLYETHYLENE GLYCOL 3350 17 G/17G
17 POWDER, FOR SOLUTION ORAL 2 TIMES DAILY
Status: DISCONTINUED | OUTPATIENT
Start: 2025-04-09 | End: 2025-04-12

## 2025-04-09 RX ORDER — ACETAMINOPHEN 325 MG/1
650 TABLET ORAL EVERY 4 HOURS PRN
Status: DISCONTINUED | OUTPATIENT
Start: 2025-04-09 | End: 2025-04-11

## 2025-04-09 NOTE — ED QUICK NOTES
Orders for admission, patient is aware of plan and ready to go upstairs. Any questions, please call ED RN Alyx at extension [phone].     Patient Covid vaccination status: Unvaccinated     COVID Test Ordered in ED: None    COVID Suspicion at Admission: N/A    Running Infusions:  None    Mental Status/LOC at time of transport: A+OX4    Other pertinent information:   CIWA score: N/A   NIH score:  N/A

## 2025-04-09 NOTE — ED INITIAL ASSESSMENT (HPI)
S/P rectal surgery due to Rectal fistula 6 days. Sudden onset of rectal pain, + rectal dscharge to site. Elevated blood sugar of >500s since am + DM type I. Unable to control sugar since am. 10units insulin given PTA.. + abd pain

## 2025-04-09 NOTE — H&P
[facility]     Esperanza Mauro Patient Status:  Inpatient    1985 MRN [id number]   Location [facility] 4NW-A Attending Katina Ambrosio MD   Hosp Day # 1 PCP None Pcp     Chief Complaint: rectal pain and bloody diarrhea    Subjective:    History of Present Illness:     Esperanza Mauro is a 40 year old female with medical history of Crohn's disease and DM type I who present to the ER with complaints of rectal pain .  She recently had a seton procedure done at [facility] and reported was doing well afterwards.  About 3 days ago she developed left lower abdominal pain and diarrhea which was bloody.  She reports her symptoms are similar to a crohn's flare. She denies a fever, chills, vomiting but was feeling very nauseated when her sugars were running high.  She came to the ER as her rectal pain was very severe.  She was noted to be hyperglycemia with an initial BS over 500 but not in DKA.       History/Other:    Past Medical History:  Past Medical History:    Crohn's disease (HCC)    Type 1 diabetes mellitus (HCC)     Past Surgical History:   Past Surgical History:   Procedure Laterality Date    I&d deep absc neck/chst+rib cut      Other surgical history  2025    [facility]. rectal fistula repair      Family History:   History reviewed. No pertinent family history.  Social History:    reports that she has never smoked. She has never used smokeless tobacco. She reports that she does not currently use alcohol. She reports that she does not use drugs.     Allergies: Allergies[1]    Medications:  Medications Ordered Prior to Encounter[2]    Review of Systems:   A comprehensive review of systems was completed.    Pertinent positives and negatives noted in the HPI.    Objective:   Physical Exam:    BP 96/52 (BP Location: Right arm)   Pulse 86   Temp 97.6 °F (36.4 °C) (Temporal)   Resp 16   Ht 5' 5\" (1.651 m)   Wt 140 lb (63.5 kg)   LMP 10/01/2024   SpO2  96%   BMI 23.30 kg/m²   General: No acute distress, Alert  Respiratory: No rhonchi, no wheezes  Cardiovascular: S1, S2. Regular rate and rhythm  Abdomen: Soft, Non-tender, non-distended, positive bowel sounds  Neuro: No new focal deficits  Extremities: No edema      Results:    Labs:      Labs Last 24 Hours:    Recent Labs   Lab 04/08/25 2034   RBC 3.67*   HGB 11.4*   HCT 32.6*   MCV 88.8   MCH 31.1   MCHC 35.0   RDW 15.2   NEPRELIM 3.80   WBC 6.0   .0       Recent Labs   Lab 04/08/25 2040   *   BUN 11   CREATSERUM 0.85   EGFRCR 89   CA 9.4   ALB 4.4   *   K 4.8      CO2 21.0   ALKPHO 118*   AST 30   ALT 15   BILT <0.2*   TP 6.8       Estimated Glomerular Filtration Rate: 89 mL/min/1.73m2 (result from lab).    No results found for: \"PT\", \"INR\"    No results for input(s): \"TROP\", \"TROPHS\", \"CK\" in the last 168 hours.    No results for input(s): \"TROP\", \"PBNP\" in the last 168 hours.    No results for input(s): \"PCT\" in the last 168 hours.    Imaging: Imaging data reviewed in Epic.    Assessment & Plan:      #Rectal pain   #Bloody diarrhea  #Crohn's disease  -recent seton surgery at [facility]  -GI consulted  -trend hemoglobin  -Flagyl  -CT negative for perirectal abscess  -pain control  -on remicaide OP    #Hyponatremia  -IVF    #DM type I  -hyperglycemic in ER, but was hypoglycemic on arrival to floors  -will start hyperglycemia protocol- decrease basal insulin and start in am  -carb counting and SSI      Plan of care discussed with patient and ER physician    Katina Ambrosio MD    Supplementary Documentation:     The 21st Century Cures Act makes medical notes like these available to patients in the interest of transparency. Please be advised this is a medical document. Medical documents are intended to carry relevant information, facts as evident, and the clinical opinion of the practitioner. The medical note is intended as peer to peer communication and may appear blunt or direct.  It is written in medical language and may contain abbreviations or verbiage that are unfamiliar.               **Certification      PHYSICIAN Certification of Need for Inpatient Hospitalization - Initial Certification    Patient will require inpatient services that will reasonably be expected to span two midnight's based on the clinical documentation in H+P.   Based on patients current state of illness, I anticipate that, after discharge, patient will require TBD.                        [1]   Allergies  Allergen Reactions    Vancomycin ANAPHYLAXIS    Morphine RASH    Toradol [Ketorolac Tromethamine] RASH   [2]   Current Facility-Administered Medications on File Prior to Encounter   Medication Dose Route Frequency Provider Last Rate Last Admin    [COMPLETED] sodium ferric gluconate (Ferrlecit) 125 mg in sodium chloride 0.9% 100mL IVPB premix  125 mg Intravenous Once Belgica Garcia,  100 mL/hr at 03/27/25 1132 125 mg at 03/27/25 1132    [COMPLETED] sodium chloride 0.9 % IV bolus 1,000 mL  1,000 mL Intravenous Once Brandt Garcia MD   Stopped at 03/26/25 0524    [COMPLETED] insulin aspart (NovoLOG) 100 Units/mL vial 14 Units  0.2 Units/kg Subcutaneous Once Brandt Garcia MD   14 Units at 03/26/25 0335    [COMPLETED] ondansetron (Zofran) 4 MG/2ML injection 4 mg  4 mg Intravenous Once Brandt Garcia MD   4 mg at 03/26/25 0338    [COMPLETED] HYDROmorphone (Dilaudid) 1 MG/ML injection 1 mg  1 mg Intravenous Once Brandt Garcia MD   1 mg at 03/26/25 0327    [COMPLETED] iopamidol 76% (ISOVUE-370) injection for power injector  80 mL Intravenous ONCE PRN Brandt Garcia MD   80 mL at 03/26/25 0413     Current Outpatient Medications on File Prior to Encounter   Medication Sig Dispense Refill    escitalopram 20 MG Oral Tab Take 1 tablet (20 mg total) by mouth daily.      inFLIXimab (REMICADE IV)       Amphetamine-Dextroamphet ER 20 MG Oral Capsule SR 24 Hr Take 1 capsule (20 mg total) by mouth every  morning. (Patient taking differently: Take 1 capsule (20 mg total) by mouth daily as needed.)      amphetamine-dextroamphetamine 10 MG Oral Tab Take 1 tablet (10 mg total) by mouth daily. (Patient taking differently: Take 1 tablet (10 mg total) by mouth daily as needed.)      TRESIBA FLEXTOUCH 100 UNIT/ML Subcutaneous Solution Pen-injector Inject 40 Units into the skin daily. Prime 2 units before each dose (Patient taking differently: Inject 15 Units into the skin 2 (two) times daily. Prime 2 units before each dose)      HUMALOG KWIKPEN 100 UNIT/ML Subcutaneous Solution Pen-injector Inject 8-14 Units into the skin 3 (three) times daily with meals. Plus sliding scale. Carb ratio 1-6 grams. Correction 1-30. Total 50 units daily. (Patient taking differently: Inject 3-6 Units into the skin 3 (three) times daily with meals. Per sliding scale)      PROMETHEGAN 25 MG Rectal Suppos Place 1 suppository (25 mg total) rectally every 6 (six) hours as needed.

## 2025-04-09 NOTE — PLAN OF CARE
Patient is A&Ox4 on room air. C/o pain in rectum, PRN meds given per MAR with relief. Meds given per MAR. Awaiting bowel movement for stool sample. On full liquid diet, tolerating well. General surgery consult placed, Gely GARCIA notified. Safety precautions in place, call light in reach.

## 2025-04-09 NOTE — CONSULTS
[facility]                       Gastroenterology Consultation-[facility]    Esperanza Mauro Patient Status:  Inpatient    1985 MRN [id number]   Location [facility] 4NW-A Attending Wilson Narayan,    Hosp Day # 1 PCP None Pcp     Reason for consultation: Rectal pain; hx of Crohn's   HPI: This is a 40 yr-old female with PMhx that includes uncontrolled DM (A1C 9.7) and Crohn's disease of the colon (dx 8 yrs ago; follows with her established general surgeon at [facility] who prescribes Remicade 7.5 mg/q 12 wks--last infusion 4 weeks ago) who carries a hx of hiwot-anal disease r/t hidradenitis suppurativa and more recently fistulas requiring Seton placement (reports 70+ surgeries at [facility]) who presented to ER yesterday evening with acute on chronic rectal pain and rectal discharge.   Pt underwent initially presented 3/26 - 3/27/2025 with similar pain, diarrhea and left AMA.  Following that admission she underwent an anorectal exam under anesthesia 4/3 at [facility] for chronic anorectal pain which revealed a left posterolateral intersphincteric fistula s/p seton placement of tract + perianal + pudendal block was administered for pain control. Of note, pt had concern at the time for possible vaginal fistula and no additional tracts noted in the anorectum.   Patient reports feeling well after her evaluation at the [facility] until 2-3 days ago when she developed recurrent LLQ pain which progressed to loose stools.  Patient reports 3 watery stools yesterday mixed with streaks of red blood.  Prior to acute symptoms she reports daily BMs without overt bleeding.  She reports her chronic abdominal pain/rectal pain is treated with oral Dilaudid + Norco--unable to recall frequency of use.  Patient reports transient nausea yesterday however no vomiting, fever, or chills.  No known sick contacts, dietary indiscretions, changes in dietary patterns, recent ABX, or travel apart from  going to [place].  Most recent flexible sigmoidoscopy 9/2024 (Dr. Flower Diaz; [facility] [place]) for abdominal pain, diarrhea, blood in stool revealed normal left colon and retroflexion  CT A/P IV suggests moderate feces in the colon, no large or small bowel dilation, known seton in the anal rectal junction with no drainable fluid collection in this area; labs normal WBC, stable anemia (Hgb 10-11), marked hyperglycemia (glucose > 500 on arrival), normal kidney function, normal albumin  Since admission, no BMs or overt GI bleeding and she has tolerated clears without difficulty.  She continues to require frequent IV narcotics to help manage pain  PMHx: Past Medical History[1]             PSHx: Past Surgical History[2]  Medications: Current Medications[3]  Allergies: Allergies[4]  Social HX: Short Social Hx on File[5]   FamHx: The patient has no family history of colon cancer or other gastrointestinal malignancies;  No family history of ulcer disease, or inflammatory bowel disease  ROS:  In addition to the pertinent positives described above:            Infectious Disease:  No chronic infections or recent fevers prior to the acute illness            Cardiovascular: No history of CAD, prior MI, chest pain, or palpitations            Respiratory: No shortness of breath, asthma, copd, recurrent pneumonia            Hematologic: The patient reports no easy bruising, frequent gum bleeding or nose bleeding;  The patient has no history of known chronic anemia            Dermatologic: The patient reports no recent rashes or chronic skin disorders            Rheumatologic: The patient reports no history of chronic arthritis, myalgias, arthralgias            Genitourinary:  The patient reports no history of recurrent urinary tract infections, hematuria, dysuria, or nephrolithiasis           Psychiatric: + history of depression, anxiety           Oncologic: The patient reports no history of prior solid tumor or hematologic  malignancy           ENT: The patient reports no hoarseness of voice, hearing loss, sinus congestion, tinnitus           Neurologic: The patient reports no history of seizure, stroke, or frequent headaches  PE: BP 93/60 (BP Location: Right arm)   Pulse 68   Temp 98.7 °F (37.1 °C) (Oral)   Resp 16   Ht 5' 5\" (1.651 m)   Wt 143 lb 8 oz (65.1 kg)   LMP 10/01/2024   SpO2 95%   BMI 23.88 kg/m²   Gen: AAO x 3, able to speak in complete sentences  HENT: EOMI, PERRL, oropharynx is clear with moist mucosal membranes  Eyes: Sclerae are anicteric  Neck:  Supple without nuchal rigidity  CV: Regular rate and rhythm, with normal S1 and S2  Resp: Clear to auscultation bilaterally without wheezes; rubs, rhonchi, or rales  Abdomen: Soft, LLQ tenderness, non-distended with the presence of bowel sounds; No hepatosplenomegaly; no rebound or guarding; No ascites is clinically apparent; no tympany to percussion  Rectum: Seton in place with suture--no drainage noted  Ext: No peripheral edema or cyanosis  Skin: Warm and dry  Psychiatric: Appropriate mood and congruent affect without obvious depression or anxiety  Labs:   Lab Results   Component Value Date    WBC 4.8 04/09/2025    HGB 10.1 04/09/2025    HCT 30.2 04/09/2025    .0 04/09/2025    CREATSERUM 0.54 04/09/2025    BUN 8 04/09/2025     04/09/2025    K 3.6 04/09/2025     04/09/2025    CO2 26.0 04/09/2025     04/09/2025    CA 8.9 04/09/2025    ALB 4.4 04/08/2025    ALKPHO 118 04/08/2025    BILT <0.2 04/08/2025    AST 30 04/08/2025    ALT 15 04/08/2025     Recent Labs   Lab 04/08/25  2040 04/09/25  0523   * 178*   BUN 11 8*   CREATSERUM 0.85 0.54*   CA 9.4 8.9   * 139   K 4.8 3.6    107   CO2 21.0 26.0     Recent Labs   Lab 04/09/25  0523   RBC 3.33*   HGB 10.1*   HCT 30.2*   MCV 90.7   MCH 30.3   MCHC 33.4   RDW 15.3   NEPRELIM 2.27   WBC 4.8   .0       Recent Labs   Lab 04/08/25  2040   ALT 15   AST 30       Imaging:    PROCEDURE:  CT ABDOMEN+PELVIS (CONTRAST ONLY) (CPT=74177)     COMPARISON:  [facility] , CT, CT ABDOMEN+PELVIS(CONTRAST ONLY)(CPT=74177), 3/26/2025, 4:06 AM.     INDICATIONS:  LLQ and perirectal pain/drainage status post seton placement 4/3     TECHNIQUE:  CT scanning was performed from the dome of the diaphragm to the pubic symphysis with non-ionic intravenous contrast material. Post contrast coronal MPR imaging was performed.  Dose reduction techniques were used. Dose information is  transmitted to the ACR (American College of Radiology) NRDR (National Radiology Data Registry) which includes the Dose Index Registry.     PATIENT STATED HISTORY:(As transcribed by Technologist)  left lower and hiwot rectal pain.  drainage status post seton placement 4/3.       CONTRAST USED:  75cc of Isovue 370     FINDINGS:  LUNG BASE:  Minimal atelectasis/scarring  LIVER:  Unremarkable.  BILIARY:  Unremarkable.  SPLEEN:  Unremarkable.  PANCREAS:  Unremarkable.  ADRENALS:  Unremarkable.  KIDNEYS:  Subcentimeter hypodensities in the kidneys measuring up to 9 mm are too small to further characterize and warrant no specific follow-up.  Hypodense lesions with higher than fluid attenuation in the left kidney measuring up to 2.1 cm in the mid  left kidney may represent a proteinaceous cysts, with solid renal mass not entirely excluded.  This could be further assessed with a dedicated renal ultrasound as clinically directed.  Mild cortical scarring in the upper pole right kidney.  AORTA/VASCULAR:  Unremarkable.  RETROPERITONEUM:  Unremarkable.  BOWEL/MESENTERY:  Unremarkable appendix.  Moderate feces in the colon.  No large or small bowel dilatation.  No free air or significant free fluid.  There is a possible stent or suture noted in the region of the anal rectal junction.  No drainable fluid  collection is identified in this area.  Clinical correlation and direct inspection is suggested.    ABDOMINAL WALL:  Navel piercing noted.  PELVIC  ORGANS:  Unremarkable urinary bladder.  IUD in the uterus.  Unremarkable ovaries.  LYMPH NODES:  No lymphadenopathy in the abdomen or pelvis.  BONES:  Unremarkable.  OTHER:  None.      Impression   CONCLUSION:       1. There is a possible stent or suture noted in the region of the anal rectal junction.  No drainable fluid collection is identified in this area.  Clinical correlation and direct inspection is suggested.     2. Hypodense lesions with higher than fluid attenuation in the left kidney measuring up to 2.1 cm in the mid left kidney may represent a proteinaceous cysts, with solid renal mass not entirely excluded.  This could be further assessed with a dedicated  renal ultrasound as clinically directed.     Please see above for further details.     LOCATION:  [facility]        Dictated by (CST): Chan Delatorre MD on 4/08/2025 at 10:14 PM      Finalized by (CST): Chan Delatorre MD on 4/08/2025 at 10:18 PM      Impression:  40 yr-old female with hx of uncontrolled DM (A1C 9.7) and Crohn's disease of the colon (dx 8 yrs ago; follows with her established general surgeon at [facility] who prescribes Remicade 7.5 mg/q 12 wks--last infusion 4 weeks ago) who carries a hx of hiwot-anal disease r/t hidradenitis suppurativa and more recently fistulas requiring Seton placement (reports 70+ surgeries at [facility]) admitted yesterday evening with acute on chronic rectal pain and rectal discharge following anorectal exam under anesthesia 4/3 at [facility] for chronic anorectal pain which revealed a left posterolateral intersphincteric fistula s/p seton placement of tract + perianal + pudendal block was administered for pain control.  CT A/P IV suggests moderate feces in the colon, no large or small bowel dilation, known seton in the anal rectal junction with no drainable fluid collection in this area; labs normal WBC, stable anemia (Hgb 10-11), marked hyperglycemia (glucose > 500 on arrival), normal kidney function,  normal albumin  Will rule out acute infection, check inflammatory markers, and continue supportive care measures while starting laxatives for stool burden.  Will consult colorectal surgery given history of fistulas and continue supportive care measures.  Currently no role for IV steroids given no definitive IBD flare    Recommendations:     Stool for C. difficile, GI PCR panel, and fecal calprotectin; enteric isolation  MiraLAX 17 g p.o. twice daily to improve stool burden  CRP to blood in lab  No plan for steroids at this time given no inflammation noted on CT and markedly elevated blood sugars--will await CRP and fecal calprotectin  Clear liquid diet trial encouraging smaller more frequent amounts  Pain management per hospitalist recommendations limiting narcotics as able  Can consider outpatient gastric emptying scan with established GI if nausea persists; continue to optimize glucose control  Colorectal surgery consult given history of fistulas and current seton  Monitor for overt GI bleeding--no need for occult stool testing  CBC in a.m. transfusing as needed to maintain Hgb greater than 7    Thank you for the consultation, we will follow the patient with you.  Attending addendum (Dr PHILLIP Cabrera) to follow later today and provide formal, final recommendations at that time   CARLOS A Ghosh  10:46 AM  4/9/2025  [facility]  [phone]    GI attending addendum:    I have personally seen and examined this patient and agree with above nurse practitioner's assessment and recommendations.     Briefly, patient is a 40-year-old female with chronic medical conditions including uncontrolled diabetes, and Crohn's disease of the colon with extensive history as above presented to the ER yesterday with acute on chronic rectal pain and rectal discharge.  She has had recent extensive surgical history as described above.  She notes having occasional watery stools but also notes some occasional issues with  constipation as well.  She notes she typically eats a high-fiber diet.  On physical exam, patient was resting comfortably in bed.  Her abdomen is soft, nontender, and nondistended.  Patient with Crohn's disease with significant perianal and rectal pathology as described above.  She had a recent seton placement.  CT without abscess or fluid collection.  At this time, would recommend avoiding any steroids given normal CRP and significant elevated blood glucose and uncontrolled diabetes as to avoid significant hyperglycemia.  General surgeon has been consulted and appreciate their recommendations.  Recommend MiraLAX as above.  Pain control per primary.  If symptoms persist can consider flexible sigmoidoscopy, however this would ideally be performed in the outpatient setting with her colorectal surgeon at [facility].  Additional recommendations above.  Thank you for the consultation.  Will continue to follow along with you.    Johnny Cabrera,   6:06 PM  4/9/2025  [facility]  [phone]         [1]   Past Medical History:   Crohn's disease (HCC)    Type 1 diabetes mellitus (HCC)   [2]   Past Surgical History:  Procedure Laterality Date    I&d deep absc neck/chst+rib cut      Other surgical history  04/02/2025    [facility]. rectal fistula repair   [3]    [COMPLETED] glucose-vitamin C (Dex-4) 4-6 g-mg chewable tab        escitalopram (Lexapro) tab 20 mg  20 mg Oral Daily    glucose (Dex4) 15 GM/59ML oral liquid 15 g  15 g Oral Q15 Min PRN    Or    glucose (Glutose) 40% oral gel 15 g  15 g Oral Q15 Min PRN    Or    glucose-vitamin C (Dex-4) chewable tab 4 tablet  4 tablet Oral Q15 Min PRN    Or    dextrose 50% injection 50 mL  50 mL Intravenous Q15 Min PRN    Or    glucose (Dex4) 15 GM/59ML oral liquid 30 g  30 g Oral Q15 Min PRN    Or    glucose (Glutose) 40% oral gel 30 g  30 g Oral Q15 Min PRN    Or    glucose-vitamin C (Dex-4) chewable tab 8 tablet  8 tablet Oral Q15 Min PRN     acetaminophen (Tylenol) tab 650 mg  650 mg Oral Q4H PRN    Or    HYDROcodone-acetaminophen (Norco) 5-325 MG per tab 1 tablet  1 tablet Oral Q4H PRN    Or    HYDROcodone-acetaminophen (Norco) 5-325 MG per tab 2 tablet  2 tablet Oral Q4H PRN    melatonin tab 3 mg  3 mg Oral Nightly PRN    ondansetron (Zofran) 4 MG/2ML injection 4 mg  4 mg Intravenous Q6H PRN    prochlorperazine (Compazine) 10 MG/2ML injection 5 mg  5 mg Intravenous Q8H PRN    insulin degludec (Tresiba) 100 units/mL flextouch 10 Units  10 Units Subcutaneous Daily    insulin aspart (NovoLOG) 100 Units/mL FlexPen 1-68 Units  1-68 Units Subcutaneous TID CC    insulin aspart (NovoLOG) 100 Units/mL FlexPen 1-10 Units  1-10 Units Subcutaneous TID AC and HS    metroNIDAZOLE in sodium chloride 0.79% (Flagyl) 5 mg/mL IVPB premix 500 mg  500 mg Intravenous Q12H    [COMPLETED] HYDROmorphone (Dilaudid) 1 MG/ML injection 1 mg  1 mg Intravenous Once    dextrose 5%-sodium chloride 0.9% infusion   Intravenous Continuous    HYDROmorphone (Dilaudid) 1 MG/ML injection 0.5 mg  0.5 mg Intravenous Q2H PRN    Or    HYDROmorphone (Dilaudid) 1 MG/ML injection 1 mg  1 mg Intravenous Q2H PRN    Or    HYDROmorphone (Dilaudid) 1 MG/ML injection 2 mg  2 mg Intravenous Q2H PRN    [COMPLETED] sodium chloride 0.9 % IV bolus 1,000 mL  1,000 mL Intravenous Once    ondansetron (Zofran) 4 MG/2ML injection 4 mg  4 mg Intravenous Once    [COMPLETED] HYDROmorphone (Dilaudid) 1 MG/ML injection 1 mg  1 mg Intravenous Once    [COMPLETED] insulin aspart (NovoLOG) 100 Units/mL vial 13 Units  0.2 Units/kg Subcutaneous Once    [COMPLETED] iopamidol 76% (ISOVUE-370) injection for power injector  75 mL Intravenous ONCE PRN    [COMPLETED] HYDROmorphone (Dilaudid) 1 MG/ML injection 1 mg  1 mg Intravenous Once    [COMPLETED] HYDROmorphone (Dilaudid) 1 MG/ML injection 1 mg  1 mg Intravenous Once    [COMPLETED] metroNIDAZOLE in sodium chloride 0.79% (Flagyl) 5 mg/mL IVPB premix 500 mg  500 mg Intravenous  Once    [COMPLETED] glucose (Glutose) 40% oral gel       [4]   Allergies  Allergen Reactions    Vancomycin ANAPHYLAXIS    Morphine RASH    Toradol [Ketorolac Tromethamine] RASH   [5]   Social History  Socioeconomic History    Marital status:    Tobacco Use    Smoking status: Never    Smokeless tobacco: Never   Vaping Use    Vaping status: Never Used   Substance and Sexual Activity    Alcohol use: Not Currently    Drug use: Never     Social Drivers of Health     Food Insecurity: No Food Insecurity (3/26/2025)    NCSS - Food Insecurity     Worried About Running Out of Food in the Last Year: No     Ran Out of Food in the Last Year: No   Transportation Needs: No Transportation Needs (3/26/2025)    NCSS - Transportation     Lack of Transportation: No   Housing Stability: Not At Risk (3/26/2025)    NCSS - Housing/Utilities     Has Housing: Yes     Worried About Losing Housing: No     Unable to Get Utilities: No

## 2025-04-09 NOTE — PROGRESS NOTES
NURSING ADMISSION NOTE      Patient admitted via Cart  Oriented to room.  Safety precautions initiated.  Bed in low position.  Call light in reach.    Alert. VSS. Hypoglycemic. Protocol started. IV abx. Pain meds.

## 2025-04-09 NOTE — ED QUICK NOTES
After 2nd dose of HYDROmorphone 1mg, pt asked if she could have it scheduled as PRN every 30 minutes like \"last time she was here\". Let her know I would make the doctor aware of request.

## 2025-04-09 NOTE — ED PROVIDER NOTES
Patient Seen in: [facility] Emergency Department      History     Chief Complaint   Patient presents with    Hyperglycemia     Stated Complaint: DM1, BG>500, recent surgery at [facility]    Subjective:   HPI      40-year-old female with a past medical history as below including DM1 and Crohn's disease status post seton placement for perianal fistula/abscess 5 days ago presents with increased rectal pain and drainage today along with Crohn's flare and elevated blood sugars.  Patient states she had the seton surgery done at [facility] and was doing well afterwards up until this morning when pain became severe and she noted purulent and malodor and drainage.  She also reports feeling like she is having a Crohn's flare with pain in her left lower quadrant and bloody diarrhea x 4.  She reports 2 episodes of NBNB emesis.  Reports feeling feverish and weak.  She states her blood sugar was over 500 and she gave herself correction doses of insulin with improvement but blood sugar would go up again.    Objective:     Past Medical History:    Crohn's disease (HCC)    Type 1 diabetes mellitus (HCC)              Past Surgical History:   Procedure Laterality Date    I&d deep absc neck/chst+rib cut      Other surgical history  04/02/2025    [facility]. rectal fistula repair                Social History     Socioeconomic History    Marital status:    Tobacco Use    Smoking status: Never    Smokeless tobacco: Never   Vaping Use    Vaping status: Never Used   Substance and Sexual Activity    Alcohol use: Not Currently    Drug use: Never     Social Drivers of Health     Food Insecurity: No Food Insecurity (3/26/2025)    NCSS - Food Insecurity     Worried About Running Out of Food in the Last Year: No     Ran Out of Food in the Last Year: No   Transportation Needs: No Transportation Needs (3/26/2025)    NCSS - Transportation     Lack of Transportation: No   Housing Stability: Not At Risk (3/26/2025)     NCSS - Housing/Utilities     Has Housing: Yes     Worried About Losing Housing: No     Unable to Get Utilities: No                  Physical Exam     ED Triage Vitals [04/08/25 1938]   /80   Pulse 102   Resp 16   Temp 97.6 °F (36.4 °C)   Temp src Temporal   SpO2 98 %   O2 Device None (Room air)       Current Vitals:   Vital Signs  BP: 111/65  Pulse: 86  Resp: 16  Temp: 97.6 °F (36.4 °C)  Temp src: Temporal  MAP (mmHg): 80    Oxygen Therapy  SpO2: 100 %  O2 Device: None (Room air)        Physical Exam  Vitals and nursing note reviewed.   Constitutional:       Appearance: She is well-developed.   HENT:      Head: Normocephalic and atraumatic.      Mouth/Throat:      Mouth: Mucous membranes are moist.   Eyes:      General: No scleral icterus.  Cardiovascular:      Rate and Rhythm: Normal rate and regular rhythm.   Pulmonary:      Effort: Pulmonary effort is normal.      Breath sounds: Normal breath sounds.   Abdominal:      General: Bowel sounds are normal. There is no distension.      Palpations: Abdomen is soft.      Tenderness: There is abdominal tenderness. There is no guarding or rebound.      Comments: Left lower quadrant tenderness   Genitourinary:     Comments: Perianal area with setons in place diffuse tenderness  No focal erythema or mass noted  Skin:     General: Skin is warm and dry.   Neurological:      General: No focal deficit present.      Mental Status: She is alert and oriented to person, place, and time.      Cranial Nerves: No cranial nerve deficit.      Motor: No weakness.   Psychiatric:         Mood and Affect: Mood normal.         Behavior: Behavior normal.             ED Course     Labs Reviewed   COMP METABOLIC PANEL (14) - Abnormal; Notable for the following components:       Result Value    Glucose 522 (*)     Sodium 132 (*)     Calculated Osmolality 297 (*)     Alkaline Phosphatase 118 (*)     Bilirubin, Total <0.2 (*)     All other components within normal limits   CBC WITH  DIFFERENTIAL WITH PLATELET - Abnormal; Notable for the following components:    RBC 3.67 (*)     HGB 11.4 (*)     HCT 32.6 (*)     All other components within normal limits   VENOUS BLOOD GAS - Abnormal; Notable for the following components:    Venous pH 7.47 (*)     Venous pCO2 31 (*)     Venous pO2 160 (*)     Venous O2Hb 90.7 (*)     All other components within normal limits   URINALYSIS WITH CULTURE REFLEX - Abnormal; Notable for the following components:    Urine Color Colorless (*)     Spec Gravity >1.030 (*)     Glucose Urine >1000 (*)     All other components within normal limits   POCT GLUCOSE - Abnormal; Notable for the following components:    POC Glucose 525 (*)     All other components within normal limits   POCT GLUCOSE - Abnormal; Notable for the following components:    POC Glucose 268 (*)     All other components within normal limits   POCT GLUCOSE - Abnormal; Notable for the following components:    POC Glucose 68 (*)     All other components within normal limits   SED RATE, WESTERGREN (AUTOMATED) - Normal   C-REACTIVE PROTEIN - Normal   SCAN SLIDE   C. DIFFICILE(TOXIGENIC)PCR   GI STOOL PANEL BY PCR            CT ABDOMEN+PELVIS(CONTRAST ONLY)(CPT=74177)    Result Date: 4/8/2025  CONCLUSION:   1. There is a possible stent or suture noted in the region of the anal rectal junction.  No drainable fluid collection is identified in this area.  Clinical correlation and direct inspection is suggested.  2. Hypodense lesions with higher than fluid attenuation in the left kidney measuring up to 2.1 cm in the mid left kidney may represent a proteinaceous cysts, with solid renal mass not entirely excluded.  This could be further assessed with a dedicated renal ultrasound as clinically directed.  Please see above for further details.  LOCATION:  [place]   Dictated by (CST): Chan Delatorre MD on 4/08/2025 at 10:14 PM     Finalized by (CST): Chan Delatorre MD on 4/08/2025 at 10:18 PM             MDM       40-year-old female with a past medical history as below including DM1 and Crohn's disease status post seton placement for perianal fistula/abscess 5 days ago presents with increased rectal pain and drainage today along with Crohn's flare and elevated blood sugars.      Differential includes but is not limited to perianal/rectal abscess, Crohn's flare, uncontrolled DM1, DKA, dehydration, electrolyte abnormality    Labs show hyperglycemia with blood sugar 522 without evidence to DKA with normal pH, anion gap and bicarb.  WBC is normal at 6.0.  Patient has chronic anemia with hemoglobin 11.4, improved from previous 10.5 on 3/26/2025.    Independent interpretation of CT abdomen/pelvis shows no perianal perirectal abscess.  Radiology report reviewed as above noting no drainable fluid collection.    Patient treated with IV fluids and subcu insulin with improvement in blood sugar to 268.    Patient reassessed and continues to complain of significant rectal pain despite treatment with several doses of Dilaudid.  Requesting admission for pain control.  Discussed with hospitalist Dr. Walker.  Discussed with GI Dr. Burgess recommends continuing Flagyl.    Admission disposition: 4/8/2025 11:07 PM             Medical Decision Making  Amount and/or Complexity of Data Reviewed  External Data Reviewed: labs.     Details: See MDM  Labs: ordered. Decision-making details documented in ED Course.  Radiology: ordered and independent interpretation performed. Decision-making details documented in ED Course.  Discussion of management or test interpretation with external provider(s): Hospitalist, GI    Risk  Parenteral controlled substances.  Decision regarding hospitalization.        Disposition and Plan     Clinical Impression:  1. Uncontrolled type 1 diabetes mellitus with hypoglycemia without coma (HCC)    2. Rectal pain    3. Exacerbation of Crohn's disease of large intestine (HCC)         Disposition:  Admit  4/8/2025 11:07  pm    Follow-up:  No follow-up provider specified.        Medications Prescribed:  Current Discharge Medication List              Supplementary Documentation:         Hospital Problems       Present on Admission             ICD-10-CM Noted POA    * (Principal) Uncontrolled type 1 diabetes mellitus with hypoglycemia without coma (HCC) E10.649 4/8/2025 Unknown    Exacerbation of Crohn's disease of large intestine (HCC) K50.10 4/8/2025 Unknown    Rectal pain K62.89 4/8/2025 Unknown

## 2025-04-09 NOTE — ED QUICK NOTES
Patient given 3rd dose of IV dilaudid, aware hospital ist will come down and talk to pt about further pain management.

## 2025-04-09 NOTE — CONSULTS
[facility]  Report of Consultation    Esperanza Mauro Patient Status:  Inpatient    1985 MRN [id number]   Location [facility] 4NW-A Attending Wilson Narayan,    Hosp Day # 1 PCP None Pcp     Requesting Physician:  CARLOS A Ghosh    Reason for Consultation:  Rectal pain, recurrent anal fistulas currently with a seton in place    Chief Complaint:  Rectal pain and concern for a Crohn's flare    History of Present Illness:  Esperanza Mauro is a 40 year old female who presents to [facility] on 2025 with complaints of severe rectal pain and an ongoing Crohn's flare.    The patient underwent an anal exam under anesthesia, identification of left posterior lateral intersphincteric fistula tract, with placement of vessel loop seton with Dr. Coelho at [facility] on 4/3/2025.    The patient states the first 3 to 4 days following her operation she did very well with minimal rectal pain.  The patient states yesterday morning, she woke up with severe rectal pain.  The pain is primarily on the left side.  She denies any enticing events.    The patient states she has had somewhat chronic rectal pain over the last 3 to 4 months.  She states this has been associated with her recurrent Crohn's flares.    The patient has a history of multiple anal fistulas.  She states they have been managed with fistulotomy, seton placement and Botox injections.  She states she began having issues with anal fistulas approximately 5 years ago.    Additionally, over the past 3 to 4 months, the patient states she has been having more frequent Crohn's flares.  She states she has a flare every week or every other week.  Her Crohn's flares are associated with left lower quadrant abdominal pain, diarrhea, mucus/pus in her stool and bright red blood in her stool.    She currently follows with a gastroenterologist at the [facility].  She states she currently receives Remicade infusions every 12 weeks.  Her  most recent infusion was approximately 2 weeks ago.    The patient states she is up-to-date with colonoscopy.  She states she had a colonoscopy last July, 2024 at [facility].  I am unable to find these records.    Upon presentation to the hospital, CT scan of the abdomen and pelvis revealed a possible stent or suture noted in the region of the anorectal junction.  No drainable fluid collection is identified.  Unremarkable appendix.  Moderate feces in the colon.  No large or small bowel dilation.  No free air or significant free fluid.  All other significant findings may be seen in the radiologist report.    The patient is afebrile.  WBCs 4.8.  Hemoglobin 10.1.  Platelets 281,000.  Hyperglycemic (178).  Electrolytes are within normal limits.  CRP is within normal limits.    Past medical history significant for Crohn's disease and type 1 diabetes mellitus.    The patient has undergone numerous operations for her perianal fistulas, but has never had an abdominal operation.    History:  Past Medical History[1]  Past Surgical History[2]  Family History[3]   reports that she has never smoked. She has never used smokeless tobacco. She reports that she does not currently use alcohol. She reports that she does not use drugs.    Allergies:  Allergies[4]    Medications:  Prescriptions Prior to Admission[5]    Current Hospital Medications[6]    Review of Systems:  Review of Systems   Constitutional:  Negative for chills and fever.   Gastrointestinal:  Positive for abdominal pain, diarrhea, blood in stool and rectal pain.   All other systems reviewed and are negative.      Physical Exam:  /52 (BP Location: Right arm)   Pulse 68   Temp 98.5 °F (36.9 °C) (Oral)   Resp 16   Ht 65\"   Wt 143 lb 8 oz (65.1 kg)   LMP 10/01/2024   SpO2 95%   BMI 23.88 kg/m²   Physical Exam  Vitals and nursing note reviewed. Exam conducted with a chaperone present.   Constitutional:       Appearance: Normal appearance. She is normal  weight.   HENT:      Head: Normocephalic and atraumatic.      Right Ear: External ear normal.      Left Ear: External ear normal.      Nose: Nose normal.   Eyes:      General: No scleral icterus.     Conjunctiva/sclera: Conjunctivae normal.   Pulmonary:      Effort: Pulmonary effort is normal. No respiratory distress.   Abdominal:      General: Abdomen is flat. There is no distension.      Palpations: Abdomen is soft. There is no mass.      Tenderness: There is abdominal tenderness in the right lower quadrant.      Hernia: No hernia is present.   Genitourinary:     Comments: Significant perianal scarring from prior fistulas.  There is a yellow vessel loop seton in place in the left lateral location.  Exam is extremely limited due to the patient's rectal pain.  Musculoskeletal:      Cervical back: Normal range of motion.   Skin:     General: Skin is warm and dry.   Neurological:      Mental Status: She is alert.   Psychiatric:         Mood and Affect: Mood normal.         Behavior: Behavior normal.         Judgment: Judgment normal.       Laboratory Data:  Recent Labs   Lab 04/08/25 2034 04/09/25  0523   RBC 3.67* 3.33*   HGB 11.4* 10.1*   HCT 32.6* 30.2*   MCV 88.8 90.7   MCH 31.1 30.3   MCHC 35.0 33.4   RDW 15.2 15.3   NEPRELIM 3.80 2.27   WBC 6.0 4.8   .0 281.0       Recent Labs   Lab 04/08/25 2040 04/09/25  0523   * 178*   BUN 11 8*   CREATSERUM 0.85 0.54*   CA 9.4 8.9   ALB 4.4  --    * 139   K 4.8 3.6    107   CO2 21.0 26.0   ALKPHO 118*  --    AST 30  --    ALT 15  --    BILT <0.2*  --    TP 6.8  --          No results for input(s): \"PTP\", \"INR\", \"PTT\" in the last 168 hours.      CT ABDOMEN+PELVIS(CONTRAST ONLY)(CPT=74177)  Result Date: 4/8/2025  CONCLUSION:   1. There is a possible stent or suture noted in the region of the anal rectal junction.  No drainable fluid collection is identified in this area.  Clinical correlation and direct inspection is suggested.  2. Hypodense lesions  with higher than fluid attenuation in the left kidney measuring up to 2.1 cm in the mid left kidney may represent a proteinaceous cysts, with solid renal mass not entirely excluded.  This could be further assessed with a dedicated renal ultrasound as clinically directed.  Please see above for further details.  LOCATION:  [place]   Dictated by (CST): Chan Delatorre MD on 4/08/2025 at 10:14 PM     Finalized by (CST): Chan Delatorre MD on 4/08/2025 at 10:18 PM             Medical Decision Making         Impression:  Problem List[7]    Crohn's disease  Anal fistula with seton in place   POD 5 anal exam under anesthesia with placement of seton  Rectal pain    Plan:  The patient's plan was discussed with Dr. Hong.  No plan for acute general surgical intervention at this time  Plan for symptomatic management of the patient's rectal pain  Will ultimately recommend returning to her original surgeon for further workup given her complex history with perianal fistulas  Appreciate GI recommendations regarding management of her Crohn's disease  Antiemetics and analgesics as needed  Activity as tolerated  DVT prophylaxis with SCDs-okay for anticoagulation from a general surgical standpoint    Leticia Phelps PA-C  4/9/2025  4:56 PM    Is this a shared or split note between Advanced Practice Provider and Physician? Yes        The patient was independently interviewed and examined by myself.  More than 50% of clinical time and 100% MDM was performed by myself.   I personally performed the services described in this documentation and I agree with the assessment and plan as set forth above and discussed with the physician assistant.       40-year-old female who presents to the emergency department with severe rectal pain.  Esperanza does have a extensive medical history including type 1 diabetes and severe Crohn's disease.  The patient's Crohn's disease has been complicated by extensive perianal disease including fissures  and fistulas which have required multiple procedures.  The patient's Crohn's disease was diagnosed 8 years ago and she does follow-up with a general surgeon at [facility] who has prescribed Remicade.    The patient was recently seen at [facility] by Dr. Coelho who performed an examination under anesthesia, identification of the intersphincteric fistulous track and placement of a seton.    Esperanza reports that for 2 days after surgery she was feeling well but subsequently developed severe rectal pain.  The pain was quite severe and the patient presented to the emergency department for further management.  Workup in the ED revealed normal WBC  , Hemoglobin was slightly diminished at 10.1, this morning 10.6.  CMP essentially normal except alkaline phosphatase mildly elevated 118.  Patient's initial glucose was 522 this morning 179.  CT scan of the abdomen and pelvis was performed this reveals a 2 cm left renal lesion which may represent a solid mass versus a proteinaceous cyst.  Renal ultrasound should be ordered.  No perianal abscesses noted.    Physical examination is limited by the patient's discomfort.  On external examination a seton is noted in the left lateral quadrant.  There is no blood or purulent fluid noted.  Extensive perianal scarring is noted from multiple prior surgical procedures.    Treatment options were reviewed in detail with Esperanza.  We did discuss that due to the complexity of her anorectal disease, colorectal surgery would be consulted.  We discussed the option of having the [facility] colorectal surgeon Dr. Gibson see her versus the patient may return to [facility] to be seen by the surgeon who operated on her 6 days ago.  We also discussed that as she was seeing a gastroenterologist at the [facility], if she wanted to establish care with a colorectal surgeon at the [facility] this would also be possible.  Esperanza reports that she was not  satisfied with her care at the [facility] and therefore will not return to that institution.  She is concerned that he may not be able to tolerate the 6-hour drive back to the [facility] and her current condition.  Therefore, she would be amenable to seeing Dr. Gibson at [facility].  No indication for emergent surgical intervention today.  Case will be discussed with Dr. Gibson.  Esperanza is comfortable with this treatment plan.  She has no further questions at this time.      GISELLE Hong MD, FACS    Please note that this report has been produced using speech recognition software and may contain errors related to that system including but not limited to errors in grammar, punctuation and spelling as well as words and phrases that possibly may have been recognized inappropriately.  If there are any questions or concerns please contact the dictating provider for clarification.  The 21st Century Cures Act makes medical notes like these available to patients in the interest of trans parency. Please be advised this is a medical document. Medical documents are intended to carry relevant information, facts as evident, and the clinical opinion of the practitioner. The medical note is intended as peer to peer communication and may appear blunt or direct. It is written in medical language and may contain abbreviations or verbiage that are unfamiliar.  If there are any questions or concerns please contact the dictating provider for clarification.                                   [1]   Past Medical History:   Crohn's disease (HCC)    Type 1 diabetes mellitus (HCC)   [2]   Past Surgical History:  Procedure Laterality Date    I&d deep absc neck/chst+rib cut      Other surgical history  04/02/2025    [facility]. rectal fistula repair   [3] History reviewed. No pertinent family history.  [4]   Allergies  Allergen Reactions    Vancomycin ANAPHYLAXIS    Morphine RASH    Toradol [Ketorolac Tromethamine] RASH    [5]   Medications Prior to Admission   Medication Sig    HYDROmorphone 4 MG Oral Tab Take 1 tablet (4 mg total) by mouth every 4 (four) hours as needed for Pain.    escitalopram 20 MG Oral Tab Take 1 tablet (20 mg total) by mouth daily.    inFLIXimab (REMICADE IV)     TRESIBA FLEXTOUCH 100 UNIT/ML Subcutaneous Solution Pen-injector Inject 40 Units into the skin daily. Prime 2 units before each dose (Patient taking differently: Inject 15 Units into the skin 2 (two) times daily. Prime 2 units before each dose)    HUMALOG KWIKPEN 100 UNIT/ML Subcutaneous Solution Pen-injector Inject 8-14 Units into the skin 3 (three) times daily with meals. Plus sliding scale. Carb ratio 1-6 grams. Correction 1-30. Total 50 units daily. (Patient taking differently: Inject 3-6 Units into the skin 3 (three) times daily with meals. Per sliding scale)    Amphetamine-Dextroamphet ER 20 MG Oral Capsule SR 24 Hr Take 1 capsule (20 mg total) by mouth every morning. (Patient taking differently: Take 1 capsule (20 mg total) by mouth daily as needed.)    amphetamine-dextroamphetamine 10 MG Oral Tab Take 1 tablet (10 mg total) by mouth daily. (Patient taking differently: Take 1 tablet (10 mg total) by mouth daily as needed.)    PROMETHEGAN 25 MG Rectal Suppos Place 1 suppository (25 mg total) rectally every 6 (six) hours as needed.   [6]   Current Facility-Administered Medications:     escitalopram (Lexapro) tab 20 mg, 20 mg, Oral, Daily    glucose (Dex4) 15 GM/59ML oral liquid 15 g, 15 g, Oral, Q15 Min PRN **OR** glucose (Glutose) 40% oral gel 15 g, 15 g, Oral, Q15 Min PRN **OR** glucose-vitamin C (Dex-4) chewable tab 4 tablet, 4 tablet, Oral, Q15 Min PRN **OR** dextrose 50% injection 50 mL, 50 mL, Intravenous, Q15 Min PRN **OR** glucose (Dex4) 15 GM/59ML oral liquid 30 g, 30 g, Oral, Q15 Min PRN **OR** glucose (Glutose) 40% oral gel 30 g, 30 g, Oral, Q15 Min PRN **OR** glucose-vitamin C (Dex-4) chewable tab 8 tablet, 8 tablet, Oral, Q15 Min  PRN    acetaminophen (Tylenol) tab 650 mg, 650 mg, Oral, Q4H PRN **OR** HYDROcodone-acetaminophen (Norco) 5-325 MG per tab 1 tablet, 1 tablet, Oral, Q4H PRN **OR** HYDROcodone-acetaminophen (Norco) 5-325 MG per tab 2 tablet, 2 tablet, Oral, Q4H PRN    melatonin tab 3 mg, 3 mg, Oral, Nightly PRN    ondansetron (Zofran) 4 MG/2ML injection 4 mg, 4 mg, Intravenous, Q6H PRN    prochlorperazine (Compazine) 10 MG/2ML injection 5 mg, 5 mg, Intravenous, Q8H PRN    insulin degludec (Tresiba) 100 units/mL flextouch 10 Units, 10 Units, Subcutaneous, Daily    insulin aspart (NovoLOG) 100 Units/mL FlexPen 1-68 Units, 1-68 Units, Subcutaneous, TID CC    insulin aspart (NovoLOG) 100 Units/mL FlexPen 1-10 Units, 1-10 Units, Subcutaneous, TID AC and HS    metroNIDAZOLE in sodium chloride 0.79% (Flagyl) 5 mg/mL IVPB premix 500 mg, 500 mg, Intravenous, Q12H    dextrose 5%-sodium chloride 0.9% infusion, , Intravenous, Continuous    HYDROmorphone (Dilaudid) 1 MG/ML injection 0.5 mg, 0.5 mg, Intravenous, Q2H PRN **OR** HYDROmorphone (Dilaudid) 1 MG/ML injection 1 mg, 1 mg, Intravenous, Q2H PRN **OR** HYDROmorphone (Dilaudid) 1 MG/ML injection 2 mg, 2 mg, Intravenous, Q2H PRN    ondansetron (Zofran) 4 MG/2ML injection 4 mg, 4 mg, Intravenous, Once  [7]   Patient Active Problem List  Diagnosis    Hyponatremia    Hyperglycemia    Anemia    Crohn's disease without complication, unspecified gastrointestinal tract location (HCC)    Type 1 diabetes mellitus with hyperglycemia (HCC)    Uncontrolled type 1 diabetes mellitus with hypoglycemia without coma (HCC)    Rectal pain    Exacerbation of Crohn's disease of large intestine (HCC)

## 2025-04-10 ENCOUNTER — ANESTHESIA EVENT (OUTPATIENT)
Dept: SURGERY | Facility: HOSPITAL | Age: 40
End: 2025-04-10
Payer: COMMERCIAL

## 2025-04-10 PROBLEM — K60.423: Status: ACTIVE | Noted: 2025-04-10

## 2025-04-10 LAB
ADENOVIRUS F 40/41 PCR: NEGATIVE
ASTROVIRUS PCR: NEGATIVE
BASOPHILS # BLD AUTO: 0.03 X10(3) UL (ref 0–0.2)
BASOPHILS NFR BLD AUTO: 0.6 %
C CAYETANENSIS DNA SPEC QL NAA+PROBE: NEGATIVE
CAMPY SP DNA.DIARRHEA STL QL NAA+PROBE: NEGATIVE
CRYPTOSP DNA SPEC QL NAA+PROBE: NEGATIVE
EAEC PAA PLAS AGGR+AATA ST NAA+NON-PRB: NEGATIVE
EC STX1+STX2 + H7 FLIC SPEC NAA+PROBE: NEGATIVE
ENTAMOEBA HISTOLYTICA PCR: NEGATIVE
EOSINOPHIL # BLD AUTO: 0.04 X10(3) UL (ref 0–0.7)
EOSINOPHIL NFR BLD AUTO: 0.8 %
EPEC EAE GENE STL QL NAA+NON-PROBE: NEGATIVE
ERYTHROCYTE [DISTWIDTH] IN BLOOD BY AUTOMATED COUNT: 15.2 %
ETEC LTA+ST1A+ST1B TOX ST NAA+NON-PROBE: NEGATIVE
GIARDIA LAMBLIA PCR: NEGATIVE
GLUCOSE BLD-MCNC: 169 MG/DL (ref 70–99)
GLUCOSE BLD-MCNC: 231 MG/DL (ref 70–99)
GLUCOSE BLD-MCNC: 240 MG/DL (ref 70–99)
GLUCOSE BLD-MCNC: 338 MG/DL (ref 70–99)
HCT VFR BLD AUTO: 31.4 % (ref 35–48)
HCT VFR BLD AUTO: 32.5 % (ref 35–48)
HGB BLD-MCNC: 10.6 G/DL (ref 12–16)
HGB BLD-MCNC: 10.7 G/DL (ref 12–16)
IMM GRANULOCYTES # BLD AUTO: 0.01 X10(3) UL (ref 0–1)
IMM GRANULOCYTES NFR BLD: 0.2 %
LYMPHOCYTES # BLD AUTO: 1.71 X10(3) UL (ref 1–4)
LYMPHOCYTES NFR BLD AUTO: 32.1 %
MCH RBC QN AUTO: 30.1 PG (ref 26–34)
MCHC RBC AUTO-ENTMCNC: 32.6 G/DL (ref 31–37)
MCV RBC AUTO: 92.3 FL (ref 80–100)
MONOCYTES # BLD AUTO: 0.47 X10(3) UL (ref 0.1–1)
MONOCYTES NFR BLD AUTO: 8.8 %
NEUTROPHILS # BLD AUTO: 3.07 X10 (3) UL (ref 1.5–7.7)
NEUTROPHILS # BLD AUTO: 3.07 X10(3) UL (ref 1.5–7.7)
NEUTROPHILS NFR BLD AUTO: 57.5 %
NOROVIRUS GI/GII PCR: NEGATIVE
P SHIGELLOIDES DNA STL QL NAA+PROBE: NEGATIVE
PLATELET # BLD AUTO: 282 10(3)UL (ref 150–450)
RBC # BLD AUTO: 3.52 X10(6)UL (ref 3.8–5.3)
ROTAVIRUS A PCR: NEGATIVE
SALMONELLA DNA SPEC QL NAA+PROBE: NEGATIVE
SAPOVIRUS PCR: NEGATIVE
SHIGELLA SP+EIEC IPAH ST NAA+NON-PROBE: NEGATIVE
V CHOLERAE DNA SPEC QL NAA+PROBE: NEGATIVE
VIBRIO DNA SPEC NAA+PROBE: NEGATIVE
WBC # BLD AUTO: 5.3 X10(3) UL (ref 4–11)
YERSINIA DNA SPEC NAA+PROBE: NEGATIVE

## 2025-04-10 PROCEDURE — 99233 SBSQ HOSP IP/OBS HIGH 50: CPT | Performed by: HOSPITALIST

## 2025-04-10 RX ORDER — ENOXAPARIN SODIUM 100 MG/ML
40 INJECTION SUBCUTANEOUS DAILY
Status: DISCONTINUED | OUTPATIENT
Start: 2025-04-10 | End: 2025-04-10

## 2025-04-10 RX ORDER — ALPRAZOLAM 0.5 MG
1 TABLET ORAL NIGHTLY PRN
Status: DISCONTINUED | OUTPATIENT
Start: 2025-04-10 | End: 2025-04-12

## 2025-04-10 NOTE — PROGRESS NOTES
[facility]  Progress Note    Esperanza Mauro Patient Status:  Inpatient    1985 MRN [id number]   Location [facility] 4NW-A Attending Wilson Narayan DO   Hosp Day # 2 PCP None Pcp     Subjective:  The patient was seen and examined at bedside.  She reports that she continues to have severe rectal pain.  Her pain is about the same today as it was yesterday.  She reports persistent left lower quadrant abdominal pain, but this is more tolerable.    She reports loose bloody bowel movements.  She reports increased drainage from the fistula.  She states the drainage is malodorous.    Objective/Physical Exam:  BP 97/65 (BP Location: Right arm)   Pulse 76   Temp 98.2 °F (36.8 °C) (Oral)   Resp 16   Ht 65\"   Wt 143 lb 8 oz (65.1 kg)   LMP 10/01/2024   SpO2 93%   BMI 23.88 kg/m²     Intake/Output Summary (Last 24 hours) at 4/10/2025 0953  Last data filed at 4/10/2025 0514  Gross per 24 hour   Intake 1848 ml   Output --   Net 1848 ml         General: Alert, oriented x3. No acute distress.  HEENT: Normocephalic, atraumatic. No scleral icterus.  Pulmonary: No respiratory distress, effort normal.   Abdomen: Non-distended, without tympany to percussion. Soft, left lower quadrant tenderness to palpation. No rebound or guarding. No peritoneal signs.  : Exam was limited to external exam only.  She has a left lateral vessel loop seton in place.  No active drainage at the time of today's exam.  No surrounding erythema or induration  Extremities: No lower extremity edema. No clubbing or cyanosis.   Skin: Warm, dry. No jaundice.       Labs:  Lab Results   Component Value Date    WBC 5.3 04/10/2025    HGB 10.6 04/10/2025    HCT 32.5 04/10/2025    .0 04/10/2025      No results found for: \"PT\", \"INR\"          Assessment:  Problem List[1]    Crohn's disease  Anal fistula with seton in place   POD 6 anal exam under anesthesia with placement of seton  Rectal pain    Plan:  The patient's case was discussed with  Dr. Hong's partner, Dr. Gibson who is a colorectal specialist.  He will plan to take the patient to the operating room for an anal exam under anesthesia tomorrow.  The patient provided the cell phone number of her colorectal surgeon at [facility], Dr. Coy to discuss the patient's case  The patient may have a diet today from a general surgical standpoint  Antiemetics and analgesics as needed  Activity as tolerated  Appreciate GI recommendations  Medical management per primary  DVT prophylaxis with SCDs-hold lovenox due to bloody bowel movements  GI prophylaxis with protoix      The patient was discussed with Dr. Gibson and Dr. Hong , and he/she is in agreement with the assessment and plan. My total face time with this patient was 25 minutes. Greater than half of the visit was spent in counseling the patient on the above listed diagnoses and treatment options.     Leticia Phelps PA-C  4/10/2025  9:53 AM    Patient with ongoing rectal pain, slightly improved today but Esperanza continues to require opioid pain medication for pain relief.  On examination seton is in place.  Minimal serosanguineous drainage on gauze pad however no significant drainage or purulent drainage is noted  WBC within normal limits today, hemoglobin 10.6 and stable, glucose elevated to 31  Case was discussed with Dr. Gibson in his capacity as colorectal surgeon.  He is willing to proceed with examination under anesthesia in an effort to determine the etiology of the patient's severe rectal pain.  Continue GI recommendations for management of Crohn's disease.  Esperanza is comfortable with this treatment plan.  She has no further questions at this time    I agree with the note above and attest to its accuracy with the following changes below after my interview and examination of the patient    The patient was seen and examined.  Available labs and radiology is noted.    Tessa Hong MD FACS    Please note that this  report has been produced using speech recognition software and may contain errors related to that system including but not limited to errors in grammar, punctuation and spelling as well as words and phrases that possibly may have been recognized inappropriately.  If there are any questions or concerns please contact the dictating provider for clarification.    The 21st Century Cures Act makes medical notes like these available to patients in the interest of trans parency. Please be advised this is a medical document. Medical documents are intended to carry relevant information, facts as evident, and the clinical opinion of the practitioner. The medical note is intended as peer to peer communication and may appear blunt or direct. It is written in medical language and may contain abbreviations or verbiage that are unfamiliar.   Again if there are any questions or concerns please contact the dictating provider for clarification.                [1]   Patient Active Problem List  Diagnosis    Hyponatremia    Hyperglycemia    Anemia    Crohn's disease without complication, unspecified gastrointestinal tract location (HCC)    Type 1 diabetes mellitus with hyperglycemia (HCC)    Uncontrolled type 1 diabetes mellitus with hypoglycemia without coma (HCC)    Rectal pain    Exacerbation of Crohn's disease of large intestine (HCC)

## 2025-04-10 NOTE — PROGRESS NOTES
[facility]   part of [facility]     Hospitalist Progress Note     Esperanza Mauro Patient Status:  Inpatient    1985 MRN [id number]   Location [facility] 4NW-A Attending Wilson Narayan,    Hosp Day # 2 PCP None Pcp     Chief Complaint: rectal pain    Subjective:     Patient requiring around the clock iv dilaudid for pain control.   She is telling me that surgery has planned a surgical intervention. Will have to reach out to surgical team.     Objective:    Review of Systems:   A comprehensive review of systems was completed; pertinent positive and negatives stated in subjective.    Vital signs:  Temp:  [97.8 °F (36.6 °C)-99.4 °F (37.4 °C)] 97.8 °F (36.6 °C)  Pulse:  [66-76] 76  Resp:  [16-18] 16  BP: ()/(52-65) 97/65  SpO2:  [93 %-99 %] 93 %    Physical Exam:    General: No acute distress  Respiratory: No wheezes, no rhonchi  Cardiovascular: S1, S2, regular rate and rhythm  Abdomen: Soft, Non-tender, non-distended, positive bowel sounds  Neuro: No new focal deficits.   Extremities: No edema      Diagnostic Data:    Labs:  Recent Labs   Lab 25  0523 25  1604 04/10/25  0149   WBC 6.0 4.8  --   --    HGB 11.4* 10.1* 10.7* 10.7*   MCV 88.8 90.7  --   --    .0 281.0  --   --        Recent Labs   Lab 25  0523   * 178*   BUN 11 8*   CREATSERUM 0.85 0.54*   CA 9.4 8.9   ALB 4.4  --    * 139   K 4.8 3.6    107   CO2 21.0 26.0   ALKPHO 118*  --    AST 30  --    ALT 15  --    BILT <0.2*  --    TP 6.8  --        Estimated Glomerular Filtration Rate: 119 mL/min/1.73m2 (result from lab).    No results for input(s): \"TROP\", \"TROPHS\", \"CK\" in the last 168 hours.    No results for input(s): \"PTP\", \"INR\" in the last 168 hours.               Microbiology    No results found for this visit on 25.      Imaging: Reviewed in Epic.    Medications: Scheduled Medications[1]    Assessment & Plan:      #Rectal pain   #Bloody  diarrhea  #Crohn's disease  -recent seton surgery at [facility]  -GI eval noted  -Gen sx eval noted; discussed w/ Dr Hong  -trend hemoglobin; thus far stable, will back off q8h tests  -Flagyl  -CT negative for perirectal abscess  -pain control w/ q2h prn iv dilaudid   -on remicaide OP     #Hyponatremia, improving   -cont IVF     #DM type I  -A1c 9.7 as of March 2025  -BG up and down given npo status, will cont current regiment for now  -will start hyperglycemia protocol- decrease basal insulin and start in am  -carb counting and SSI      Wilson Narayan, DO    Supplementary Documentation:     Quality:  DVT Mechanical Prophylaxis:   SCDs,    DVT Pharmacologic Prophylaxis   Medication   None                Code Status: Not on file  [place]: No urinary catheter in place  [place] Duration (in days):   Central line:    ANNABEL:     Discharge is dependent on: pain control   At this point Ms. Mauro is expected to be discharge to: tbd    The 21st Century Cures Act makes medical notes like these available to patients in the interest of transparency. Please be advised this is a medical document. Medical documents are intended to carry relevant information, facts as evident, and the clinical opinion of the practitioner. The medical note is intended as peer to peer communication and may appear blunt or direct. It is written in medical language and may contain abbreviations or verbiage that are unfamiliar.                         [1]    escitalopram  20 mg Oral Daily    insulin degludec  10 Units Subcutaneous Daily    insulin aspart  1-68 Units Subcutaneous TID CC    insulin aspart  1-10 Units Subcutaneous TID AC and HS    metroNIDAZOLE  500 mg Intravenous Q12H    polyethylene glycol (PEG 3350)  17 g Oral BID    ondansetron  4 mg Intravenous Once

## 2025-04-10 NOTE — PLAN OF CARE
Pt received A&OX4. VSS. Afebrile. C/o severe pain. PRN given per MAR with mild relief. IVF infusing @83mL/h. All meds per MAR. Insulin correction needing 13 units per orders. MD notified. Tolerating diet. Up ambulating in room. Hard BM. Voiding. NPO @0000 for procedure. Updated on POC. Call light within reach.    1500: pt requesting 1x dose of extra dilaudid after having BM. MD notified-no new orders. PT educated that she can have PRN norco in between. Pt declined.     Problem: PAIN - ADULT  Goal: Verbalizes/displays adequate comfort level or patient's stated pain goal  Description: INTERVENTIONS:- Encourage pt to monitor pain and request assistance- Assess pain using appropriate pain scale- Administer analgesics based on type and severity of pain and evaluate response- Implement non-pharmacological measures as appropriate and evaluate response- Consider cultural and social influences on pain and pain management- Manage/alleviate anxiety- Utilize distraction and/or relaxation techniques- Monitor for opioid side effects- Notify MD/LIP if interventions unsuccessful or patient reports new pain- Anticipate increased pain with activity and pre-medicate as appropriate  Outcome: Progressing     Problem: METABOLIC/FLUID AND ELECTROLYTES - ADULT  Goal: Glucose maintained within prescribed range  Description: INTERVENTIONS:- Monitor Blood Glucose as ordered- Assess for signs and symptoms of hyperglycemia and hypoglycemia- Administer ordered medications to maintain glucose within target range- Assess barriers to adequate nutritional intake and initiate nutrition consult as needed- Instruct patient on self management of diabetes  Outcome: Progressing     Problem: HEMATOLOGIC - ADULT  Goal: Maintains hematologic stability  Description: INTERVENTIONS- Assess for signs and symptoms of bleeding or hemorrhage- Monitor labs and vital signs for trends- Administer supportive blood products/factors, fluids and medications as ordered and  appropriate- Administer supportive blood products/factors as ordered and appropriate  Outcome: Progressing

## 2025-04-10 NOTE — PLAN OF CARE
Patient c/o rectal pain - PRN per MAR. IVF per MAR. Patient tolerated diet. Hyperglycemic - DO Sylvain notified, order received. Patient reports no stool this shift but will call RN if she has bowel movement for stool samples.

## 2025-04-10 NOTE — PROGRESS NOTES
[facility]                       Gastroenterology Follow up Note - [facility]    Esperanza Mauro Patient Status:  Inpatient    1985 MRN [id number]   Location [facility] 4NW-A Attending Wilson Narayan,    Hosp Day # 2 PCP None Pcp     Reason for consultation: Rectal pain, history of Crohn's disease  Subjective: Patient seen and examined.  She continues to have rectal pain.  She has not had a bowel movement but does continue to have occasional drainage of blood and pus.  Denies nausea or emesis.  Denies nausea or emesis.  Minimal left lower quadrant abdominal pain.  Review of Systems:   10 point ROS completed and was negative, except for pertinent positive and negatives stated in subjective.    For PMH, PSH, FHx and SHx- please see initial consult note.     Objective: BP 97/65 (BP Location: Right arm)   Pulse 76   Temp 98.2 °F (36.8 °C) (Oral)   Resp 16   Ht 5' 5\" (1.651 m)   Wt 143 lb 8 oz (65.1 kg)   LMP 10/01/2024   SpO2 93%   BMI 23.88 kg/m²   Gen: No acute distress.  Surgery PA present in room  Resp: no respiratory distress  Abd: Soft, non-tender, non-distended. No rebound tenderness, no guarding.   Neuro: Aox3.     Labs:   Lab Results   Component Value Date    WBC 5.3 04/10/2025    HGB 10.6 04/10/2025    HCT 32.5 04/10/2025    .0 04/10/2025     Recent Labs   Lab 25  0523   * 178*   BUN 11 8*   CREATSERUM 0.85 0.54*   CA 9.4 8.9   * 139   K 4.8 3.6    107   CO2 21.0 26.0     Recent Labs   Lab 04/10/25  0820   RBC 3.52*   HGB 10.6*   HCT 32.5*   MCV 92.3   MCH 30.1   MCHC 32.6   RDW 15.2   NEPRELIM 3.07   WBC 5.3   .0       Recent Labs   Lab 25   ALT 15   AST 30       Assessment:  Impression:  40 yr-old female with hx of uncontrolled DM (A1C 9.7) and Crohn's disease of the colon (dx 8 yrs ago; follows with her established general surgeon at [facility] who prescribes Remicade 7.5 mg/q 12 wks--last infusion  4 weeks ago) who carries a hx of hiwot-anal disease r/t hidradenitis suppurativa and more recently fistulas requiring Seton placement (reports 70+ surgeries at [facility]) admitted with acute on chronic rectal pain and rectal discharge following anorectal exam under anesthesia 4/3 at [facility] for chronic anorectal pain which revealed a left posterolateral intersphincteric fistula s/p seton placement of tract + perianal + pudendal block was administered for pain control.  CT A/P IV suggests moderate feces in the colon, no large or small bowel dilation, known seton in the anal rectal junction with no drainable fluid collection in this area; labs normal WBC, stable anemia (Hgb 10-11), marked hyperglycemia (glucose > 500 on arrival), normal kidney function, normal albumin  Will rule out acute infection, check inflammatory markers, and continue supportive care measures while continuing laxatives for stool burden.  Currently no role for IV steroids given no definitive IBD flare.     Recommendations:      Stool for C. difficile, GI PCR panel, and fecal calprotectin; enteric isolation  MiraLAX 17 g p.o. twice daily to improve stool burden  No plan for steroids at this time given no inflammation noted on CT and markedly elevated blood sugars  Okay for GI soft diet today from GI perspective.  N.p.o. after midnight for surgical exam under anesthesia tomorrow  Pain management per hospitalist recommendations limiting narcotics as able  Can consider outpatient gastric emptying scan with established GI if nausea persists; continue to optimize glucose control  Colorectal surgery consult given history of fistulas and current seton.  Tentatively planned for exam under anesthesia tomorrow.  Monitor for overt GI bleeding--no need for occult stool testing  CBC in a.m. transfusing as needed to maintain Hgb greater than 7    Thank you for allowing us to participate in patient's care. Please call us with any questions or  concerns.  The GI consult service will continue to follow.     Johnny Cabrera DO  [facility]  [phone]

## 2025-04-10 NOTE — CONSULTS
[facility]  Report of Consultation    Esperanza Mauro Patient Status:  Inpatient    1985 MRN [id number]   Location [facility] 4NW-A Attending Wilson Narayan,    Hosp Day # 2 PCP None Pcp     Requesting Physician:  CARLOS A Ghosh    Reason for Consultation:  Rectal pain, recurrent anal fistulas currently with a seton in place    Chief Complaint:  Rectal pain and concern for a Crohn's flare    History of Present Illness:  Esperanza Mauro is a 40 year old female who presents to [facility] on 2025 with complaints of severe rectal pain and an ongoing Crohn's flare.    The patient underwent an anal exam under anesthesia, identification of left posterior lateral intersphincteric fistula tract, with placement of vessel loop seton with Dr. Coelho at [facility] on 4/3/2025.    The patient states the first 3 to 4 days following her operation she did very well with minimal rectal pain.  The patient states yesterday morning, she woke up with severe rectal pain.  The pain is primarily on the left side.  She denies any enticing events.    The patient states she has had somewhat chronic rectal pain over the last 3 to 4 months.  She states this has been associated with her recurrent Crohn's flares.    The patient has a history of multiple anal fistulas.  She states they have been managed with fistulotomy, seton placement and Botox injections.  She states she began having issues with anal fistulas approximately 5 years ago.    Additionally, over the past 3 to 4 months, the patient states she has been having more frequent Crohn's flares.  She states she has a flare every week or every other week.  Her Crohn's flares are associated with left lower quadrant abdominal pain, diarrhea, mucus/pus in her stool and bright red blood in her stool.    She currently follows with a gastroenterologist at the [facility].  She states she currently receives Remicade infusions every 12 weeks.  Her  most recent infusion was approximately 2 weeks ago.    The patient states she is up-to-date with colonoscopy.  She states she had a colonoscopy last July, 2024 at [facility].  I am unable to find these records.    Upon presentation to the hospital, CT scan of the abdomen and pelvis revealed a possible stent or suture noted in the region of the anorectal junction.  No drainable fluid collection is identified.  Unremarkable appendix.  Moderate feces in the colon.  No large or small bowel dilation.  No free air or significant free fluid.  All other significant findings may be seen in the radiologist report.    The patient is afebrile.  WBCs 4.8.  Hemoglobin 10.1.  Platelets 281,000.  Hyperglycemic (178).  Electrolytes are within normal limits.  CRP is within normal limits.    Past medical history significant for Crohn's disease and type 1 diabetes mellitus.    The patient has undergone numerous operations for her perianal fistulas, but has never had an abdominal operation.    History:  Past Medical History[1]  Past Surgical History[2]  Family History[3]   reports that she has never smoked. She has never used smokeless tobacco. She reports that she does not currently use alcohol. She reports that she does not use drugs.    Allergies:  Allergies[4]    Medications:  Prescriptions Prior to Admission[5]    Current Hospital Medications[6]    Review of Systems:  Review of Systems   Constitutional:  Negative for chills and fever.   Gastrointestinal:  Positive for abdominal pain, diarrhea, blood in stool and rectal pain.   All other systems reviewed and are negative.      Physical Exam:  BP 97/65 (BP Location: Right arm)   Pulse 76   Temp 98.8 °F (37.1 °C) (Oral)   Resp 16   Ht 65\"   Wt 143 lb 8 oz (65.1 kg)   LMP 10/01/2024   SpO2 93%   BMI 23.88 kg/m²   Physical Exam  Vitals and nursing note reviewed. Exam conducted with a chaperone present.   Constitutional:       Appearance: Normal appearance. She is normal  weight.   HENT:      Head: Normocephalic and atraumatic.      Right Ear: External ear normal.      Left Ear: External ear normal.      Nose: Nose normal.   Eyes:      General: No scleral icterus.     Conjunctiva/sclera: Conjunctivae normal.   Pulmonary:      Effort: Pulmonary effort is normal. No respiratory distress.   Abdominal:      General: Abdomen is flat. There is no distension.      Palpations: Abdomen is soft. There is no mass.      Tenderness: There is abdominal tenderness in the right lower quadrant.      Hernia: No hernia is present.   Genitourinary:     Comments: Significant perianal scarring from prior fistulas.  There is a yellow vessel loop seton in place in the left lateral location.  Exam is extremely limited due to the patient's rectal pain.  Musculoskeletal:      Cervical back: Normal range of motion.   Skin:     General: Skin is warm and dry.   Neurological:      Mental Status: She is alert.   Psychiatric:         Mood and Affect: Mood normal.         Behavior: Behavior normal.         Judgment: Judgment normal.         Laboratory Data:  Recent Labs   Lab 04/08/25 2034 04/09/25  0523 04/09/25  1604 04/10/25  0149 04/10/25  0820   RBC 3.67* 3.33*  --   --  3.52*   HGB 11.4* 10.1* 10.7* 10.7* 10.6*   HCT 32.6* 30.2* 31.9* 31.4* 32.5*   MCV 88.8 90.7  --   --  92.3   MCH 31.1 30.3  --   --  30.1   MCHC 35.0 33.4  --   --  32.6   RDW 15.2 15.3  --   --  15.2   NEPRELIM 3.80 2.27  --   --  3.07   WBC 6.0 4.8  --   --  5.3   .0 281.0  --   --  282.0       Recent Labs   Lab 04/08/25 2040 04/09/25  0523   * 178*   BUN 11 8*   CREATSERUM 0.85 0.54*   CA 9.4 8.9   ALB 4.4  --    * 139   K 4.8 3.6    107   CO2 21.0 26.0   ALKPHO 118*  --    AST 30  --    ALT 15  --    BILT <0.2*  --    TP 6.8  --          No results for input(s): \"PTP\", \"INR\", \"PTT\" in the last 168 hours.      No results found.          Medical Decision Making         Impression:  Problem List[7]    Crohn's  disease  Anal fistula with seton in place   POD 5 anal exam under anesthesia with placement of seton  Rectal pain    Plan:  The patient's plan was discussed with Dr. Hong.  No plan for acute general surgical intervention at this time  Plan for symptomatic management of the patient's rectal pain  Will ultimately recommend returning to her original surgeon for further workup given her complex history with perianal fistulas  Appreciate GI recommendations regarding management of her Crohn's disease  Antiemetics and analgesics as needed  Activity as tolerated  DVT prophylaxis with SCDs-okay for anticoagulation from a general surgical standpoint    Leticia Phelps PA-C  4/9/2025  4:56 PM    Is this a shared or split note between Advanced Practice Provider and Physician? Yes        The patient was independently interviewed and examined by myself.  More than 50% of clinical time and 100% MDM was performed by myself.   I personally performed the services described in this documentation and I agree with the assessment and plan as set forth above and discussed with the physician assistant.       40-year-old female who presents to the emergency department with severe rectal pain.  Esperanza does have a extensive medical history including type 1 diabetes and severe Crohn's disease.  The patient's Crohn's disease has been complicated by extensive perianal disease including fissures and fistulas which have required multiple procedures.  The patient's Crohn's disease was diagnosed 8 years ago and she does follow-up with a general surgeon at [facility] who has prescribed Remicade.    The patient was recently seen at [facility] by Dr. Coelho who performed an examination under anesthesia, identification of the intersphincteric fistulous track and placement of a seton.    Esperanza reports that for 2 days after surgery she was feeling well but subsequently developed severe rectal pain.  The pain was quite  severe and the patient presented to the emergency department for further management.  Workup in the ED revealed normal WBC  , Hemoglobin was slightly diminished at 10.1, this morning 10.6.  CMP essentially normal except alkaline phosphatase mildly elevated 118.  Patient's initial glucose was 522 this morning 179.  CT scan of the abdomen and pelvis was performed this reveals a 2 cm left renal lesion which may represent a solid mass versus a proteinaceous cyst.  Renal ultrasound should be ordered.  No perianal abscesses noted.    Physical examination is limited by the patient's discomfort.  On external examination a seton is noted in the left lateral quadrant.  There is no blood or purulent fluid noted.  Extensive perianal scarring is noted from multiple prior surgical procedures.    Treatment options were reviewed in detail with Esperanza.  We did discuss that due to the complexity of her anorectal disease, colorectal surgery would be consulted.  We discussed the option of having the [facility] colorectal surgeon Dr. Gibson see her versus the patient may return to [facility] to be seen by the surgeon who operated on her 6 days ago.  We also discussed that as she was seeing a gastroenterologist at the [facility], if she wanted to establish care with a colorectal surgeon at the [facility] this would also be possible.  Esperanza reports that she was not satisfied with her care at the [facility] and therefore will not return to that institution.  She is concerned that he may not be able to tolerate the 6-hour drive back to the [facility] and her current condition.  Therefore, she would be amenable to seeing Dr. Gibson at [facility].  No indication for emergent surgical intervention today.  Case will be discussed with Dr. Gibson.  Esperanza is comfortable with this treatment plan.  She has no further questions at this time.      GISELLE Hong MD, FACS    Please note that this  report has been produced using speech recognition software and may contain errors related to that system including but not limited to errors in grammar, punctuation and spelling as well as words and phrases that possibly may have been recognized inappropriately.  If there are any questions or concerns please contact the dictating provider for clarification.  The 21st Century Cures Act makes medical notes like these available to patients in the interest of trans parency. Please be advised this is a medical document. Medical documents are intended to carry relevant information, facts as evident, and the clinical opinion of the practitioner. The medical note is intended as peer to peer communication and may appear blunt or direct. It is written in medical language and may contain abbreviations or verbiage that are unfamiliar.  If there are any questions or concerns please contact the dictating provider for clarification.                                   [1]   Past Medical History:   Crohn's disease (HCC)    Type 1 diabetes mellitus (HCC)   [2]   Past Surgical History:  Procedure Laterality Date    I&d deep absc neck/chst+rib cut      Other surgical history  04/02/2025    [facility]. rectal fistula repair   [3] History reviewed. No pertinent family history.  [4]   Allergies  Allergen Reactions    Vancomycin ANAPHYLAXIS    Morphine RASH    Toradol [Ketorolac Tromethamine] RASH   [5]   Medications Prior to Admission   Medication Sig    HYDROmorphone 4 MG Oral Tab Take 1 tablet (4 mg total) by mouth every 4 (four) hours as needed for Pain.    escitalopram 20 MG Oral Tab Take 1 tablet (20 mg total) by mouth daily.    inFLIXimab (REMICADE IV)     TRESIBA FLEXTOUCH 100 UNIT/ML Subcutaneous Solution Pen-injector Inject 40 Units into the skin daily. Prime 2 units before each dose (Patient taking differently: Inject 15 Units into the skin 2 (two) times daily. Prime 2 units before each dose)    HUMALOG KWIKPEN 100 UNIT/ML  Subcutaneous Solution Pen-injector Inject 8-14 Units into the skin 3 (three) times daily with meals. Plus sliding scale. Carb ratio 1-6 grams. Correction 1-30. Total 50 units daily. (Patient taking differently: Inject 3-6 Units into the skin 3 (three) times daily with meals. Per sliding scale)    Amphetamine-Dextroamphet ER 20 MG Oral Capsule SR 24 Hr Take 1 capsule (20 mg total) by mouth every morning. (Patient taking differently: Take 1 capsule (20 mg total) by mouth daily as needed.)    amphetamine-dextroamphetamine 10 MG Oral Tab Take 1 tablet (10 mg total) by mouth daily. (Patient taking differently: Take 1 tablet (10 mg total) by mouth daily as needed.)    PROMETHEGAN 25 MG Rectal Suppos Place 1 suppository (25 mg total) rectally every 6 (six) hours as needed.   [6]   Current Facility-Administered Medications:     pantoprazole (Protonix) 40 mg in sodium chloride 0.9% PF 10 mL IV push, 40 mg, Intravenous, Q24H    escitalopram (Lexapro) tab 20 mg, 20 mg, Oral, Daily    glucose (Dex4) 15 GM/59ML oral liquid 15 g, 15 g, Oral, Q15 Min PRN **OR** glucose (Glutose) 40% oral gel 15 g, 15 g, Oral, Q15 Min PRN **OR** glucose-vitamin C (Dex-4) chewable tab 4 tablet, 4 tablet, Oral, Q15 Min PRN **OR** dextrose 50% injection 50 mL, 50 mL, Intravenous, Q15 Min PRN **OR** glucose (Dex4) 15 GM/59ML oral liquid 30 g, 30 g, Oral, Q15 Min PRN **OR** glucose (Glutose) 40% oral gel 30 g, 30 g, Oral, Q15 Min PRN **OR** glucose-vitamin C (Dex-4) chewable tab 8 tablet, 8 tablet, Oral, Q15 Min PRN    acetaminophen (Tylenol) tab 650 mg, 650 mg, Oral, Q4H PRN **OR** HYDROcodone-acetaminophen (Norco) 5-325 MG per tab 1 tablet, 1 tablet, Oral, Q4H PRN **OR** HYDROcodone-acetaminophen (Norco) 5-325 MG per tab 2 tablet, 2 tablet, Oral, Q4H PRN    melatonin tab 3 mg, 3 mg, Oral, Nightly PRN    ondansetron (Zofran) 4 MG/2ML injection 4 mg, 4 mg, Intravenous, Q6H PRN    prochlorperazine (Compazine) 10 MG/2ML injection 5 mg, 5 mg, Intravenous,  Q8H PRN    insulin degludec (Tresiba) 100 units/mL flextouch 10 Units, 10 Units, Subcutaneous, Daily    insulin aspart (NovoLOG) 100 Units/mL FlexPen 1-68 Units, 1-68 Units, Subcutaneous, TID CC    insulin aspart (NovoLOG) 100 Units/mL FlexPen 1-10 Units, 1-10 Units, Subcutaneous, TID AC and HS    metroNIDAZOLE in sodium chloride 0.79% (Flagyl) 5 mg/mL IVPB premix 500 mg, 500 mg, Intravenous, Q12H    dextrose 5%-sodium chloride 0.9% infusion, , Intravenous, Continuous    HYDROmorphone (Dilaudid) 1 MG/ML injection 0.5 mg, 0.5 mg, Intravenous, Q2H PRN **OR** HYDROmorphone (Dilaudid) 1 MG/ML injection 1 mg, 1 mg, Intravenous, Q2H PRN **OR** HYDROmorphone (Dilaudid) 1 MG/ML injection 2 mg, 2 mg, Intravenous, Q2H PRN    polyethylene glycol (PEG 3350) (Miralax) 17 g oral packet 17 g, 17 g, Oral, BID    ondansetron (Zofran) 4 MG/2ML injection 4 mg, 4 mg, Intravenous, Once  [7]   Patient Active Problem List  Diagnosis    Hyponatremia    Hyperglycemia    Anemia    Crohn's disease without complication, unspecified gastrointestinal tract location (HCC)    Type 1 diabetes mellitus with hyperglycemia (HCC)    Uncontrolled type 1 diabetes mellitus with hypoglycemia without coma (HCC)    Rectal pain    Exacerbation of Crohn's disease of large intestine (HCC)

## 2025-04-10 NOTE — PLAN OF CARE
Patient alert and oriented x4. VSS. Afebrile. C/o rectal pain. PRN pain medication administered. BG coverage provided per parameters. IVF infusing. See MAR. No reported BM at this time. Safety precautions continued. Call light within reach.   0111: Pt requesting additional dose of IV pain medication. MD notified, no new orders received.    Problem: METABOLIC/FLUID AND ELECTROLYTES - ADULT  Goal: Glucose maintained within prescribed range  Description: INTERVENTIONS:- Monitor Blood Glucose as ordered- Assess for signs and symptoms of hyperglycemia and hypoglycemia- Administer ordered medications to maintain glucose within target range- Assess barriers to adequate nutritional intake and initiate nutrition consult as needed- Instruct patient on self management of diabetes  Outcome: Progressing     Problem: HEMATOLOGIC - ADULT  Goal: Maintains hematologic stability  Description: INTERVENTIONS- Assess for signs and symptoms of bleeding or hemorrhage- Monitor labs and vital signs for trends- Administer supportive blood products/factors, fluids and medications as ordered and appropriate- Administer supportive blood products/factors as ordered and appropriate  Outcome: Progressing     Problem: PAIN - ADULT  Goal: Verbalizes/displays adequate comfort level or patient's stated pain goal  Description: INTERVENTIONS:- Encourage pt to monitor pain and request assistance- Assess pain using appropriate pain scale- Administer analgesics based on type and severity of pain and evaluate response- Implement non-pharmacological measures as appropriate and evaluate response- Consider cultural and social influences on pain and pain management- Manage/alleviate anxiety- Utilize distraction and/or relaxation techniques- Monitor for opioid side effects- Notify MD/LIP if interventions unsuccessful or patient reports new pain- Anticipate increased pain with activity and pre-medicate as appropriate  Outcome: Not Progressing

## 2025-04-11 ENCOUNTER — ANESTHESIA (OUTPATIENT)
Dept: SURGERY | Facility: HOSPITAL | Age: 40
End: 2025-04-11
Payer: COMMERCIAL

## 2025-04-11 PROBLEM — K60.30 ANAL FISTULA: Status: ACTIVE | Noted: 2025-04-11

## 2025-04-11 LAB
ERYTHROCYTE [DISTWIDTH] IN BLOOD BY AUTOMATED COUNT: 14.9 %
GLUCOSE BLD-MCNC: 105 MG/DL (ref 70–99)
GLUCOSE BLD-MCNC: 138 MG/DL (ref 70–99)
GLUCOSE BLD-MCNC: 222 MG/DL (ref 70–99)
GLUCOSE BLD-MCNC: 244 MG/DL (ref 70–99)
GLUCOSE BLD-MCNC: 369 MG/DL (ref 70–99)
GLUCOSE BLD-MCNC: 390 MG/DL (ref 70–99)
GLUCOSE BLD-MCNC: 94 MG/DL (ref 70–99)
HCG UR QL: NEGATIVE
HCT VFR BLD AUTO: 32.3 % (ref 35–48)
HGB BLD-MCNC: 10.8 G/DL (ref 12–16)
MCH RBC QN AUTO: 30.8 PG (ref 26–34)
MCHC RBC AUTO-ENTMCNC: 33.4 G/DL (ref 31–37)
MCV RBC AUTO: 92 FL (ref 80–100)
PLATELET # BLD AUTO: 291 10(3)UL (ref 150–450)
RBC # BLD AUTO: 3.51 X10(6)UL (ref 3.8–5.3)
WBC # BLD AUTO: 6.6 X10(3) UL (ref 4–11)

## 2025-04-11 PROCEDURE — 0DJD8ZZ INSPECTION OF LOWER INTESTINAL TRACT, VIA NATURAL OR ARTIFICIAL OPENING ENDOSCOPIC: ICD-10-PCS | Performed by: STUDENT IN AN ORGANIZED HEALTH CARE EDUCATION/TRAINING PROGRAM

## 2025-04-11 PROCEDURE — 99232 SBSQ HOSP IP/OBS MODERATE 35: CPT | Performed by: HOSPITALIST

## 2025-04-11 PROCEDURE — 46285 REMOVE ANAL FIST 2 STAGE: CPT | Performed by: PHYSICIAN ASSISTANT

## 2025-04-11 PROCEDURE — 99233 SBSQ HOSP IP/OBS HIGH 50: CPT | Performed by: STUDENT IN AN ORGANIZED HEALTH CARE EDUCATION/TRAINING PROGRAM

## 2025-04-11 PROCEDURE — 46285 REMOVE ANAL FIST 2 STAGE: CPT | Performed by: STUDENT IN AN ORGANIZED HEALTH CARE EDUCATION/TRAINING PROGRAM

## 2025-04-11 PROCEDURE — 0DQQ7ZZ REPAIR ANUS, VIA NATURAL OR ARTIFICIAL OPENING: ICD-10-PCS | Performed by: STUDENT IN AN ORGANIZED HEALTH CARE EDUCATION/TRAINING PROGRAM

## 2025-04-11 RX ORDER — ACETAMINOPHEN 500 MG
1000 TABLET ORAL ONCE AS NEEDED
Status: DISCONTINUED | OUTPATIENT
Start: 2025-04-11 | End: 2025-04-11 | Stop reason: HOSPADM

## 2025-04-11 RX ORDER — PROCHLORPERAZINE EDISYLATE 5 MG/ML
5 INJECTION INTRAMUSCULAR; INTRAVENOUS EVERY 8 HOURS PRN
Status: DISCONTINUED | OUTPATIENT
Start: 2025-04-11 | End: 2025-04-11 | Stop reason: HOSPADM

## 2025-04-11 RX ORDER — POLYETHYLENE GLYCOL 3350 17 G/17G
17 POWDER, FOR SOLUTION ORAL DAILY
Qty: 510 G | Refills: 2 | Status: ON HOLD | OUTPATIENT
Start: 2025-04-11

## 2025-04-11 RX ORDER — HYDROMORPHONE HYDROCHLORIDE 1 MG/ML
0.6 INJECTION, SOLUTION INTRAMUSCULAR; INTRAVENOUS; SUBCUTANEOUS EVERY 5 MIN PRN
Status: DISCONTINUED | OUTPATIENT
Start: 2025-04-11 | End: 2025-04-11 | Stop reason: HOSPADM

## 2025-04-11 RX ORDER — BUPIVACAINE HYDROCHLORIDE AND EPINEPHRINE 5; 5 MG/ML; UG/ML
INJECTION, SOLUTION EPIDURAL; INTRACAUDAL; PERINEURAL AS NEEDED
Status: DISCONTINUED | OUTPATIENT
Start: 2025-04-11 | End: 2025-04-11 | Stop reason: HOSPADM

## 2025-04-11 RX ORDER — NALOXONE HYDROCHLORIDE 0.4 MG/ML
0.08 INJECTION, SOLUTION INTRAMUSCULAR; INTRAVENOUS; SUBCUTANEOUS AS NEEDED
Status: DISCONTINUED | OUTPATIENT
Start: 2025-04-11 | End: 2025-04-11 | Stop reason: HOSPADM

## 2025-04-11 RX ORDER — MIDAZOLAM HYDROCHLORIDE 1 MG/ML
INJECTION INTRAMUSCULAR; INTRAVENOUS AS NEEDED
Status: DISCONTINUED | OUTPATIENT
Start: 2025-04-11 | End: 2025-04-11 | Stop reason: SURG

## 2025-04-11 RX ORDER — GLYCOPYRROLATE 0.2 MG/ML
INJECTION, SOLUTION INTRAMUSCULAR; INTRAVENOUS AS NEEDED
Status: DISCONTINUED | OUTPATIENT
Start: 2025-04-11 | End: 2025-04-11 | Stop reason: SURG

## 2025-04-11 RX ORDER — ONDANSETRON 2 MG/ML
INJECTION INTRAMUSCULAR; INTRAVENOUS AS NEEDED
Status: DISCONTINUED | OUTPATIENT
Start: 2025-04-11 | End: 2025-04-11 | Stop reason: SURG

## 2025-04-11 RX ORDER — HYDROMORPHONE HYDROCHLORIDE 1 MG/ML
0.4 INJECTION, SOLUTION INTRAMUSCULAR; INTRAVENOUS; SUBCUTANEOUS EVERY 5 MIN PRN
Status: DISCONTINUED | OUTPATIENT
Start: 2025-04-11 | End: 2025-04-11 | Stop reason: HOSPADM

## 2025-04-11 RX ORDER — DIPHENHYDRAMINE HYDROCHLORIDE 50 MG/ML
12.5 INJECTION, SOLUTION INTRAMUSCULAR; INTRAVENOUS AS NEEDED
Status: DISCONTINUED | OUTPATIENT
Start: 2025-04-11 | End: 2025-04-11 | Stop reason: HOSPADM

## 2025-04-11 RX ORDER — MIDAZOLAM HYDROCHLORIDE 1 MG/ML
1 INJECTION INTRAMUSCULAR; INTRAVENOUS EVERY 5 MIN PRN
Status: DISCONTINUED | OUTPATIENT
Start: 2025-04-11 | End: 2025-04-11 | Stop reason: HOSPADM

## 2025-04-11 RX ORDER — POLYETHYLENE GLYCOL 3350 17 G/17G
17 POWDER, FOR SOLUTION ORAL 2 TIMES DAILY
Status: DISCONTINUED | OUTPATIENT
Start: 2025-04-11 | End: 2025-04-11

## 2025-04-11 RX ORDER — ROCURONIUM BROMIDE 10 MG/ML
INJECTION, SOLUTION INTRAVENOUS AS NEEDED
Status: DISCONTINUED | OUTPATIENT
Start: 2025-04-11 | End: 2025-04-11 | Stop reason: SURG

## 2025-04-11 RX ORDER — PHENYLEPHRINE HCL 10 MG/ML
VIAL (ML) INJECTION AS NEEDED
Status: DISCONTINUED | OUTPATIENT
Start: 2025-04-11 | End: 2025-04-11 | Stop reason: SURG

## 2025-04-11 RX ORDER — NEOSTIGMINE METHYLSULFATE 1 MG/ML
INJECTION INTRAVENOUS AS NEEDED
Status: DISCONTINUED | OUTPATIENT
Start: 2025-04-11 | End: 2025-04-11 | Stop reason: SURG

## 2025-04-11 RX ORDER — CEFAZOLIN SODIUM 1 G/3ML
INJECTION, POWDER, FOR SOLUTION INTRAMUSCULAR; INTRAVENOUS AS NEEDED
Status: DISCONTINUED | OUTPATIENT
Start: 2025-04-11 | End: 2025-04-11 | Stop reason: SURG

## 2025-04-11 RX ORDER — SODIUM CHLORIDE, SODIUM LACTATE, POTASSIUM CHLORIDE, CALCIUM CHLORIDE 600; 310; 30; 20 MG/100ML; MG/100ML; MG/100ML; MG/100ML
INJECTION, SOLUTION INTRAVENOUS CONTINUOUS PRN
Status: DISCONTINUED | OUTPATIENT
Start: 2025-04-11 | End: 2025-04-11 | Stop reason: SURG

## 2025-04-11 RX ORDER — MEPERIDINE HYDROCHLORIDE 25 MG/ML
INJECTION INTRAMUSCULAR; INTRAVENOUS; SUBCUTANEOUS
Status: COMPLETED
Start: 2025-04-11 | End: 2025-04-11

## 2025-04-11 RX ORDER — OXYCODONE HYDROCHLORIDE 5 MG/1
5 TABLET ORAL EVERY 4 HOURS PRN
Refills: 0 | Status: DISCONTINUED | OUTPATIENT
Start: 2025-04-11 | End: 2025-04-12

## 2025-04-11 RX ORDER — HYDROCODONE BITARTRATE AND ACETAMINOPHEN 5; 325 MG/1; MG/1
1 TABLET ORAL ONCE AS NEEDED
Status: DISCONTINUED | OUTPATIENT
Start: 2025-04-11 | End: 2025-04-11 | Stop reason: HOSPADM

## 2025-04-11 RX ORDER — ACETAMINOPHEN 500 MG
1000 TABLET ORAL EVERY 8 HOURS
Status: DISCONTINUED | OUTPATIENT
Start: 2025-04-11 | End: 2025-04-12

## 2025-04-11 RX ORDER — ONDANSETRON 2 MG/ML
4 INJECTION INTRAMUSCULAR; INTRAVENOUS EVERY 6 HOURS PRN
Status: DISCONTINUED | OUTPATIENT
Start: 2025-04-11 | End: 2025-04-11 | Stop reason: HOSPADM

## 2025-04-11 RX ORDER — LIDOCAINE HYDROCHLORIDE 10 MG/ML
INJECTION, SOLUTION EPIDURAL; INFILTRATION; INTRACAUDAL; PERINEURAL AS NEEDED
Status: DISCONTINUED | OUTPATIENT
Start: 2025-04-11 | End: 2025-04-11 | Stop reason: SURG

## 2025-04-11 RX ORDER — METOCLOPRAMIDE HYDROCHLORIDE 5 MG/ML
INJECTION INTRAMUSCULAR; INTRAVENOUS AS NEEDED
Status: DISCONTINUED | OUTPATIENT
Start: 2025-04-11 | End: 2025-04-11 | Stop reason: SURG

## 2025-04-11 RX ORDER — HYDROCODONE BITARTRATE AND ACETAMINOPHEN 5; 325 MG/1; MG/1
2 TABLET ORAL ONCE AS NEEDED
Status: DISCONTINUED | OUTPATIENT
Start: 2025-04-11 | End: 2025-04-11 | Stop reason: HOSPADM

## 2025-04-11 RX ORDER — HYDROMORPHONE HYDROCHLORIDE 1 MG/ML
0.2 INJECTION, SOLUTION INTRAMUSCULAR; INTRAVENOUS; SUBCUTANEOUS EVERY 5 MIN PRN
Status: DISCONTINUED | OUTPATIENT
Start: 2025-04-11 | End: 2025-04-11 | Stop reason: HOSPADM

## 2025-04-11 RX ORDER — SODIUM CHLORIDE, SODIUM LACTATE, POTASSIUM CHLORIDE, CALCIUM CHLORIDE 600; 310; 30; 20 MG/100ML; MG/100ML; MG/100ML; MG/100ML
INJECTION, SOLUTION INTRAVENOUS CONTINUOUS
Status: DISCONTINUED | OUTPATIENT
Start: 2025-04-11 | End: 2025-04-11 | Stop reason: HOSPADM

## 2025-04-11 RX ORDER — MEPERIDINE HYDROCHLORIDE 25 MG/ML
12.5 INJECTION INTRAMUSCULAR; INTRAVENOUS; SUBCUTANEOUS AS NEEDED
Status: COMPLETED | OUTPATIENT
Start: 2025-04-11 | End: 2025-04-11

## 2025-04-11 RX ORDER — DEXAMETHASONE SODIUM PHOSPHATE 4 MG/ML
VIAL (ML) INJECTION AS NEEDED
Status: DISCONTINUED | OUTPATIENT
Start: 2025-04-11 | End: 2025-04-11 | Stop reason: SURG

## 2025-04-11 RX ORDER — KETAMINE HYDROCHLORIDE 50 MG/ML
INJECTION, SOLUTION INTRAMUSCULAR; INTRAVENOUS AS NEEDED
Status: DISCONTINUED | OUTPATIENT
Start: 2025-04-11 | End: 2025-04-11 | Stop reason: SURG

## 2025-04-11 RX ADMIN — ONDANSETRON 4 MG: 2 INJECTION INTRAMUSCULAR; INTRAVENOUS at 11:16:00

## 2025-04-11 RX ADMIN — SODIUM CHLORIDE, SODIUM LACTATE, POTASSIUM CHLORIDE, CALCIUM CHLORIDE: 600; 310; 30; 20 INJECTION, SOLUTION INTRAVENOUS at 10:26:00

## 2025-04-11 RX ADMIN — ROCURONIUM BROMIDE 40 MG: 10 INJECTION, SOLUTION INTRAVENOUS at 10:30:00

## 2025-04-11 RX ADMIN — PHENYLEPHRINE HCL 100 MCG: 10 MG/ML VIAL (ML) INJECTION at 10:41:00

## 2025-04-11 RX ADMIN — GLYCOPYRROLATE 0.4 MG: 0.2 INJECTION, SOLUTION INTRAMUSCULAR; INTRAVENOUS at 11:24:00

## 2025-04-11 RX ADMIN — METRONIDAZOLE 500 MG: 500 INJECTION, SOLUTION INTRAVENOUS at 10:50:00

## 2025-04-11 RX ADMIN — NEOSTIGMINE METHYLSULFATE 2 MG: 1 INJECTION INTRAVENOUS at 11:24:00

## 2025-04-11 RX ADMIN — METOCLOPRAMIDE HYDROCHLORIDE 10 MG: 5 INJECTION INTRAMUSCULAR; INTRAVENOUS at 10:51:00

## 2025-04-11 RX ADMIN — MIDAZOLAM HYDROCHLORIDE 2 MG: 1 INJECTION INTRAMUSCULAR; INTRAVENOUS at 10:24:00

## 2025-04-11 RX ADMIN — CEFAZOLIN SODIUM 2 G: 1 INJECTION, POWDER, FOR SOLUTION INTRAMUSCULAR; INTRAVENOUS at 10:46:00

## 2025-04-11 RX ADMIN — LIDOCAINE HYDROCHLORIDE 50 MG: 10 INJECTION, SOLUTION EPIDURAL; INFILTRATION; INTRACAUDAL; PERINEURAL at 10:30:00

## 2025-04-11 RX ADMIN — NEOSTIGMINE METHYLSULFATE 3 MG: 1 INJECTION INTRAVENOUS at 11:15:00

## 2025-04-11 RX ADMIN — KETAMINE HYDROCHLORIDE 20 MG: 50 INJECTION, SOLUTION INTRAMUSCULAR; INTRAVENOUS at 10:50:00

## 2025-04-11 RX ADMIN — GLYCOPYRROLATE 0.4 MG: 0.2 INJECTION, SOLUTION INTRAMUSCULAR; INTRAVENOUS at 11:15:00

## 2025-04-11 RX ADMIN — DEXAMETHASONE SODIUM PHOSPHATE 8 MG: 4 MG/ML VIAL (ML) INJECTION at 10:45:00

## 2025-04-11 NOTE — PROGRESS NOTES
[facility]   part of [facility]     Hospitalist Progress Note     Esperanza Mauro Patient Status:  Inpatient    1985 MRN [id number]   Location [facility] 4NW-A Attending Wilson Narayan,    Hosp Day # 3 PCP None Pcp     Chief Complaint: rectal pain    Subjective:     Pt cont to have pain, aware of plan to go to OR this afternoon.     Objective:    Review of Systems:   A comprehensive review of systems was completed; pertinent positive and negatives stated in subjective.    Vital signs:  Temp:  [98.2 °F (36.8 °C)-99.8 °F (37.7 °C)] 98.9 °F (37.2 °C)  Pulse:  [77-80] 80  Resp:  [16-18] 16  BP: ()/(53-62) 101/62  SpO2:  [95 %] 95 %    Physical Exam:    General: No acute distress  Respiratory: No wheezes, no rhonchi  Cardiovascular: S1, S2, regular rate and rhythm  Abdomen: Soft, Non-tender, non-distended, positive bowel sounds  Neuro: No new focal deficits.   Extremities: No edema      Diagnostic Data:    Labs:  Recent Labs   Lab 25  0523 25  1604 04/10/25  0149 04/10/25  0820 25  0629   WBC 6.0 4.8  --   --  5.3 6.6   HGB 11.4* 10.1* 10.7* 10.7* 10.6* 10.8*   MCV 88.8 90.7  --   --  92.3 92.0   .0 281.0  --   --  282.0 291.0       Recent Labs   Lab 25  0523   * 178*   BUN 11 8*   CREATSERUM 0.85 0.54*   CA 9.4 8.9   ALB 4.4  --    * 139   K 4.8 3.6    107   CO2 21.0 26.0   ALKPHO 118*  --    AST 30  --    ALT 15  --    BILT <0.2*  --    TP 6.8  --        Estimated Glomerular Filtration Rate: 119 mL/min/1.73m2 (result from lab).    No results for input(s): \"TROP\", \"TROPHS\", \"CK\" in the last 168 hours.    No results for input(s): \"PTP\", \"INR\" in the last 168 hours.               Microbiology    No results found for this visit on 25.      Imaging: Reviewed in Epic.    Medications: Scheduled Medications[1]    Assessment & Plan:      #Rectal pain   #Bloody diarrhea  #Crohn's disease  -recent seton surgery at  [facility]  -GI eval noted  -Gen sx eval noted; discussed w/ Dr Hong; plan to go to OR w/ colorectal surgeron Dr. Gibson later today  -Flagyl  -CT negative for perirectal abscess  -pain control w/ q2h prn iv dilaudid   -on remicaide OP     #Pseudohyponatremia, improving   -cont IVF     #DM type I  -A1c 9.7 as of March 2025  -PTA: 30u tresiba, currently 10u + ISS, pt npo  -carb counting and SSI      Wilson Narayan, DO    Supplementary Documentation:     Quality:  DVT Mechanical Prophylaxis:   SCDs,    DVT Pharmacologic Prophylaxis   Medication   None                Code Status: Not on file  [place]: No urinary catheter in place  [place] Duration (in days):   Central line:    ANNABEL:     Discharge is dependent on: pain control   At this point Ms. Mauro is expected to be discharge to: tbd    The 21st Century Cures Act makes medical notes like these available to patients in the interest of transparency. Please be advised this is a medical document. Medical documents are intended to carry relevant information, facts as evident, and the clinical opinion of the practitioner. The medical note is intended as peer to peer communication and may appear blunt or direct. It is written in medical language and may contain abbreviations or verbiage that are unfamiliar.                         [1]    pantoprazole  40 mg Intravenous Q24H    escitalopram  20 mg Oral Daily    insulin degludec  10 Units Subcutaneous Daily    insulin aspart  1-68 Units Subcutaneous TID CC    insulin aspart  1-10 Units Subcutaneous TID AC and HS    metroNIDAZOLE  500 mg Intravenous Q12H    polyethylene glycol (PEG 3350)  17 g Oral BID    ondansetron  4 mg Intravenous Once

## 2025-04-11 NOTE — PROGRESS NOTES
[facility]  Progress Note    Esperanza Mauro Patient Status:  Inpatient    1985 MRN [id number]   Location [facility] 4NW-A Attending Wilson Narayan,    Hosp Day # 3 PCP None Pcp     Subjective:  Patient seen and examined at the bedside.  No acute events overnight.  I was asked to evaluate this patient from my partner, Dr. Hong, as a colorectal specialist.    Patient history reviewed.  She carries a diagnosis of Crohn's disease and hidradenitis suppurativa.  She has undergone reportedly 70+ anorectal operations primarily performed by Dr. Eliceo Adam, colorectal surgeon at [facility] in [facility].  No prior abdominal surgery.  Patient has been tried on Remicade and Humira in the past without significant relief.  She is currently not on any Biologics.     Patient has been dealing with severe rectal and vaginal pain for around 2 months now associated with malodorous drainage from the rectum and vagina.  She states that bowel movements are extremely painful.  They are loose but not edenilson watery diarrhea.  She has had some bright red blood in her stool.      Most recently, patient was evaluated by colorectal surgeons at [facility].  She underwent an anal exam under anesthesia with anoscopy at [facility] on 4/3/2025.  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  A seton was placed through this fistula tract.  Patient states she initially felt much better after this operation the first 4 days.  Unfortunately, patient developed recurrent, severe rectal pain prompting her to come to the [place] emergency department on 2025.    Patient continues to have severe rectal pain and complains of ongoing drainage from her rectum and vagina.  Patient states she has been prescribed narcotic pain medication after her many operations but does not take narcotic pain medication on a daily basis.  She has been on oral Dilaudid medication in the past and follows with  a pain management specialist for this.  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    A comprehensive 10 point review of systems was performed and negative unless otherwise documented in subjective.     Objective/Physical Exam:  /56 (BP Location: Left arm)   Pulse 80   Temp 98.2 °F (36.8 °C) (Oral)   Resp 18   Ht 65\"   Wt 143 lb 8 oz (65.1 kg)   LMP 10/01/2024   SpO2 95%   BMI 23.88 kg/m²     Intake/Output Summary (Last 24 hours) at 4/11/2025 0956  Last data filed at 4/11/2025 0542  Gross per 24 hour   Intake 1435 ml   Output --   Net 1435 ml         General: Awake, alert, no apparent distress, answering questions appropriately  HEENT: Normocephalic, atraumatic  Pulmonary: Non-labored breathing on room air  Cardiovascular: Hemodynamically normal  Abdomen:  Soft, non-distended, non-tender to palpation  Anorectal: Patient examined in the right lateral decubitus position with knees flexed.  External exam of the anus reveals a yellow vessel loop seton through a fistula tract with external opening around 1 cm from the anal verge in the left posterior position.  The entire left side of the anoderm is tender without bleeding obvious induration, fluctuance or expressible drainage.  The right side of the anoderm has several small punctate scars without any drainage, tenderness or skin changes.  Patient was quite uncomfortable even during spreading of the anus.  Digital rectal exam and anoscopy deferred.  Extremities: No gross deformities  Skin: Warm and dry  Psych: Appropriate mood and affect    Labs:  Lab Results   Component Value Date    WBC 6.6 04/11/2025    HGB 10.8 04/11/2025    HCT 32.3 04/11/2025    .0 04/11/2025      No results found for: \"PT\", \"INR\"        I personally reviewed the imaging of patient's CT scan of the abdomen and pelvis from 4/8/2025.  No evidence of a perirectal fluid collection or abscess.  I do see some  possible mild thickening of the distal rectum with seton in place.    Assessment:  Patient seen and examined at the bedside.  No acute events overnight.  I was asked to evaluate this patient from my partner, Dr. Hong, as a colorectal specialist.    Patient history reviewed.  She carries a diagnosis of Crohn's disease and hidradenitis suppurativa.  She has undergone reportedly 70+ anorectal operations primarily performed by Dr. Eliceo Adam, colorectal surgeon at [facility] in hospitals.  No prior abdominal surgery.  Patient has been tried on Remicade and Humira in the past without significant relief.  She is currently not on any Biologics.     Patient has been dealing with severe rectal and vaginal pain for around 2 months now associated with malodorous drainage from the rectum and vagina.  She states that bowel movements are extremely painful.  They are loose but not edenilson watery diarrhea.  She has had some bright red blood in her stool.      Most recently, patient was evaluated by colorectal surgeons at [facility].  She underwent an anal exam under anesthesia with anoscopy at [facility] on 4/3/2025.  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  A seton was placed through this fistula tract.  Patient states she initially felt much better after this operation the first 4 days.  Unfortunately, patient developed recurrent, severe rectal pain prompting her to come to the [place] emergency department on 4/8/2025.    Patient continues to have severe rectal pain and complains of ongoing drainage from her rectum and vagina.  Patient states she has been prescribed narcotic pain medication after her many operations but does not take narcotic pain medication on a daily basis.  She has been on oral Dilaudid medication in the past and follows with a pain management specialist for this.  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    External exam of the anus reveals a yellow vessel loop seton through a fistula tract with external opening around 1 cm from the anal verge in the left posterior position.  The entire left side of the anoderm is tender without bleeding obvious induration, fluctuance or expressible drainage.  The right side of the anoderm has several small punctate scars without any drainage, tenderness or skin changes.  Patient was quite uncomfortable even during spreading of the anus.  Digital rectal exam and anoscopy deferred.    I personally reviewed the imaging of patient's CT scan of the abdomen and pelvis from 4/8/2025.  No evidence of a perirectal fluid collection or abscess.  I do see some possible mild thickening of the distal rectum with seton in place.    Plan:  I recommend proceeding with an anal exam under anesthesia with anoscopy, possible incision and drainage of perirectal abscess, possible seton placement, possible biopsy.  The details of this procedure were discussed including the expected recovery time, risk, benefits and alternatives.  Specifically, I counseled patient that the purpose of this operation would be to be able to more thoroughly evaluate the patient's anal canal and distal rectum in an attempt to understand the etiology of her severe, uncontrolled rectal pain, pelvic pain and drainage.  I will attempt to rule out rectovaginal fistula.  If I do encounter an abscess, I may need to drain this and possibly place more setons.  If the rectal mucosa is inflamed, I may take a biopsy.  I counseled patient that I cannot guarantee that this operation will immediately fix her pain or that I will necessarily find pathology to explain her pain.    Patient expressed understanding and was agreeable to proceed with surgery.  She has been added onto my operating schedule.    Please keep patient n.p.o. with IV fluids.  Continue pain medication as needed.  Long-term plans and disposition to  be determined pending findings during EUA procedure.  I will attempt to coordinate care with the patient's colorectal surgery team at [facility].      Eamon Gibson MD  4/11/2025  9:56 AM

## 2025-04-11 NOTE — DISCHARGE INSTRUCTIONS
Post-Surgical Discharge Instructions    Eamon Gibson MD    Pain: You may take acetaminophen (Tylenol) up to 650 mg every 6 hours as needed for mild-moderate pain. You may take ibuprofen (Motrin) up to 600 mg every 6 hours as needed for mild-moderate pain. Take narcotic pain medication as needed for severe pain per your prescription. Do not drive while taking narcotic pain medication. Many patients take a stool softener as narcotics are known to cause constipation. Okay to use ice packs over abdomen. Narcotic medications as per pain specialist.    Diet:  No restrictions.    Activity:  No heavy lifting greater than 10 lbs and no exercising for a total of 6 weeks from the date of surgery.  You may walk and climb stairs with moderation. If your abdomen is more uncomfortable than the day before, you need to be less active as you may be pulling on your stitches.    Bathing:  You may shower as often as you would like, but no submerging the incisions under water for a total of 2 weeks from the date of surgery.  This includes no swimming, no baths, and no hot-tubs.      Wound:  Keep the wound dry.  No dressing is necessary unless there is drainage coming from the wound.  If the drainage is excessive or looks like pus, please call our office.    Driving: You may drive provided that you are no longer taking your narcotic pain medication.      Bowel Function:  After surgery, your bowel movements will be irregular.  It is normal to have up to 3 bowel movements a day or as low as 1 bowel movement every 3 days.  Occasionally, your movements may be even more irregular than this.  As long as you are not vomiting or have a fever over 100.3, you don’t need to be overly concerned.      Return to Work:  Most patients return to work after 1-2 weeks from the date of their surgery.  You will still have a lifting restriction of no greater than 10 lbs from the date of your surgery until 6 weeks.  If your work requires heavy lifting, you  will need to stay off work for 6 weeks.  When you are ready to return to work, please call the office and we will send a work release to your employer.      Appointment: Please call our office for an appointment in 10-14 days after surgery, unless otherwise instructed.  This will allow ample time for the swelling and soreness to resolve before your wound is examined. If you have fevers, chills, or if you are concerned about your wound, please call us immediately at [phone].      Thank you for entrusting us with your care.

## 2025-04-11 NOTE — PROGRESS NOTES
Appeal requested 4-28-22 I updated the patient on my intraoperative findings, surgical procedure and my treatment plan for her going forward.

## 2025-04-11 NOTE — PAYOR COMM NOTE
--------------  ADMISSION REVIEW     Payor: [employer] [facility] PPO  Subscriber #:  [id number]  Authorization Number: [id number]    Admit date: 4/8/25  Admit time: 11:44 PM     REVIEW DOCUMENTATION:  ED Provider Notes signed by Rubina Powell MD at 4/9/2025 12:25 AM       Author: Rubina Powell MD Service: -- Author Type: Physician    Filed: 4/9/2025 12:25 AM Date of Service: 4/8/2025  7:58 PM Status: Signed     Patient Seen in: [facility] Emergency Department    History     Chief Complaint   Patient presents with    Hyperglycemia     Stated Complaint: DM1, BG>500, recent surgery at [facility]    HPI  40-year-old female with a past medical history as below including DM1 and Crohn's disease status post seton placement for perianal fistula/abscess 5 days ago presents with increased rectal pain and drainage today along with Crohn's flare and elevated blood sugars.  Patient states she had the seton surgery done at [facility] and was doing well afterwards up until this morning when pain became severe and she noted purulent and malodor and drainage.  She also reports feeling like she is having a Crohn's flare with pain in her left lower quadrant and bloody diarrhea x 4.  She reports 2 episodes of NBNB emesis.  Reports feeling feverish and weak.  She states her blood sugar was over 500 and she gave herself correction doses of insulin with improvement but blood sugar would go up again.    Physical Exam     ED Triage Vitals [04/08/25 1938]   /80   Pulse 102   Resp 16   Temp 97.6 °F (36.4 °C)   Temp src Temporal   SpO2 98 %   O2 Device None (Room air)     Vital Signs  BP: 111/65  Pulse: 86  Resp: 16  Temp: 97.6 °F (36.4 °C)  Temp src: Temporal  MAP (mmHg): 80    Oxygen Therapy  SpO2: 100 %  O2 Device: None (Room air)    Physical Exam  Vitals and nursing note reviewed.   Constitutional:       Appearance: She is well-developed.   HENT:      Head: Normocephalic and atraumatic.      Mouth/Throat:       Mouth: Mucous membranes are moist.   Eyes:      General: No scleral icterus.  Cardiovascular:      Rate and Rhythm: Normal rate and regular rhythm.   Pulmonary:      Effort: Pulmonary effort is normal.      Breath sounds: Normal breath sounds.   Abdominal:      General: Bowel sounds are normal. There is no distension.      Palpations: Abdomen is soft.      Tenderness: There is abdominal tenderness. There is no guarding or rebound.      Comments: Left lower quadrant tenderness   Genitourinary:     Comments: Perianal area with setons in place diffuse tenderness  No focal erythema or mass noted  Skin:     General: Skin is warm and dry.   Neurological:      General: No focal deficit present.      Mental Status: She is alert and oriented to person, place, and time.      Cranial Nerves: No cranial nerve deficit.      Motor: No weakness.   Psychiatric:         Mood and Affect: Mood normal.         Behavior: Behavior normal.     ED Course     Labs Reviewed   COMP METABOLIC PANEL (14) - Abnormal; Notable for the following components:       Result Value    Glucose 522 (*)     Sodium 132 (*)     Calculated Osmolality 297 (*)     Alkaline Phosphatase 118 (*)     Bilirubin, Total <0.2 (*)     All other components within normal limits   CBC WITH DIFFERENTIAL WITH PLATELET - Abnormal; Notable for the following components:    RBC 3.67 (*)     HGB 11.4 (*)     HCT 32.6 (*)     All other components within normal limits   VENOUS BLOOD GAS - Abnormal; Notable for the following components:    Venous pH 7.47 (*)     Venous pCO2 31 (*)     Venous pO2 160 (*)     Venous O2Hb 90.7 (*)     All other components within normal limits   URINALYSIS WITH CULTURE REFLEX - Abnormal; Notable for the following components:    Urine Color Colorless (*)     Spec Gravity >1.030 (*)     Glucose Urine >1000 (*)     All other components within normal limits   POCT GLUCOSE - Abnormal; Notable for the following components:    POC Glucose 525 (*)     All  other components within normal limits   POCT GLUCOSE - Abnormal; Notable for the following components:    POC Glucose 268 (*)     All other components within normal limits   POCT GLUCOSE - Abnormal; Notable for the following components:    POC Glucose 68 (*)     All other components within normal limits   SED RATE, WESTERGREN (AUTOMATED) - Normal   C-REACTIVE PROTEIN - Normal   SCAN SLIDE   C. DIFFICILE(TOXIGENIC)PCR   GI STOOL PANEL BY PCR     CT ABDOMEN+PELVIS(CONTRAST ONLY)(CPT=74177)  Result Date: 4/8/2025  CONCLUSION:   1. There is a possible stent or suture noted in the region of the anal rectal junction.  No drainable fluid collection is identified in this area.  Clinical correlation and direct inspection is suggested.  2. Hypodense lesions with higher than fluid attenuation in the left kidney measuring up to 2.1 cm in the mid left kidney may represent a proteinaceous cysts, with solid renal mass not entirely excluded.  This could be further assessed with a dedicated renal ultrasound as clinically directed.      MDM      40-year-old female with a past medical history as below including DM1 and Crohn's disease status post seton placement for perianal fistula/abscess 5 days ago presents with increased rectal pain and drainage today along with Crohn's flare and elevated blood sugars.      Differential includes but is not limited to perianal/rectal abscess, Crohn's flare, uncontrolled DM1, DKA, dehydration, electrolyte abnormality    Labs show hyperglycemia with blood sugar 522 without evidence to DKA with normal pH, anion gap and bicarb.  WBC is normal at 6.0.  Patient has chronic anemia with hemoglobin 11.4, improved from previous 10.5 on 3/26/2025.    Independent interpretation of CT abdomen/pelvis shows no perianal perirectal abscess.  Radiology report reviewed as above noting no drainable fluid collection.    Patient treated with IV fluids and subcu insulin with improvement in blood sugar to 268.    Patient  reassessed and continues to complain of significant rectal pain despite treatment with several doses of Dilaudid.  Requesting admission for pain control.  Discussed with hospitalist Dr. Walker.  Discussed with GI Dr. Burgess recommends continuing Flagyl.    Admission disposition: 4/8/2025 11:07 PM    Medical Decision Making  Amount and/or Complexity of Data Reviewed  External Data Reviewed: labs.     Details: See MDM  Labs: ordered. Decision-making details documented in ED Course.  Radiology: ordered and independent interpretation performed. Decision-making details documented in ED Course.  Discussion of management or test interpretation with external provider(s): Hospitalist, GI    Disposition and Plan     Clinical Impression:  1. Uncontrolled type 1 diabetes mellitus with hypoglycemia without coma (HCC)    2. Rectal pain    3. Exacerbation of Crohn's disease of large intestine (HCC)       Disposition:  Admit  4/8/2025 11:07 pm     Rubina Powell MD on 4/9/2025 12:25 AM    History and Physical   Chief Complaint: rectal pain and bloody diarrhea    History of Present Illness:   40 year old female with medical history of Crohn's disease and DM type I who present to the ER with complaints of rectal pain .  She recently had a seton procedure done at [facility] and reported was doing well afterwards.  About 3 days ago she developed left lower abdominal pain and diarrhea which was bloody.  She reports her symptoms are similar to a crohn's flare. She denies a fever, chills, vomiting but was feeling very nauseated when her sugars were running high.  She came to the ER as her rectal pain was very severe.  She was noted to be hyperglycemia with an initial BS over 500 but not in DKA.       BP 96/52 (BP Location: Right arm)  Pulse 86  Temp 97.6 °F (36.4 °C) (Temporal)  Resp 16  Ht 5' 5\" (1.651 m)  Wt 140 lb (63.5 kg)  LMP 10/01/2024  SpO2 96%  BMI 23.30 kg/m²       Lab 04/08/25 2034   RBC 3.67*   HGB 11.4*    HCT 32.6*   MCV 88.8   MCH 31.1   MCHC 35.0   RDW 15.2   NEPRELIM 3.80   WBC 6.0   .0      *   BUN 11   CREATSERUM 0.85   EGFRCR 89   CA 9.4   ALB 4.4   *   K 4.8      CO2 21.0   ALKPHO 118*   AST 30   ALT 15   BILT <0.2*   TP 6.8       Assessment & Plan:  #Rectal pain   #Bloody diarrhea  #Crohn's disease  -recent seton surgery at [facility]  -GI consulted  -trend hemoglobin  -Flagyl  -CT negative for perirectal abscess  -pain control  -on remicaide OP     #Hyponatremia  -IVF     #DM type I  -hyperglycemic in ER, but was hypoglycemic on arrival to floors  -will start hyperglycemia protocol- decrease basal insulin and start in am  -carb counting and SSI       Gastroenterology Consultation   Reason for consultation: Rectal pain; hx of Crohn's   HPI: This is a 40 yr-old female with PMhx that includes uncontrolled DM (A1C 9.7) and Crohn's disease of the colon (dx 8 yrs ago; follows with her established general surgeon at [facility] who prescribes Remicade 7.5 mg/q 12 wks--last infusion 4 weeks ago) who carries a hx of hiwot-anal disease r/t hidradenitis suppurativa and more recently fistulas requiring Seton placement (reports 70+ surgeries at [facility]) who presented to ER yesterday evening with acute on chronic rectal pain and rectal discharge.   Patient reports feeling well after her evaluation at the [facility] until 2-3 days ago when she developed recurrent LLQ pain which progressed to loose stools.  Patient reports 3 watery stools yesterday mixed with streaks of red blood.  Prior to acute symptoms she reports daily BMs without overt bleeding.  She reports her chronic abdominal pain/rectal pain is treated with oral Dilaudid + Norco--unable to recall frequency of use.  Patient reports transient nausea yesterday however no vomiting, fever, or chills.  No known sick contacts, dietary indiscretions, changes in dietary patterns, recent ABX, or travel apart from going to [place].  Most  recent flexible sigmoidoscopy 9/2024 (Dr. Flower Diaz; [facility] [place]) for abdominal pain, diarrhea, blood in stool revealed normal left colon and retroflexion  CT A/P IV suggests moderate feces in the colon, no large or small bowel dilation, known seton in the anal rectal junction with no drainable fluid collection in this area; labs normal WBC, stable anemia (Hgb 10-11), marked hyperglycemia (glucose > 500 on arrival), normal kidney function, normal albumin  Since admission, no BMs or overt GI bleeding and she has tolerated clears without difficulty.  She continues to require frequent IV narcotics to help manage pain  Lab 04/08/25 2040 04/09/25  0523   * 178*   BUN 11 8*   CREATSERUM 0.85 0.54*   CA 9.4 8.9   * 139   K 4.8 3.6    107   CO2 21.0 26.0     Lab 04/09/25  0523   RBC 3.33*   HGB 10.1*   HCT 30.2*   MCV 90.7   MCH 30.3   MCHC 33.4   RDW 15.3   NEPRELIM 2.27   WBC 4.8   .0     Lab 04/08/25 2040   ALT 15   AST 30       Impression:   Will rule out acute infection, check inflammatory markers, and continue supportive care measures while starting laxatives for stool burden.  Will consult colorectal surgery given history of fistulas and continue supportive care measures.  Currently no role for IV steroids given no definitive IBD flare  Recommendations:   Stool for C. difficile, GI PCR panel, and fecal calprotectin; enteric isolation  MiraLAX 17 g p.o. twice daily to improve stool burden  CRP to blood in lab  No plan for steroids at this time given no inflammation noted on CT and markedly elevated blood sugars--will await CRP and fecal calprotectin  Clear liquid diet trial encouraging smaller more frequent amounts  Pain management per hospitalist recommendations limiting narcotics as able  Can consider outpatient gastric emptying scan with established GI if nausea persists; continue to optimize glucose control  Colorectal surgery consult given history of fistulas and current  seton  Monitor for overt GI bleeding--no need for occult stool testing  CBC in a.m. transfusing as needed to maintain Hgb greater than 7      General Surgery     Reason for Consultation:  Rectal pain, recurrent anal fistulas currently with a seton in place     Chief Complaint:  Rectal pain and concern for a Crohn's flare     History of Present Illness:  40 year old female who presents to [facility] on 4/8/2025 with complaints of severe rectal pain and an ongoing Crohn's flare.      Upon presentation to the hospital, CT scan of the abdomen and pelvis revealed a possible stent or suture noted in the region of the anorectal junction.  No drainable fluid collection is identified.  Unremarkable appendix.  Moderate feces in the colon.  No large or small bowel dilation.  No free air or significant free fluid.  All other significant findings may be seen in the radiologist report.     The patient is afebrile.  WBCs 4.8.  Hemoglobin 10.1.  Platelets 281,000.  Hyperglycemic (178).  Electrolytes are within normal limits.  CRP is within normal limits.     Past medical history significant for Crohn's disease and type 1 diabetes mellitus.     The patient has undergone numerous operations for her perianal fistulas, but has never had an abdominal operation.    /52 (BP Location: Right arm)  Pulse 68  Temp 98.5 °F (36.9 °C) (Oral)  Resp 16  Ht 65\"  Wt 143 lb 8 oz (65.1 kg)  SpO2 95%  BMI 23.88 kg/m²       Impression:  Crohn's disease  Anal fistula with seton in place   POD 5 anal exam under anesthesia with placement of seton  Rectal pain     Plan:  Colorectal surgeon Dr. Gibson to see her   Plan for symptomatic management of the patient's rectal pain  Appreciate GI recommendations regarding management of her Crohn's disease  Antiemetics and analgesics as needed  Activity as tolerated  DVT prophylaxis with SCDs-okay for anticoagulation from a general surgical standpoint    4/10/2025  General Surgery   continues to  have severe rectal pain.  Her pain is about the same today as it was yesterday.  She reports persistent left lower quadrant abdominal pain, but this is more tolerable.     She reports loose bloody bowel movements.  She reports increased drainage from the fistula.  She states the drainage is malodorous.     Objective/Physical Exam:  BP 97/65 (BP Location: Right arm)   Pulse 76   Temp 98.2 °F (36.8 °C) (Oral)   Resp 16   Ht 65\"   Wt 143 lb 8 oz (65.1 kg)   SpO2 93%   BMI 23.88 kg/m²       4/10/2025 0514      Gross per 24 hour   Intake 1848 ml   Output --   Net 1848 ml     Component Value Date     WBC 5.3 04/10/2025     HGB 10.6 04/10/2025     HCT 32.5 04/10/2025     .0 04/10/2025     Plan:  The patient's case was discussed with Dr. Gibson who is a colorectal specialist.  He will plan to take the patient to the operating room for an anal exam under anesthesia tomorrow.  The patient provided the cell phone number of her colorectal surgeon at [facility], Dr. Coy to discuss the patient's case  The patient may have a diet today from a general surgical standpoint  Antiemetics and analgesics as needed  Activity as tolerated  Appreciate GI recommendations  Medical management per primary  DVT prophylaxis with SCDs-hold lovenox due to bloody bowel movements  GI prophylaxis with protoix    4/11/2025  Operative Report   Preoperative Diagnosis: Rectal pain, anal fistula     Postoperative Diagnosis: Same as preoperative diagnosis    Procedures: Anal exam under anesthesia with anoscopy, completion fistulotomy    /56 (BP Location: Left arm)  Pulse 80  Temp 98.2 °F (36.8 °C) (Oral)  Resp 18  Ht 65\"  Wt 143 lb 8 oz (65.1 kg)  SpO2 95%  BMI 23.88 kg/m²     Component Value Date     WBC 6.6 04/11/2025     HGB 10.8 04/11/2025     HCT 32.3 04/11/2025     .0 04/11/2025     PLAN:  resume diet as tolerated.     maintain a bowel regimen.  She should continue sitz bath's and local wound care with  dry gauze dressing changes.    No indication for ongoing antibiotics from surgical standpoint.     MEDICATIONS ADMINISTERED:  Medications 04/08/25 04/09/25 04/10/25 04/11/25   acetaminophen (Tylenol Extra Strength) tab 1,000 mg  Dose: 1,000 mg  Freq: Every 8 hours Route: OR  Start: 04/11/25 0800       0903 MK-Given   1009 UE-MAR Hold     1337 KF-MAR Unhold   1600        glucose (Glutose) 40% oral gel  Start: 04/08/25 2351 End: 04/08/25 2350   Admin Instructions:   Navi García: cabinet override    2350 MM-Given            glucose-vitamin C (Dex-4) 4-6 g-mg chewable tab  Start: 04/09/25 0009 End: 04/09/25 0035   Admin Instructions:   Shelly Kirkpatrick: cabinet override     0015 MM-Given   0035 CP-Given          HYDROmorphone (Dilaudid) 1 MG/ML injection 1 mg  Dose: 1 mg  Freq: Once Route: IV  Start: 04/09/25 0145 End: 04/09/25 0145   Admin Instructions:   Use PRN reason as a guide and follow range order policy. If oral pain meds are ordered and patient can tolerate oral intake, start with PRN oral pain medications first.     0145 MM-Given           HYDROmorphone (Dilaudid) 1 MG/ML injection 1 mg  Dose: 1 mg  Freq: Once Route: IV  Start: 04/08/25 2241 End: 04/08/25 2256    2256 BR-Given            HYDROmorphone (Dilaudid) 1 MG/ML injection 1 mg  Dose: 1 mg  Freq: Once Route: IV  Start: 04/08/25 2139 End: 04/08/25 2141    2141 BR-Given            HYDROmorphone (Dilaudid) 1 MG/ML injection 1 mg  Dose: 1 mg  Freq: Once Route: IV  Start: 04/08/25 2058 End: 04/08/25 2110 2110 DZ-Given            insulin aspart (NovoLOG) 100 Units/mL FlexPen 1-10 Units  Dose: 1-10 Units  Freq: 3 times daily before meals and nightly Route: SC  Start: 04/09/25 0700   Admin Instructions:   Correction Insulin - calculate insulin requirement  Give 1 unit of Novolog (aspart) insulin for every 15 points blood glucose is greater than 140 mg/dL  **DO NOT HOLD OR ALTER INSULIN DOSE WITHOUT A PHYSICIAN ORDER**  Give Novolog/aspart insulin  subcutaneously within 30 minutes of scheduled finger-stick time  Notify physician for blood glucose >351mg/dL with time and last dose of correction insulin given.     (0700 MM)-Not Given   1230 TM-Given     1730 SF-Given [C]   2048 SV-Given      0512 SV-Given   1047 SS-Given [C]     1628 SS-Given   (2100 JJ)-Not Given      0540 JJ-Given [C]   1009 UE-MAR Hold     1100 UE   1337 KF-MAR Unhold     1600   2100        insulin aspart (NovoLOG) 100 Units/mL FlexPen 1-68 Units  Dose: 1-68 Units  Freq: 3 times daily with meals Route: SC  Start: 04/09/25 0800   Admin Instructions:   1 unit of Novolog (aspart) insulin for every 10 grams of carbohydrates eaten  Give within 10 minutes before or after a meal  Do not give if patient is NPO (carbohydrate is on hold)     (0918 TM)-Not Given   (1200 TM)-Not Given     (1728 SF)-Not Given       (0835 SS)-Not Given   1049 SS-Given     1628 SS-Given       (0800 MK)-Not Given   1009 UE-MAR Hold     1200 UE   1337 KF-MAR Unhold     1700         insulin aspart (NovoLOG) 100 Units/mL vial 13 Units  Dose: 0.2 Units/kg  Weight Dosing Info: 63.5 kg  Freq: Once Route: SC  Start: 04/08/25 2058 End: 04/08/25 2118   Admin Instructions:   Administer for blood glucose > 400 mg/dL    2118 DZ/BS-Given            insulin degludec (Tresiba) 100 units/mL flextouch 10 Units  Dose: 10 Units  Freq: Daily Route: SC  Start: 04/09/25 0100   Admin Instructions:   This is LONG ACTING insulin.  Continue to give basal insulin (insulin degludec - Tresiba) even if NPO.  DO NOT hold or alter insulin dose without a physician order.     0818 TM-Given       0917 SS-Given       0904 MK-Given   1009 UE-MAR Hold     1337 KF-MAR Unhold         metroNIDAZOLE in sodium chloride 0.79% (Flagyl) 5 mg/mL IVPB premix 500 mg  Dose: 500 mg  Freq: Every 12 hours Route: IV  Last Dose: 500 mg (04/11/25 0016)  Start: 04/09/25 1200 End: 04/11/25 1337   Order specific questions:        1259 TM-New Bag       0002 SV-New Bag   1238 SS-New  Bag      0016 JJ-New Bag   1050 SP-Given     1337-D/C'd       metroNIDAZOLE in sodium chloride 0.79% (Flagyl) 5 mg/mL IVPB premix 500 mg  Dose: 500 mg  Freq: Once Route: IV  Last Dose: 500 mg (04/09/25 0003)  Start: 04/08/25 2309 End: 04/09/25 0103   Order specific questions:        0003 MM-New Bag           pantoprazole (Protonix) 40 mg in sodium chloride 0.9% PF 10 mL IV push  Dose: 40 mg  Freq: Every 24 hours Route: IV  Start: 04/10/25 1000   Admin Instructions:   Dilute with 10 ml NS; IV push over 2 minutes      1035 SS-Given       0903 MK-Given   1009 UE-MAR Hold     1337 KF-MAR Unhold         polyethylene glycol (PEG 3350) (Miralax) 17 g oral packet 17 g  Dose: 17 g  Freq: 2 times daily Route: OR  Start: 04/09/25 2100   Admin Instructions:   1 packet=17gm=1 heaping tablespoon; Dissolve in 8 oz of water     2045 SV-Given       (0900 SS)-Not Given   2100 JJ-Given      (0900 MK)-Not Given   1009 UE-MAR Hold     1337 KF-MAR Unhold   2100        sodium chloride 0.9 % IV bolus 1,000 mL  Dose: 1,000 mL  Freq: Once Route: IV  Last Dose: Stopped (04/08/25 2210)  Start: 04/08/25 2011 End: 04/08/25 2210 2110 DZ-New Bag   2210 BR-Stopped             dextrose 5%-sodium chloride 0.9% infusion  Rate: 83 mL/hr  Freq: Continuous Route: IV  Start: 04/09/25 0445     0527 MM-New Bag       2012 JJ-New Bag       1002 MK-New Bag   1024 SP-Continued by Anesthesia     1137 SP-Stopped   1138 HC-Rate/Dose Change        lactated ringers infusion  Rate: 100 mL/hr  Freq: Continuous Route: IV  Start: 04/11/25 1200 End: 04/11/25 1308       1138 HC-Rate/Dose Change   1308-D/C'd      sodium chloride 0.9% infusion  Rate: 100 mL/hr  Freq: Continuous Route: IV  Start: 04/09/25 0115 End: 04/09/25 0436     0115 MM-New Bag   0436-D/C'd          Or   HYDROcodone-acetaminophen (Norco) 5-325 MG per tab 2 tablet  Dose: 2 tablet  Freq: Every 4 hours PRN Route: OR  PRN Reason: severe pain  Start: 04/09/25 0100 End: 04/11/25 0750   Admin Instructions:    Use PRN reason as a guide and follow range order policy     1730 SF-Given       0002 SV-Given       0750-D/C'd       ALPRAZolam (Xanax) tab 1 mg  Dose: 1 mg  Freq: Nightly PRN Route: OR  PRN Reason: Anxiety  Start: 04/10/25 2121      2148 JJ-Given       1009 UE-MAR Hold   1337 KF-MAR Unhold        bupivacaine 0.5%-EPINEPHrine 1:200,000 PF (Marcaine/Epinephrine PF) injection  Freq: As needed  Start: 04/11/25 1111 End: 04/11/25 1308       1111 JS-Given   1308-D/C'd       glucose (Dex4) 15 GM/59ML oral liquid 15 g  Dose: 15 g  Freq: Every 15 min PRN Route: OR  PRN Reason: Low blood glucose  PRN Comment: less than 70 mg/dL, OR blood glucose  mg/dL with symptoms of hypoglycemia  Start: 04/09/25 0059   Admin Instructions:   Use PRN reason as a guide and follow hypoglycemia policy     0402 MM-Given        1009 UE-MAR Hold   1337 KF-MAR Unhold        glucose (Glutose) 40% oral gel  Start: 04/08/25 2351 End: 04/08/25 2350   Admin Instructions:   Navi García: cabinet override    2350 MM-Given            glucose-vitamin C (Dex-4) 4-6 g-mg chewable tab  Start: 04/09/25 0009 End: 04/09/25 0035   Admin Instructions:   Shelly Kirkpatrick: cabinet override     0015 MM-Given   0035 CP-Given          Or   HYDROmorphone (Dilaudid) 1 MG/ML injection 2 mg  Dose: 2 mg  Freq: Every 2 hour PRN Route: IV  PRN Reason: severe pain  Start: 04/09/25 0753   Admin Instructions:   Use PRN reason as a guide and follow range order policy. If oral pain meds are ordered and patient can tolerate oral intake, start with PRN oral pain medications first.     0811 TM-Given   1018 TM-Given     1218 TM-Given   1419 SF-Given     1630 SF-Given   1833 SF-Given     2046 SV-Given   2248 SV-Given      0032 SV-Given   0222 SV-Given     0426 SV-Given   0648 CC-Given     0835 SS-Given   1037 SS-Given     1238 SS-Given   1430 EM-Given     1628 SS-Given   1837 SS-Given     2043 JJ-Given   2252 JJ-Given      0106 JJ-Given   0310 JJ-Given     0532 JJ-Given    0740 JJ-Given     0927 MK-Given   1009 UE-MAR Hold     1337 KF-MAR Unhold   1341 KF-Given        Or   HYDROmorphone (Dilaudid) 1 MG/ML injection 1 mg  Dose: 1 mg  Freq: Every 4 hours PRN Route: IV  PRN Reason: moderate pain  Start: 04/09/25 0329 End: 04/09/25 0752   Admin Instructions:   Use PRN reason as a guide and follow range order policy. If oral pain meds are ordered and patient can tolerate oral intake, start with PRN oral pain medications first.     0527 MM-Given   0752-D/C'd        iopamidol 76% ([id number]) injection for power injector  Dose: 75 mL  Freq: IMG once as needed Route: IV  PRN Reason: contrast  Start: 04/08/25 2137 End: 04/08/25 2206 2206 HM-Given            meperidine (Demerol) 25 MG/ML injection 12.5 mg  Dose: 12.5 mg  Freq: As needed Route: IV  PRN Reason: Shivering post op  Start: 04/11/25 1152 End: 04/11/25 1221   Admin Instructions:   Maximum of 2 doses   Order specific questions:          1155 HC-Given [C]   1221 HC-Given          Vitals \      Date/Time Temp Pulse Resp BP SpO2 Weight O2 Device O2 Flow Rate (L/min) [facility]    04/11/25 1335 98.4 °F (36.9 °C) 90 16 97/66 94 % -- None (Room air) --     04/11/25 1239 -- -- 11 -- -- -- -- --     04/11/25 1238 -- 85 -- 91/53 94 % -- -- --     04/11/25 1234 -- 88 10 97/54 93 % -- -- --     04/11/25 1223 -- 88 14 89/52 96 % -- -- --     04/11/25 1209 -- 88 10 91/52 96 % -- -- --     04/11/25 1153 -- 92 10 94/50 96 % -- -- --     04/11/25 1149 -- -- -- 89/37 -- -- -- --     04/11/25 1148 -- 92 10 -- 97 % -- -- --     04/11/25 1144 -- 95 10 99/46 96 % -- -- --     04/11/25 1142 97.3 °F (36.3 °C) 102 14 106/66 100 % -- -- --     04/11/25 1138 -- -- -- -- -- -- Simple mask --     04/11/25 0830 98.2 °F (36.8 °C) 80 18 100/56 -- -- None (Room air) --     04/11/25 0100 98.9 °F (37.2 °C) 80 16 101/62 95 % -- None (Room air) --     04/10/25 1945 99.8 °F (37.7 °C) 77 18 97/53 -- -- None (Room air) --     04/10/25 1255 98.8 °F  (37.1 °C) -- 16 -- -- -- -- -- EM    04/10/25 0929 98.2 °F (36.8 °C) -- 16 -- -- -- -- -- EM    04/10/25 0514 97.8 °F (36.6 °C) 76 16 97/65 93 % -- None (Room air) -- SF    04/10/25 0010 98.3 °F (36.8 °C) 72 18 101/57 99 % -- None (Room air) --      04/09/25 1929 99.4 °F (37.4 °C) 72 16 101/56 96 % -- None (Room air) --    04/09/25 1647 98.5 °F (36.9 °C) -- 16 100/52 -- -- None (Room air) -- JT   04/09/25 1647 -- 66 -- -- 96 % -- -- -- [facility]   04/09/25 1245 98.4 °F (36.9 °C) -- 16 93/60 -- -- None (Room air) -- T   04/09/25 1245 -- 74 -- -- 96 % -- -- -- [facility]   04/09/25 0734 98.7 °F (37.1 °C) -- 16 93/60 -- -- None (Room air) -- JT   04/09/25 0734 -- 68 -- -- 95 % -- -- -- [facility]   04/09/25 0400 98.8 °F (37.1 °C) 80 16 94/52 96 % -- None (Room air) --    04/09/25 0140 98.5 °F (36.9 °C) 80 -- -- 96 % 143 lb 8 oz (65.1 kg) -- -- SF   04/09/25 0000 -- 86 16 96/52 96 % -- None (Room air) -- MM   04/08/25 2315 -- 86 -- -- 100 % -- -- -- BR   04/08/25 2215 -- 73 -- 111/65 100 % -- None (Room air) -- BR   04/08/25 2115 -- 82 -- 101/84 100 % -- -- -- BR   04/08/25 1938 97.6 °F (36.4 °C) 102 16 119/80 98 % 140 lb (63.5 kg) None (Room air) --        FOR REVIEW/APPROVAL OF INPT ADMISSION

## 2025-04-11 NOTE — ANESTHESIA PROCEDURE NOTES
Airway  Date/Time: 4/11/2025 10:34 AM  Reason: elective    Airway not difficult    General Information and Staff   Patient location during procedure: OR  Anesthesiologist: Moose Perdomo MD  Resident/CRNA: Areli Hernandez CRNA  Performed: CRNA   Performed by: Areli Hernandez CRNA  Authorized by: Moose Perdomo MD        Indications and Patient Condition  Indications for airway management: anesthesia  Sedation level: deep      Preoxygenated: yesPatient position: sniffing    Mask difficulty assessment: 1 - vent by mask    Final Airway Details    Final airway type: endotracheal airway    Successful airway: ETT  Cuffed: yes   Successful intubation technique: direct laryngoscopy  Facilitating devices/methods: anterior pressure/BURP and intubating stylet  Endotracheal tube insertion site: oral  Blade: Chantel  Blade size: #3  ETT size (mm): 7.0    Cormack-Lehane Classification: grade I - full view of glottis  Placement verified by: capnometry   Measured from: lips  ETT to lips (cm): 21  Number of attempts at approach: 1

## 2025-04-11 NOTE — OPERATIVE REPORT
[facility]  Operative Note    Esperanza Mauro Location: OR   CSN [id number] MRN [id number]    1985 Age 40 year old   Admission Date 2025 Operation Date 2025   Attending Physician Wilson Narayan DO Operating Physician Eamon Gibson MD   PCP None Pcp          Patient Name: Esperanza Mauro    Preoperative Diagnosis: Rectal pain, anal fistula    Postoperative Diagnosis: Same as preoperative diagnosis    Primary Surgeon: Eamon Gibson MD    Assistant: Nilda VACA    Anesthesia: General    Procedures: Anal exam under anesthesia with anoscopy, completion fistulotomy    Implants: None    Specimen: None    Drains: None    Estimated Blood Loss: 2 cc    Complications: None immediate    Condition: Stable    Indications for Surgery:   Esperanza Mauro is a 40 year old female with a somewhat complicated medical and surgical history. I was asked to evaluate this patient from my partner, Dr. Hong, as a colorectal specialist.     Patient is a type I diabetic.  She carries a diagnosis of Crohn's disease and hidradenitis suppurativa.  She has undergone reportedly 70+ anorectal operations primarily performed by Dr. Eliceo Adam, colorectal surgeon at [facility] in [facility].  No prior abdominal surgery.  Patient has been tried on Remicade and Humira in the past without significant relief.  She is currently not on any Biologics.      Patient has been dealing with severe rectal and vaginal pain for around 2 months now associated with malodorous drainage from the rectum and vagina.  She states that bowel movements are extremely painful.  They are loose but not edenilson watery diarrhea.  She has had some bright red blood in her stool.       Most recently, patient was evaluated by colorectal surgeons at [facility].  She underwent an anal exam under anesthesia with anoscopy at [facility] on 4/3/2025.  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  A seton was placed  through this fistula tract.  Patient states she initially felt much better after this operation the first 4 days.  Unfortunately, patient developed recurrent, severe rectal pain prompting her to come to the [place] emergency department on 4/8/2025.     Patient continues to have severe rectal pain and complains of ongoing drainage from her rectum and vagina.  Patient states she has been prescribed narcotic pain medication after her many operations but does not take narcotic pain medication on a daily basis.  She has been on oral Dilaudid medication in the past and follows with a pain management specialist for this.  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     External exam of the anus reveals a yellow vessel loop seton through a fistula tract with external opening around 1 cm from the anal verge in the left posterior position.  The entire left side of the anoderm is tender without bleeding obvious induration, fluctuance or expressible drainage.  The right side of the anoderm has several small punctate scars without any drainage, tenderness or skin changes.  Patient was quite uncomfortable even during spreading of the anus.  Digital rectal exam and anoscopy deferred.     I personally reviewed the imaging of patient's CT scan of the abdomen and pelvis from 4/8/2025.  No evidence of a perirectal fluid collection or abscess.  I do see some possible mild thickening of the distal rectum with seton in place.     I recommend proceeding with an anal exam under anesthesia with anoscopy, possible incision and drainage of perirectal abscess, possible seton placement, possible biopsy.  The details of this procedure were discussed including the expected recovery time, risk, benefits and alternatives.  Specifically, I counseled patient that the purpose of this operation would be to be able to more thoroughly evaluate the patient's anal canal and distal rectum  in an attempt to understand the etiology of her severe, uncontrolled rectal pain, pelvic pain and drainage.  I will attempt to rule out rectovaginal fistula.  If I do encounter an abscess, I may need to drain this and possibly place more setons.  If the rectal mucosa is inflamed, I may take a biopsy.  I counseled patient that I cannot guarantee that this operation will immediately fix her pain or that I will necessarily find pathology to explain her pain.     Patient expressed understanding and was agreeable to proceed with surgery.  Consent was signed.  All questions answered.    Surgical Findings:   Extensive scarring along the left posterior anoderm.  The perianal skin and soft tissue appeared soft and healthy without any induration, fluctuance or expressible drainage.  There was a punctate skin opening in the right lateral anoderm 3 cm from the anal verge without any expressible drainage or surrounding signs of inflammation or infection.  There was another small scar in the right anterior anoderm 3 cm from the anal verge.  The anal rectal mucosa appeared pink, soft and healthy without any masses, lesions, internal openings or inflammatory changes.  There was a left posterior vessel loop seton in place through a subcutaneous anal fistula.  The external opening was 1 cm from the anal verge and the internal opening was distal to the dentate line just proximal the anal verge.  There was no more than 1 mm of superficial external anal sphincter fibers overlying the fistula tract.  No evidence of rectovaginal fistula on bimanual examination.    Description of Procedure:   Patient was brought to the operating room on the transport cart. Bilateral sequential compression devices were placed. Preoperative antibiotics were given.  Patient was induced under general endotracheal anesthesia.  Patient was then carefully flipped prone onto the OR table with all pressure points well-padded.  Patient was positioned in prone  jackknife with the buttocks retracted apart with silk tape.  The anus and perianal skin were prepped and draped in the usual sterile fashion.  A timeout was performed.    I began with external exam of the anus, digital rectal exam and anoscopy using a Barker bivalve anoscope.  I encountered the findings as described above.  Overall, there were no findings to explain the patient's severe rectal pain, vaginal pain and drainage.  I placed a fistula probe through the left posterior fistula tract and assessed the overlying muscle involvement.  This appeared to be a subcutaneous tract with no more than 1 mm of subcutaneous external anal sphincter fibers overlying the tract.  It is possible that the patient's pain could be worsened by the seton.  Because of this and the fact that there were no other findings to explain the patient's pain and there was minimal overlying sphincter muscle involvement, I decided to proceed with completion fistulotomy.    The fistulotomy was completed over the fistula probe using electrocautery.  Hemostasis was achieved within the wound base using electrocautery.  The wound base was curetted with a lap pad and irrigated.  The fistulotomy wound base was marsupialized using 2 interrupted 3-0 Vicryl sutures.  30 cc of 0.5% Marcaine with epinephrine was injected around the fistulotomy wound and anus for local anesthesia.  The skin was cleaned and dried and a dressing of 4 x 4 gauze, ABD pad and underwear was applied.    Patient was carefully flipped back supine onto the transport cart, awakened from anesthesia, extubated and transferred the postanesthesia care unit in stable condition.  All sponge, needle and instrument counts were correct at the end of the case.  I was present for the entire case.    I attempted to call the patient's  to update him on my surgical findings and plan of care.  The phone rang for over 1 minute with no answer and no opportunity to leave a voicemail.    Patient is  okay to be discharged from a surgical standpoint once rectal pain is controlled with oral pain medication.  Patient can resume diet as tolerated.  She should maintain a bowel regimen.  She should continue sitz bath's and local wound care with dry gauze dressing changes.  She can follow-up with [facility] general surgery [facility] clinic in about 2 weeks.  No indication for ongoing antibiotics from surgical standpoint.      Eamon Gibson MD  4/11/2025  11:26 AM

## 2025-04-11 NOTE — ANESTHESIA POSTPROCEDURE EVALUATION
[facility]    Esperanza Mauro Patient Status:  Inpatient   Age/Gender 40 year old female MRN [id number]   Location [facility] SURGERY Attending Wilson Narayan DO   Hosp Day # 3 PCP None Pcp       Anesthesia Post-op Note    ANAL EXAM UNDER ANESTHESIA, FISTULOTOMY    Procedure Summary       Date: 04/11/25 Room / Location:  MAIN OR 15 / [facility] MAIN OR    Anesthesia Start: 1024 Anesthesia Stop: 1137    Procedure: ANAL EXAM UNDER ANESTHESIA, FISTULOTOMY (Anus) Diagnosis: (ANAL FISTULA)    Surgeons: Eamon Gibson MD Anesthesiologist: Moose Perdomo MD    Anesthesia Type: general ASA Status: 3            Anesthesia Type: general    Vitals Value Taken Time   /66 04/11/25 11:42   Temp 97.3 °F (36.3 °C) 04/11/25 11:42   Pulse 102 04/11/25 11:42   Resp 14 04/11/25 11:42   SpO2 100 % 04/11/25 11:42           Patient Location: PACU    Anesthesia Type: general    Airway Patency: patent    Postop Pain Control: adequate    Mental Status: mildly sedated but able to meaningfully participate in the post-anesthesia evaluation    Nausea/Vomiting: none    Cardiopulmonary/Hydration status: stable euvolemic    Complications: no apparent anesthesia related complications    Postop vital signs: stable    Comments: Pt breathing comfortably, VSS, responding to commands. FSBG = 138. Report to RN.    Dental Exam: Unchanged from Preop    Patient to be discharged from PACU when criteria met.

## 2025-04-11 NOTE — PROGRESS NOTES
[facility]                       Gastroenterology Follow up Note - [facility]    Esperanza Mauro Patient Status:  Inpatient    1985 MRN [id number]   Location [facility] 4NW-A Attending Wilson Narayan,    Hosp Day # 3 PCP None Pcp     Reason for consultation: Rectal pain, history of Crohn's disease  Subjective: Patient seen and examined.  She underwent anorectal exam under anesthesia today without obvious evidence of drainage or abscess.  Cleared for discharge from surgical perspective.  She notes overall feeling okay post op.  Review of Systems:   10 point ROS completed and was negative, except for pertinent positive and negatives stated in subjective.    For PMH, PSH, FHx and SHx- please see initial consult note.     Objective: BP 97/66 (BP Location: Right arm)   Pulse 90   Temp 98.4 °F (36.9 °C) (Oral)   Resp 16   Ht 5' 5\" (1.651 m)   Wt 143 lb 8 oz (65.1 kg)   LMP 10/01/2024   SpO2 94%   BMI 23.88 kg/m²   Gen: No acute distress  Resp: no respiratory distress  Abd: Soft, non-tender, non-distended. No rebound tenderness, no guarding.   Neuro: Aox3.     Labs:   Lab Results   Component Value Date    WBC 6.6 2025    HGB 10.8 2025    HCT 32.3 2025    .0 2025     Recent Labs   Lab 25  0523   * 178*   BUN 11 8*   CREATSERUM 0.85 0.54*   CA 9.4 8.9   * 139   K 4.8 3.6    107   CO2 21.0 26.0     Recent Labs   Lab 04/10/25  0820 25  0629   RBC 3.52* 3.51*   HGB 10.6* 10.8*   HCT 32.5* 32.3*   MCV 92.3 92.0   MCH 30.1 30.8   MCHC 32.6 33.4   RDW 15.2 14.9   NEPRELIM 3.07  --    WBC 5.3 6.6   .0 291.0       Recent Labs   Lab 25   ALT 15   AST 30       Assessment:  Impression:  40 yr-old female with hx of uncontrolled DM (A1C 9.7) and Crohn's disease of the colon (dx 8 yrs ago; follows with her established general surgeon at [facility] who prescribes Remicade 7.5 mg/q 12 wks--last  infusion 4 weeks ago) who carries a hx of hiwot-anal disease r/t hidradenitis suppurativa and more recently fistulas requiring Seton placement (reports 70+ surgeries at [facility]) admitted with acute on chronic rectal pain and rectal discharge following anorectal exam under anesthesia 4/3 at [facility] for chronic anorectal pain which revealed a left posterolateral intersphincteric fistula s/p seton placement of tract + perianal + pudendal block was administered for pain control.  CT A/P IV suggests moderate feces in the colon, no large or small bowel dilation, known seton in the anal rectal junction with no drainable fluid collection in this area; labs normal WBC, stable anemia (Hgb 10-11), marked hyperglycemia (glucose > 500 on arrival), normal kidney function, normal albumin  Surgery exam under just anesthesia today with no evidence of abscess or fluctuance     Recommendations:      Follow-up calprotectin  Continue MiraLAX 17 g p.o. twice daily to improve stool burden and keep stools soft  No plan for steroids at this time given no inflammation noted on CT, normal CRP, and markedly elevated blood sugars  Okay to restart GI soft diet  Pain management per hospitalist recommendations limiting narcotics as able  Can consider outpatient gastric emptying scan with established GI if nausea persists; continue to optimize glucose control  Colorectal surgery following and appreciate recommendation.  Okay for discharge from their standpoint  Monitor for overt GI bleeding--no need for occult stool testing  If patient is tolerating diet and pain controlled, then she can be discharged today from GI perspective.  Recommend continue MiraLAX twice daily upon discharge to keep her stools soft.  Continue to follow-up with colorectal surgery.  Recommend continue to follow-up with her previously established GI, but if wanted to follow-up locally, can follow-up with NP or Dr. Garcia in 3 to 4 weeks.    Thank you for  allowing us to participate in patient's care. Please call us with any questions or concerns.    Johnny Cabrera DO  [facility]  [phone]

## 2025-04-11 NOTE — PLAN OF CARE
Pt is a&ox4, RA, VSS. C/o severe rectal pain, PRN dilaudid q2. Refused nightly insulin, MD aware. Morning BS was 369, MD notified, 10 units ordered. NPO at 0000. IVF infusing, all meds given per MAR. Pt resting in bed with call light in reach.     Problem: PAIN - ADULT  Goal: Verbalizes/displays adequate comfort level or patient's stated pain goal  Description: INTERVENTIONS:- Encourage pt to monitor pain and request assistance- Assess pain using appropriate pain scale- Administer analgesics based on type and severity of pain and evaluate response- Implement non-pharmacological measures as appropriate and evaluate response- Consider cultural and social influences on pain and pain management- Manage/alleviate anxiety- Utilize distraction and/or relaxation techniques- Monitor for opioid side effects- Notify MD/LIP if interventions unsuccessful or patient reports new pain- Anticipate increased pain with activity and pre-medicate as appropriate  Outcome: Progressing     Problem: METABOLIC/FLUID AND ELECTROLYTES - ADULT  Goal: Glucose maintained within prescribed range  Description: INTERVENTIONS:- Monitor Blood Glucose as ordered- Assess for signs and symptoms of hyperglycemia and hypoglycemia- Administer ordered medications to maintain glucose within target range- Assess barriers to adequate nutritional intake and initiate nutrition consult as needed- Instruct patient on self management of diabetes  Outcome: Progressing     Problem: HEMATOLOGIC - ADULT  Goal: Maintains hematologic stability  Description: INTERVENTIONS- Assess for signs and symptoms of bleeding or hemorrhage- Monitor labs and vital signs for trends- Administer supportive blood products/factors, fluids and medications as ordered and appropriate- Administer supportive blood products/factors as ordered and appropriate  Outcome: Progressing

## 2025-04-11 NOTE — ANESTHESIA PREPROCEDURE EVALUATION
PRE-OP EVALUATION    Patient Name: Esperanza Mauro    Admit Diagnosis: Rectal pain [K62.89]  Exacerbation of Crohn's disease of large intestine (HCC) [K50.10]  Uncontrolled type 1 diabetes mellitus with hypoglycemia without coma (HCC) [E10.649]    Pre-op Diagnosis: ANAL FISTULA    ANAL EXAM UNDER ANESTHESIA    Anesthesia Procedure: ANAL EXAM UNDER ANESTHESIA    Surgeons and Role:     * Eamon Gibson MD - Primary    Pre-op vitals reviewed.  Temp: 98.2 °F (36.8 °C)  Pulse: 80  Resp: 18  BP: 100/56  SpO2: 95 %  Body mass index is 23.88 kg/m².    Current medications reviewed.  Hospital Medications:  Current Medications[1]    Outpatient Medications:   Prescriptions Prior to Admission[2]    Allergies: Vancomycin, Morphine, and Toradol [ketorolac tromethamine]      Anesthesia Evaluation    Patient summary reviewed.    Anesthetic Complications  (-) history of anesthetic complications         GI/Hepatic/Renal                        (+) Crohn's disease         Cardiovascular    Negative cardiovascular ROS.    Exercise tolerance: good     MET: >4                                           Endo/Other      (+) diabetes and poorly controlled, type 1, using insulin      (+) anemia                   Pulmonary    Negative pulmonary ROS.                       Neuro/Psych    Negative neuro/psych ROS.                              Past Surgical History[3]  Social Hx on file[4]  History   Drug Use Unknown     Available pre-op labs reviewed.  Lab Results   Component Value Date    WBC 6.6 04/11/2025    RBC 3.51 (L) 04/11/2025    HGB 10.8 (L) 04/11/2025    HCT 32.3 (L) 04/11/2025    MCV 92.0 04/11/2025    MCH 30.8 04/11/2025    MCHC 33.4 04/11/2025    RDW 14.9 04/11/2025    .0 04/11/2025     Lab Results   Component Value Date     04/09/2025    K 3.6 04/09/2025     04/09/2025    CO2 26.0 04/09/2025    BUN 8 (L) 04/09/2025    CREATSERUM 0.54 (L) 04/09/2025     (H) 04/09/2025    CA 8.9 04/09/2025             Airway      Mallampati: II  Mouth opening: 3 FB  TM distance: 4 - 6 cm  Neck ROM: full Cardiovascular    Cardiovascular exam normal.  Rhythm: regular  Rate: normal  (-) murmur   Dental    Dentition appears grossly intact         Pulmonary    Pulmonary exam normal.  Breath sounds clear to auscultation bilaterally.               Other findings        ASA: 3   Plan: general  NPO status verified and patient meets guidelines.    Post-procedure pain management plan discussed with surgeon and patient.      Plan/risks discussed with: patient            Present on Admission:  **None**               [1]  • oxyCODONE immediate release tab 5 mg  5 mg Oral Q4H PRN   • acetaminophen (Tylenol Extra Strength) tab 1,000 mg  1,000 mg Oral Q8H   • pantoprazole (Protonix) 40 mg in sodium chloride 0.9% PF 10 mL IV push  40 mg Intravenous Q24H   • ALPRAZolam (Xanax) tab 1 mg  1 mg Oral Nightly PRN   • [COMPLETED] glucose-vitamin C (Dex-4) 4-6 g-mg chewable tab       • escitalopram (Lexapro) tab 20 mg  20 mg Oral Daily   • glucose (Dex4) 15 GM/59ML oral liquid 15 g  15 g Oral Q15 Min PRN    Or   • glucose (Glutose) 40% oral gel 15 g  15 g Oral Q15 Min PRN    Or   • glucose-vitamin C (Dex-4) chewable tab 4 tablet  4 tablet Oral Q15 Min PRN    Or   • dextrose 50% injection 50 mL  50 mL Intravenous Q15 Min PRN    Or   • glucose (Dex4) 15 GM/59ML oral liquid 30 g  30 g Oral Q15 Min PRN    Or   • glucose (Glutose) 40% oral gel 30 g  30 g Oral Q15 Min PRN    Or   • glucose-vitamin C (Dex-4) chewable tab 8 tablet  8 tablet Oral Q15 Min PRN   • melatonin tab 3 mg  3 mg Oral Nightly PRN   • ondansetron (Zofran) 4 MG/2ML injection 4 mg  4 mg Intravenous Q6H PRN   • prochlorperazine (Compazine) 10 MG/2ML injection 5 mg  5 mg Intravenous Q8H PRN   • insulin degludec (Tresiba) 100 units/mL flextouch 10 Units  10 Units Subcutaneous Daily   • insulin aspart (NovoLOG) 100 Units/mL FlexPen 1-68 Units  1-68 Units Subcutaneous TID CC   • insulin  aspart (NovoLOG) 100 Units/mL FlexPen 1-10 Units  1-10 Units Subcutaneous TID AC and HS   • metroNIDAZOLE in sodium chloride 0.79% (Flagyl) 5 mg/mL IVPB premix 500 mg  500 mg Intravenous Q12H   • [COMPLETED] HYDROmorphone (Dilaudid) 1 MG/ML injection 1 mg  1 mg Intravenous Once   • dextrose 5%-sodium chloride 0.9% infusion   Intravenous Continuous   • HYDROmorphone (Dilaudid) 1 MG/ML injection 0.5 mg  0.5 mg Intravenous Q2H PRN    Or   • HYDROmorphone (Dilaudid) 1 MG/ML injection 1 mg  1 mg Intravenous Q2H PRN    Or   • HYDROmorphone (Dilaudid) 1 MG/ML injection 2 mg  2 mg Intravenous Q2H PRN   • polyethylene glycol (PEG 3350) (Miralax) 17 g oral packet 17 g  17 g Oral BID   • [COMPLETED] sodium chloride 0.9 % IV bolus 1,000 mL  1,000 mL Intravenous Once   • ondansetron (Zofran) 4 MG/2ML injection 4 mg  4 mg Intravenous Once   • [COMPLETED] HYDROmorphone (Dilaudid) 1 MG/ML injection 1 mg  1 mg Intravenous Once   • [COMPLETED] insulin aspart (NovoLOG) 100 Units/mL vial 13 Units  0.2 Units/kg Subcutaneous Once   • [COMPLETED] iopamidol 76% (ISOVUE-370) injection for power injector  75 mL Intravenous ONCE PRN   • [COMPLETED] HYDROmorphone (Dilaudid) 1 MG/ML injection 1 mg  1 mg Intravenous Once   • [COMPLETED] HYDROmorphone (Dilaudid) 1 MG/ML injection 1 mg  1 mg Intravenous Once   • [COMPLETED] metroNIDAZOLE in sodium chloride 0.79% (Flagyl) 5 mg/mL IVPB premix 500 mg  500 mg Intravenous Once   • [COMPLETED] glucose (Glutose) 40% oral gel       [2]  Medications Prior to Admission   Medication Sig Dispense Refill Last Dose/Taking   • HYDROmorphone 4 MG Oral Tab Take 1 tablet (4 mg total) by mouth every 4 (four) hours as needed for Pain.   Past Week   • escitalopram 20 MG Oral Tab Take 1 tablet (20 mg total) by mouth daily.   4/8/2025   • inFLIXimab (REMICADE IV)    Past Month   • TRESIBA FLEXTOUCH 100 UNIT/ML Subcutaneous Solution Pen-injector Inject 40 Units into the skin daily. Prime 2 units before each dose (Patient  taking differently: Inject 15 Units into the skin 2 (two) times daily. Prime 2 units before each dose)   4/7/2025   • HUMALOG KWIKPEN 100 UNIT/ML Subcutaneous Solution Pen-injector Inject 8-14 Units into the skin 3 (three) times daily with meals. Plus sliding scale. Carb ratio 1-6 grams. Correction 1-30. Total 50 units daily. (Patient taking differently: Inject 3-6 Units into the skin 3 (three) times daily with meals. Per sliding scale)   4/7/2025   • Amphetamine-Dextroamphet ER 20 MG Oral Capsule SR 24 Hr Take 1 capsule (20 mg total) by mouth every morning. (Patient taking differently: Take 1 capsule (20 mg total) by mouth daily as needed.)   Unknown   • amphetamine-dextroamphetamine 10 MG Oral Tab Take 1 tablet (10 mg total) by mouth daily. (Patient taking differently: Take 1 tablet (10 mg total) by mouth daily as needed.)   Unknown   • PROMETHEGAN 25 MG Rectal Suppos Place 1 suppository (25 mg total) rectally every 6 (six) hours as needed.   Unknown   [3]  Past Surgical History:  Procedure Laterality Date   • I&d deep absc neck/chst+rib cut     • Other surgical history  04/02/2025    [facility]. rectal fistula repair   [4]  Social History  Socioeconomic History   • Marital status:    Tobacco Use   • Smoking status: Never   • Smokeless tobacco: Never   Vaping Use   • Vaping status: Never Used   Substance and Sexual Activity   • Alcohol use: Not Currently   • Drug use: Never

## 2025-04-11 NOTE — PLAN OF CARE
Pt Aox4. VSS. RA.   No complain of N/V/D.   Reported rectal pain . Dilaudid 2 mg PRN administered.     Surgical procedure done today.   Pt back to the room.   Up voiding. No BM.   Pt reports the pain is less than pre procedure but still requires Dilaudid 2mg PRN.     Diet advanced to Soft/low fiber.  Tolerating well. Fluids discontinued.   Pt hyperglycemic. MD notified. Insulin administered per order and pt request.     Up independent.  Call light within reach.     Problem: PAIN - ADULT  Goal: Verbalizes/displays adequate comfort level or patient's stated pain goal  Description: INTERVENTIONS:- Encourage pt to monitor pain and request assistance- Assess pain using appropriate pain scale- Administer analgesics based on type and severity of pain and evaluate response- Implement non-pharmacological measures as appropriate and evaluate response- Consider cultural and social influences on pain and pain management- Manage/alleviate anxiety- Utilize distraction and/or relaxation techniques- Monitor for opioid side effects- Notify MD/LIP if interventions unsuccessful or patient reports new pain- Anticipate increased pain with activity and pre-medicate as appropriate  Outcome: Progressing

## 2025-04-12 VITALS
TEMPERATURE: 99 F | WEIGHT: 143.5 LBS | HEART RATE: 79 BPM | SYSTOLIC BLOOD PRESSURE: 95 MMHG | RESPIRATION RATE: 16 BRPM | HEIGHT: 65 IN | OXYGEN SATURATION: 95 % | BODY MASS INDEX: 23.91 KG/M2 | DIASTOLIC BLOOD PRESSURE: 63 MMHG

## 2025-04-12 LAB
ANION GAP SERPL CALC-SCNC: 8 MMOL/L (ref 0–18)
BUN BLD-MCNC: 9 MG/DL (ref 9–23)
CALCIUM BLD-MCNC: 9.8 MG/DL (ref 8.7–10.6)
CHLORIDE SERPL-SCNC: 101 MMOL/L (ref 98–112)
CO2 SERPL-SCNC: 26 MMOL/L (ref 21–32)
CREAT BLD-MCNC: 0.7 MG/DL (ref 0.55–1.02)
EGFRCR SERPLBLD CKD-EPI 2021: 112 ML/MIN/1.73M2 (ref 60–?)
ERYTHROCYTE [DISTWIDTH] IN BLOOD BY AUTOMATED COUNT: 15.1 %
GLUCOSE BLD-MCNC: 338 MG/DL (ref 70–99)
GLUCOSE BLD-MCNC: 353 MG/DL (ref 70–99)
HCT VFR BLD AUTO: 31.9 % (ref 35–48)
HGB BLD-MCNC: 10.9 G/DL (ref 12–16)
MCH RBC QN AUTO: 31.1 PG (ref 26–34)
MCHC RBC AUTO-ENTMCNC: 34.2 G/DL (ref 31–37)
MCV RBC AUTO: 91.1 FL (ref 80–100)
OSMOLALITY SERPL CALC.SUM OF ELEC: 293 MOSM/KG (ref 275–295)
PLATELET # BLD AUTO: 300 10(3)UL (ref 150–450)
POTASSIUM SERPL-SCNC: 4.1 MMOL/L (ref 3.5–5.1)
RBC # BLD AUTO: 3.5 X10(6)UL (ref 3.8–5.3)
SODIUM SERPL-SCNC: 135 MMOL/L (ref 136–145)
WBC # BLD AUTO: 10.4 X10(3) UL (ref 4–11)

## 2025-04-12 PROCEDURE — 99239 HOSP IP/OBS DSCHRG MGMT >30: CPT | Performed by: HOSPITALIST

## 2025-04-12 RX ORDER — HYDROMORPHONE HYDROCHLORIDE 1 MG/ML
2 INJECTION, SOLUTION INTRAMUSCULAR; INTRAVENOUS; SUBCUTANEOUS ONCE
Refills: 0 | Status: COMPLETED | OUTPATIENT
Start: 2025-04-12 | End: 2025-04-12

## 2025-04-12 RX ORDER — HYDROCODONE BITARTRATE AND ACETAMINOPHEN 10; 325 MG/1; MG/1
1-2 TABLET ORAL EVERY 4 HOURS PRN
Qty: 10 TABLET | Refills: 0 | Status: SHIPPED | OUTPATIENT
Start: 2025-04-12 | End: 2025-04-23 | Stop reason: CLARIF

## 2025-04-12 NOTE — PLAN OF CARE
Patient is alert and orientated, VSS, RA, afebrile and complains of 7/10 pain relieved with IV Dilaudid. Patient appetite is adequate. All meds given. Patient is independent and ambulatory. Plan for discharge. Call light and safety precautions in place.    Problem: PAIN - ADULT  Goal: Verbalizes/displays adequate comfort level or patient's stated pain goal  Description: INTERVENTIONS:- Encourage pt to monitor pain and request assistance- Assess pain using appropriate pain scale- Administer analgesics based on type and severity of pain and evaluate response- Implement non-pharmacological measures as appropriate and evaluate response- Consider cultural and social influences on pain and pain management- Manage/alleviate anxiety- Utilize distraction and/or relaxation techniques- Monitor for opioid side effects- Notify MD/LIP if interventions unsuccessful or patient reports new pain- Anticipate increased pain with activity and pre-medicate as appropriate  4/12/2025 1303 by Natty Gonsales RN  Outcome: Progressing  4/12/2025 1303 by Natty Gonsales RN  Outcome: Progressing     Problem: METABOLIC/FLUID AND ELECTROLYTES - ADULT  Goal: Glucose maintained within prescribed range  Description: INTERVENTIONS:- Monitor Blood Glucose as ordered- Assess for signs and symptoms of hyperglycemia and hypoglycemia- Administer ordered medications to maintain glucose within target range- Assess barriers to adequate nutritional intake and initiate nutrition consult as needed- Instruct patient on self management of diabetes  4/12/2025 1303 by Natty Gonsales RN  Outcome: Progressing  4/12/2025 1303 by Natty Gonsales, RN  Outcome: Progressing     Problem: HEMATOLOGIC - ADULT  Goal: Maintains hematologic stability  Description: INTERVENTIONS- Assess for signs and symptoms of bleeding or hemorrhage- Monitor labs and vital signs for trends- Administer supportive blood products/factors, fluids and medications  as ordered and appropriate- Administer supportive blood products/factors as ordered and appropriate  4/12/2025 1303 by Natty Gonsales, RN  Outcome: Progressing  4/12/2025 1303 by Natty Gonsales, RN  Outcome: Progressing

## 2025-04-12 NOTE — PROGRESS NOTES
NURSING DISCHARGE NOTE    Discharged Home via Ambulatory.  Accompanied by RN  Belongings Taken by patient/family.    Removed IV and patient tolerated it well. Discharge papers were given and all questions answered. All belongings were taken.

## 2025-04-12 NOTE — PROGRESS NOTES
Pt A&Ox4 on room air, complaints of pain from surgery, needing Q2hr diludid 2 mg. VSS. Tolerating medications well per mar. Call light within reach, frequent checks made, needs met.

## 2025-04-12 NOTE — PLAN OF CARE
Problem: PAIN - ADULT  Goal: Verbalizes/displays adequate comfort level or patient's stated pain goal  Description: INTERVENTIONS:- Encourage pt to monitor pain and request assistance- Assess pain using appropriate pain scale- Administer analgesics based on type and severity of pain and evaluate response- Implement non-pharmacological measures as appropriate and evaluate response- Consider cultural and social influences on pain and pain management- Manage/alleviate anxiety- Utilize distraction and/or relaxation techniques- Monitor for opioid side effects- Notify MD/LIP if interventions unsuccessful or patient reports new pain- Anticipate increased pain with activity and pre-medicate as appropriate  Outcome: Progressing     Problem: METABOLIC/FLUID AND ELECTROLYTES - ADULT  Goal: Glucose maintained within prescribed range  Description: INTERVENTIONS:- Monitor Blood Glucose as ordered- Assess for signs and symptoms of hyperglycemia and hypoglycemia- Administer ordered medications to maintain glucose within target range- Assess barriers to adequate nutritional intake and initiate nutrition consult as needed- Instruct patient on self management of diabetes  Outcome: Progressing     Problem: HEMATOLOGIC - ADULT  Goal: Maintains hematologic stability  Description: INTERVENTIONS- Assess for signs and symptoms of bleeding or hemorrhage- Monitor labs and vital signs for trends- Administer supportive blood products/factors, fluids and medications as ordered and appropriate- Administer supportive blood products/factors as ordered and appropriate  Outcome: Progressing

## 2025-04-12 NOTE — DISCHARGE SUMMARY
[facility]     Esperanza Mauro Patient Status:  Inpatient    1985 MRN [id number]   Location [facility] 4NW-A Attending No att. providers found   Hosp Day # 4 PCP None Pcp     Date of Admission: 2025  Date of Discharge:  2025     Discharge Disposition: Home or Self Care    Discharge Diagnosis:  #Rectal pain   #Bloody diarrhea  #Crohn's disease  -recent seton surgery at [facility]  -GI eval noted  -Gen sx eval noted; discussed w/ Dr Hong; plan to go to OR w/ colorectal surgeron Dr. Gibson later today  -Flagyl  -CT negative for perirectal abscess  -pain control w/ q2h prn iv dilaudid   -on remicaide OP     #Pseudohyponatremia, improving   -cont IVF     #DM type I  -A1c 9.7 as of 2025  -PTA: 30u tresiba, currently 10u + ISS, pt npo  -carb counting and SSI       History of Present Illness: Esperanza Mauro is a 40 year old female with medical history of Crohn's disease and DM type I who present to the ER with complaints of rectal pain .  She recently had a seton procedure done at [facility] and reported was doing well afterwards.  About 3 days ago she developed left lower abdominal pain and diarrhea which was bloody.  She reports her symptoms are similar to a crohn's flare. She denies a fever, chills, vomiting but was feeling very nauseated when her sugars were running high.  She came to the ER as her rectal pain was very severe.  She was noted to be hyperglycemia with an initial BS over 500 but not in DKA.        Brief Synopsis: Patient admitted for severe rectal pain and concern of bloody diarrhea.  From a GI standpoint patient has history of Crohn's and was seen by their service.  Patient had negative inflammatory workup with negative CRP/ESR.  Patient also had seen general surgery initially and then saw colorectal surgery who took patient to the operating room for anoscopy and completion fistulotomy.  Patient tolerated the procedure well.  Patient  likely has a component of functional pain.  At this point patient agreed to discharge and does not need antibiotics per general surgery.  Patient counseled heavily on monitoring blood sugars.    Lace+ Score: 33  59-90 High Risk  29-58 Medium Risk  0-28   Low Risk  Patient was referred to the [facility].    TCM Follow-Up Recommendation:  LACE 29-58: Moderate Risk of readmission after discharge from the hospital.      Procedures during hospitalization:     4/12/2025  Preoperative Diagnosis: Rectal pain, anal fistula   Postoperative Diagnosis: Same as preoperative diagnosis   Primary Surgeon: Eamon Gibson MD   Assistant: Nilda VACA   Anesthesia: General   Procedures: Anal exam under anesthesia with anoscopy, completion fistulotomy    Incidental or significant findings and recommendations (brief descriptions):      Lab/Test results pending at Discharge:       Consultants:  Gen sx & GI    Discharge Medication List:     Discharge Medications        START taking these medications        Instructions Prescription details   HYDROcodone-acetaminophen  MG Tabs  Commonly known as: Norco      Take 1-2 tablets by mouth every 4 (four) hours as needed for Pain.   Quantity: 10 tablet  Refills: 0     Naloxone HCl 4 MG/0.1ML Liqd      4 mg by Nasal route as needed. If patient remains unresponsive, repeat dose in other nostril 2-5 minutes after first dose.   Quantity: 1 kit  Refills: 0     polyethylene glycol (PEG 3350) 17 g Pack  Commonly known as: Miralax      Take 17 g by mouth daily.   Quantity: 510 g  Refills: 2            CHANGE how you take these medications        Instructions Prescription details   HumaLOG KwikPen 100 UNIT/ML Sopn  Generic drug: Insulin Lispro (1 Unit Dial)  What changed: See the new instructions.      Inject 8-14 Units into the skin 3 (three) times daily with meals. Plus sliding scale. Carb ratio 1-6 grams. Correction 1-30. Total 50 units daily.   Refills: 0             CONTINUE taking these medications        Instructions Prescription details   Amphetamine-Dextroamphet ER 20 MG Cp24  Commonly known as: ADDERALL XR      Take 1 capsule (20 mg total) by mouth every morning.   Refills: 0     amphetamine-dextroamphetamine 10 MG Tabs  Commonly known as: Adderall      Take 1 tablet (10 mg total) by mouth daily.   Refills: 0     escitalopram 20 MG Tabs  Commonly known as: Lexapro      Take 1 tablet (20 mg total) by mouth daily.   Refills: 0     HYDROmorphone 4 MG Tabs  Commonly known as: Dilaudid      Take 1 tablet (4 mg total) by mouth every 4 (four) hours as needed for Pain.   Refills: 0     Promethegan 25 MG Supp  Generic drug: promethazine      Place 1 suppository (25 mg total) rectally every 6 (six) hours as needed.   Refills: 0     REMICADE IV       Refills: 0     Tresiba FlexTouch 100 UNIT/ML Sopn  Generic drug: insulin degludec      Inject 40 Units into the skin daily. Prime 2 units before each dose   Refills: 0               Where to Get Your Medications        These medications were sent to [facility]/pharmacy #[id number] - [place], [place] - [address] [phone], [phone]  [address], Former[facility] [address]      Phone: [phone]   HYDROcodone-acetaminophen  MG Tabs  Naloxone HCl 4 MG/0.1ML Liqd  polyethylene glycol (PEG 3350) 17 g Pack         [place] reviewed:     Follow-up appointment:   [facility]  [address] [id number] [phone]    Go on 4/25/2025  at 11:00am    Eamon Gibson MD  [address] [facility] [id number] [phone]    Go on 5/15/2025  at 8:45am    Belgica Garcia DO  [address] [id number] [phone]    Follow up in 3 week(s)  As needed. Recommend continue MiraLAX twice daily upon discharge to keep stools soft. Continue to follow-up with colorectal surgery. Recommend continue to follow-up with her previously established GI, but if wanted to follow-up locally, can follow-up with NP  or Dr. Garcia in 3 to 4 weeks.    Appointments for Next 30 Days 2025 - 2025        Date Arrival Time Visit Type Length Department Provider     2025 11:00 AM  [facility]2853[facility]    Patient Instructions:         Location Instructions:     Masks are optional for all patients and visitors, unless otherwise indicated. Note: A $50 fee will be charged for missed appointments or same-day cancellations. Please provide 24 hours' notice if you need to cancel or reschedule your appointment.                      Vital signs:  Temp:  [97.9 °F (36.6 °C)-99.2 °F (37.3 °C)] 98.8 °F (37.1 °C)  Pulse:  [72-93] 79  Resp:  [16] 16  BP: ()/(52-63) 95/63  SpO2:  [94 %-97 %] 95 %    Physical Exam:    General: No acute distress   Lungs: clear to auscultation  Cardiovascular: S1, S2  Abdomen: Soft      -----------------------------------------------------------------------------------------------  PATIENT DISCHARGE INSTRUCTIONS: See electronic chart    Wilson Narayan,     Total time spent on discharge plannin minutes     The  [employer] Cures Act makes medical notes like these available to patients in the interest of transparency. Please be advised this is a medical document. Medical documents are intended to carry relevant information, facts as evident, and the clinical opinion of the practitioner. The medical note is intended as peer to peer communication and may appear blunt or direct. It is written in medical language and may contain abbreviations or verbiage that are unfamiliar.

## 2025-04-13 LAB — CALPROTECTIN STL-MCNT: 60.3 ΜG/G (ref ?–50)

## 2025-04-14 ENCOUNTER — PATIENT OUTREACH (OUTPATIENT)
Dept: CASE MANAGEMENT | Age: 40
End: 2025-04-14

## 2025-04-14 NOTE — PAYOR COMM NOTE
--------------  DISCHARGE REVIEW    Payor: [employer] [facility] PPO  Subscriber #:  [id number]  Authorization Number: [id number]    Admit date: 25  Admit time:  11:44 PM  Discharge Date: 2025  1:14 PM     Admitting Physician: Katina Ambrosio MD  Attending Physician:  No att. providers found  Primary Care Physician: Pcp, None          Discharge Summary Notes        Discharge Summary signed by Wilson Narayan DO at 2025  2:56 PM       Author: Wilson Narayan DO Specialty: HOSPITALIST Author Type: Physician    Filed: 2025  2:56 PM Date of Service: 2025  2:53 PM Status: Signed    : Wilson Narayan DO (Physician)           [facility]     Esperanza Mauro Patient Status:  Inpatient    1985 MRN [id number]   Location [facility] 4NW-A Attending No att. providers found   Hosp Day # 4 PCP None Pcp     Date of Admission: 2025  Date of Discharge:  2025     Discharge Disposition: Home or Self Care    Discharge Diagnosis:  #Rectal pain   #Bloody diarrhea  #Crohn's disease  -recent seton surgery at [facility]  -GI eval noted  -Gen sx eval noted; discussed w/ Dr Hong; plan to go to OR w/ colorectal surgeron Dr. Gibson later today  -Flagyl  -CT negative for perirectal abscess  -pain control w/ q2h prn iv dilaudid   -on remicaide OP     #Pseudohyponatremia, improving   -cont IVF     #DM type I  -A1c 9.7 as of 2025  -PTA: 30u tresiba, currently 10u + ISS, pt npo  -carb counting and SSI       History of Present Illness: Esperanza Mauro is a 40 year old female with medical history of Crohn's disease and DM type I who present to the ER with complaints of rectal pain .  She recently had a seton procedure done at [facility] and reported was doing well afterwards.  About 3 days ago she developed left lower abdominal pain and diarrhea which was bloody.  She reports her symptoms are similar to a crohn's flare. She denies a fever, chills, vomiting but  was feeling very nauseated when her sugars were running high.  She came to the ER as her rectal pain was very severe.  She was noted to be hyperglycemia with an initial BS over 500 but not in DKA.        Brief Synopsis: Patient admitted for severe rectal pain and concern of bloody diarrhea.  From a GI standpoint patient has history of Crohn's and was seen by their service.  Patient had negative inflammatory workup with negative CRP/ESR.  Patient also had seen general surgery initially and then saw colorectal surgery who took patient to the operating room for anoscopy and completion fistulotomy.  Patient tolerated the procedure well.  Patient likely has a component of functional pain.  At this point patient agreed to discharge and does not need antibiotics per general surgery.  Patient counseled heavily on monitoring blood sugars.    Lace+ Score: 33  59-90 High Risk  29-58 Medium Risk  0-28   Low Risk  Patient was referred to the [facility].    [facility] Follow-Up Recommendation:  LACE 29-58: Moderate Risk of readmission after discharge from the hospital.      Procedures during hospitalization:     4/12/2025  Preoperative Diagnosis: Rectal pain, anal fistula   Postoperative Diagnosis: Same as preoperative diagnosis   Primary Surgeon: Eamon Gibson MD   Assistant: Nilda VACA   Anesthesia: General   Procedures: Anal exam under anesthesia with anoscopy, completion fistulotomy    Incidental or significant findings and recommendations (brief descriptions):      Lab/Test results pending at Discharge:       Consultants:  Gen sx & GI    Discharge Medication List:     Discharge Medications        START taking these medications        Instructions Prescription details   HYDROcodone-acetaminophen  MG Tabs  Commonly known as: Norco      Take 1-2 tablets by mouth every 4 (four) hours as needed for Pain.   Quantity: 10 tablet  Refills: 0     Naloxone HCl 4 MG/0.1ML Liqd      4 mg by Nasal route as needed.  If patient remains unresponsive, repeat dose in other nostril 2-5 minutes after first dose.   Quantity: 1 kit  Refills: 0     polyethylene glycol (PEG 3350) 17 g Pack  Commonly known as: Miralax      Take 17 g by mouth daily.   Quantity: 510 g  Refills: 2            CHANGE how you take these medications        Instructions Prescription details   HumaLOG KwikPen 100 UNIT/ML Sopn  Generic drug: Insulin Lispro (1 Unit Dial)  What changed: See the new instructions.      Inject 8-14 Units into the skin 3 (three) times daily with meals. Plus sliding scale. Carb ratio 1-6 grams. Correction 1-30. Total 50 units daily.   Refills: 0            CONTINUE taking these medications        Instructions Prescription details   Amphetamine-Dextroamphet ER 20 MG Cp24  Commonly known as: ADDERALL XR      Take 1 capsule (20 mg total) by mouth every morning.   Refills: 0     amphetamine-dextroamphetamine 10 MG Tabs  Commonly known as: Adderall      Take 1 tablet (10 mg total) by mouth daily.   Refills: 0     escitalopram 20 MG Tabs  Commonly known as: Lexapro      Take 1 tablet (20 mg total) by mouth daily.   Refills: 0     HYDROmorphone 4 MG Tabs  Commonly known as: Dilaudid      Take 1 tablet (4 mg total) by mouth every 4 (four) hours as needed for Pain.   Refills: 0     Promethegan 25 MG Supp  Generic drug: promethazine      Place 1 suppository (25 mg total) rectally every 6 (six) hours as needed.   Refills: 0     REMICADE IV       Refills: 0     Tresiba FlexTouch 100 UNIT/ML Sopn  Generic drug: insulin degludec      Inject 40 Units into the skin daily. Prime 2 units before each dose   Refills: 0               Where to Get Your Medications        These medications were sent to [facility]/pharmacy #[id number] - [place], [place] - [address] [phone], [phone]  [address], [facility] [address]      Phone: [phone]   HYDROcodone-acetaminophen  MG Tabs  Naloxone HCl 4 MG/0.1ML Liqd  polyethylene glycol (PEG 3350)  17 g Pack         [place] reviewed:     Follow-up appointment:   [facility]   [address] [place] [address]  [phone]    Go on 2025  at 11:00am    Eamon Gibson MD   [facility] [address]  [place] [address]  [phone]    Go on 5/15/2025  at 8:45am    Belgica Garcia DO  [address]  [place] [address]  [phone]    Follow up in 3 week(s)  As needed. Recommend continue MiraLAX twice daily upon discharge to keep stools soft. Continue to follow-up with colorectal surgery. Recommend continue to follow-up with her previously established GI, but if wanted to follow-up locally, can follow-up with NP or Dr. Garcia in 3 to 4 weeks.    Appointments for Next 30 Days 2025 - 2025        Date Arrival Time Visit Type Length Department Provider     2025 11:00 AM  POST OP VISIT [2853] 15 min [facility], [facility]    Patient Instructions:         Location Instructions:     Masks are optional for all patients and visitors, unless otherwise indicated. Note: A $50 fee will be charged for missed appointments or same-day cancellations. Please provide 24 hours' notice if you need to cancel or reschedule your appointment.                      Vital signs:  Temp:  [97.9 °F (36.6 °C)-99.2 °F (37.3 °C)] 98.8 °F (37.1 °C)  Pulse:  [72-93] 79  Resp:  [16] 16  BP: ()/(52-63) 95/63  SpO2:  [94 %-97 %] 95 %    Physical Exam:    General: No acute distress   Lungs: clear to auscultation  Cardiovascular: S1, S2  Abdomen: Soft      -----------------------------------------------------------------------------------------------  PATIENT DISCHARGE INSTRUCTIONS: See electronic chart    Wilson Narayan DO    Total time spent on discharge plannin minutes     The  Cent[employer] Cures Act makes medical notes like these available to patients in the interest of transparency. Please be advised this is a medical document. Medical documents are intended to  carry relevant information, facts as evident, and the clinical opinion of the practitioner. The medical note is intended as peer to peer communication and may appear blunt or direct. It is written in medical language and may contain abbreviations or verbiage that are unfamiliar.       Electronically signed by Wilson Narayan DO on 4/12/2025  2:56 PM         REVIEWER COMMENTS

## 2025-04-14 NOTE — PROGRESS NOTES
Transitional Care Management   Discharge Date: 25  Contact Date: 2025    Assessment:  TCM Initial Assessment    General:  Assessment completed with: Patient  Patient Subjective: Pt doing better since discharge.  Chief Complaint: Uncontrolled type 1 diabetes mellitus with hypoglycemia without coma  Verify patient name and  with patient/ caregiver: Yes    Hospital Stay/Discharge:  Prior to leaving the hospital were your Discharge Instructions reviewed with you?: Yes  Did you receive a copy of your written Discharge Instructions?: Yes  Do you feel better or worse since you left the hospital or emergency department?: Better    Follow - Up Appointment:  Do you have a follow-up appointment?: No    Home Health/DME:  Prior to leaving the hospital was Home Health (HH) arranged for you?: No     Prior to leaving the hospital or emergency department was Durable Medical Equipment (DME), medical supplies, or infusions arranged for you?: No  Are DME/medical supply/infusions needs identified by staff during this assessment?: No     Medications/Diet:       Were you given a different diet per your Discharge Instructions?: No     Questions/Concerns:            Follow-up Appointments:  Your appointments       Date & Time Appointment Department (Center)    2025 11:00 AM CDT Post Op Visit with [facility]        May 15, 2025 8:45 AM CDT Post Op Visit with Eamon Gibson MD [facility] ([facility])              [facility]   [facility] [address] [phone]            [facility]  Was TCC Ordered: Yes  Was TCC Scheduled: No, Explain outside PCP.    Primary Care Provider (If no TCC appointment)  Does patient already have a PCP appointment scheduled? No  Care  Manager Attempted to schedule PCP office TCM appointment with patient   -If no appointment scheduled: Explain see above.    Specialist  Does the patient have any other follow-up appointment(s) that need to be scheduled? Yes   -If yes: Care Manager reviewed upcoming specialist appointments with patient: Yes   -Does the patient need assistance scheduling appointment(s): No      Book By Date: 4/25/25

## 2025-04-22 ENCOUNTER — HOSPITAL ENCOUNTER (OUTPATIENT)
Facility: HOSPITAL | Age: 40
Setting detail: OBSERVATION
Discharge: LEFT BEFORE DISCHARGE | End: 2025-04-24
Attending: EMERGENCY MEDICINE | Admitting: HOSPITALIST
Payer: COMMERCIAL

## 2025-04-22 DIAGNOSIS — R73.9 HYPERGLYCEMIA: Primary | ICD-10-CM

## 2025-04-22 DIAGNOSIS — K62.89 ANAL OR RECTAL PAIN: ICD-10-CM

## 2025-04-22 LAB
BASOPHILS # BLD AUTO: 0.02 X10(3) UL (ref 0–0.2)
BASOPHILS NFR BLD AUTO: 0.3 %
EOSINOPHIL # BLD AUTO: 0.03 X10(3) UL (ref 0–0.7)
EOSINOPHIL NFR BLD AUTO: 0.4 %
ERYTHROCYTE [DISTWIDTH] IN BLOOD BY AUTOMATED COUNT: 15 %
GLUCOSE BLD-MCNC: >600 MG/DL (ref 70–99)
HCT VFR BLD AUTO: 32.3 % (ref 35–48)
HGB BLD-MCNC: 11.1 G/DL (ref 12–16)
IMM GRANULOCYTES # BLD AUTO: 0.03 X10(3) UL (ref 0–1)
IMM GRANULOCYTES NFR BLD: 0.4 %
LYMPHOCYTES # BLD AUTO: 1.46 X10(3) UL (ref 1–4)
LYMPHOCYTES NFR BLD AUTO: 18.6 %
MCH RBC QN AUTO: 31 PG (ref 26–34)
MCHC RBC AUTO-ENTMCNC: 34.4 G/DL (ref 31–37)
MCV RBC AUTO: 90.2 FL (ref 80–100)
MONOCYTES # BLD AUTO: 0.53 X10(3) UL (ref 0.1–1)
MONOCYTES NFR BLD AUTO: 6.8 %
NEUTROPHILS # BLD AUTO: 5.76 X10 (3) UL (ref 1.5–7.7)
NEUTROPHILS # BLD AUTO: 5.76 X10(3) UL (ref 1.5–7.7)
NEUTROPHILS NFR BLD AUTO: 73.5 %
PLATELET # BLD AUTO: 337 10(3)UL (ref 150–450)
RBC # BLD AUTO: 3.58 X10(6)UL (ref 3.8–5.3)
WBC # BLD AUTO: 7.8 X10(3) UL (ref 4–11)

## 2025-04-22 RX ORDER — HYDROMORPHONE HYDROCHLORIDE 1 MG/ML
0.5 INJECTION, SOLUTION INTRAMUSCULAR; INTRAVENOUS; SUBCUTANEOUS EVERY 30 MIN PRN
Refills: 0 | Status: COMPLETED | OUTPATIENT
Start: 2025-04-22 | End: 2025-04-23

## 2025-04-22 RX ORDER — ONDANSETRON 2 MG/ML
4 INJECTION INTRAMUSCULAR; INTRAVENOUS ONCE
Status: COMPLETED | OUTPATIENT
Start: 2025-04-22 | End: 2025-04-23

## 2025-04-22 RX ORDER — SODIUM CHLORIDE 9 MG/ML
INJECTION, SOLUTION INTRAVENOUS CONTINUOUS
Status: DISCONTINUED | OUTPATIENT
Start: 2025-04-23 | End: 2025-04-24

## 2025-04-23 ENCOUNTER — APPOINTMENT (OUTPATIENT)
Dept: CT IMAGING | Facility: HOSPITAL | Age: 40
End: 2025-04-23
Attending: EMERGENCY MEDICINE
Payer: COMMERCIAL

## 2025-04-23 PROBLEM — F34.1 PERSISTENT DEPRESSIVE DISORDER: Chronic | Status: ACTIVE | Noted: 2025-04-23

## 2025-04-23 PROBLEM — K62.89 ANAL OR RECTAL PAIN: Status: ACTIVE | Noted: 2025-04-23

## 2025-04-23 LAB
ACETONE: NEGATIVE
ALBUMIN SERPL-MCNC: 4.5 G/DL (ref 3.2–4.8)
ALBUMIN/GLOB SERPL: 2.1 {RATIO} (ref 1–2)
ALP LIVER SERPL-CCNC: 97 U/L (ref 37–98)
ALT SERPL-CCNC: 12 U/L (ref 10–49)
ANION GAP SERPL CALC-SCNC: 7 MMOL/L (ref 0–18)
AST SERPL-CCNC: 19 U/L (ref ?–34)
BASE EXCESS BLDV CALC-SCNC: -3.7 MMOL/L
BILIRUB SERPL-MCNC: 0.2 MG/DL (ref 0.3–1.2)
BILIRUB UR QL STRIP.AUTO: NEGATIVE
BUN BLD-MCNC: 16 MG/DL (ref 9–23)
CALCIUM BLD-MCNC: 9.4 MG/DL (ref 8.7–10.6)
CHLORIDE SERPL-SCNC: 101 MMOL/L (ref 98–112)
CLARITY UR REFRACT.AUTO: CLEAR
CO2 SERPL-SCNC: 23 MMOL/L (ref 21–32)
COLOR UR AUTO: COLORLESS
CREAT BLD-MCNC: 1.08 MG/DL (ref 0.55–1.02)
EGFRCR SERPLBLD CKD-EPI 2021: 67 ML/MIN/1.73M2 (ref 60–?)
GLOBULIN PLAS-MCNC: 2.1 G/DL (ref 2–3.5)
GLUCOSE BLD-MCNC: 188 MG/DL (ref 70–99)
GLUCOSE BLD-MCNC: 191 MG/DL (ref 70–99)
GLUCOSE BLD-MCNC: 251 MG/DL (ref 70–99)
GLUCOSE BLD-MCNC: 251 MG/DL (ref 70–99)
GLUCOSE BLD-MCNC: 272 MG/DL (ref 70–99)
GLUCOSE BLD-MCNC: 309 MG/DL (ref 70–99)
GLUCOSE BLD-MCNC: 46 MG/DL (ref 70–99)
GLUCOSE BLD-MCNC: 492 MG/DL (ref 70–99)
GLUCOSE BLD-MCNC: 54 MG/DL (ref 70–99)
GLUCOSE BLD-MCNC: 694 MG/DL (ref 70–99)
GLUCOSE BLD-MCNC: 88 MG/DL (ref 70–99)
GLUCOSE UR STRIP.AUTO-MCNC: >1000 MG/DL
HCO3 BLDV-SCNC: 21.1 MEQ/L (ref 22–26)
KETONES UR STRIP.AUTO-MCNC: 10 MG/DL
LEUKOCYTE ESTERASE UR QL STRIP.AUTO: NEGATIVE
NITRITE UR QL STRIP.AUTO: NEGATIVE
OSMOLALITY SERPL CALC.SUM OF ELEC: 306 MOSM/KG (ref 275–295)
OXYHGB MFR BLDV: 62.8 % (ref 72–78)
PCO2 BLDV: 42 MM HG (ref 38–50)
PH BLDV: 7.33 [PH] (ref 7.33–7.43)
PH UR STRIP.AUTO: 6 [PH] (ref 5–8)
PO2 BLDV: 38 MM HG (ref 30–50)
POTASSIUM SERPL-SCNC: 4.3 MMOL/L (ref 3.5–5.1)
PROT SERPL-MCNC: 6.6 G/DL (ref 5.7–8.2)
PROT UR STRIP.AUTO-MCNC: NEGATIVE MG/DL
RBC UR QL AUTO: NEGATIVE
SODIUM SERPL-SCNC: 131 MMOL/L (ref 136–145)
SP GR UR STRIP.AUTO: >1.03 (ref 1–1.03)
UROBILINOGEN UR STRIP.AUTO-MCNC: NORMAL MG/DL

## 2025-04-23 PROCEDURE — 99223 1ST HOSP IP/OBS HIGH 75: CPT | Performed by: HOSPITALIST

## 2025-04-23 PROCEDURE — 74177 CT ABD & PELVIS W/CONTRAST: CPT | Performed by: EMERGENCY MEDICINE

## 2025-04-23 PROCEDURE — 99214 OFFICE O/P EST MOD 30 MIN: CPT | Performed by: STUDENT IN AN ORGANIZED HEALTH CARE EDUCATION/TRAINING PROGRAM

## 2025-04-23 RX ORDER — HYDROMORPHONE HYDROCHLORIDE 1 MG/ML
0.4 INJECTION, SOLUTION INTRAMUSCULAR; INTRAVENOUS; SUBCUTANEOUS EVERY 2 HOUR PRN
Refills: 0 | Status: DISCONTINUED | OUTPATIENT
Start: 2025-04-23 | End: 2025-04-24

## 2025-04-23 RX ORDER — NICOTINE POLACRILEX 4 MG
15 LOZENGE BUCCAL
Status: DISCONTINUED | OUTPATIENT
Start: 2025-04-23 | End: 2025-04-24

## 2025-04-23 RX ORDER — HYDROMORPHONE HYDROCHLORIDE 1 MG/ML
0.2 INJECTION, SOLUTION INTRAMUSCULAR; INTRAVENOUS; SUBCUTANEOUS EVERY 2 HOUR PRN
Status: DISCONTINUED | OUTPATIENT
Start: 2025-04-23 | End: 2025-04-23

## 2025-04-23 RX ORDER — NICOTINE POLACRILEX 4 MG
30 LOZENGE BUCCAL
Status: DISCONTINUED | OUTPATIENT
Start: 2025-04-23 | End: 2025-04-24

## 2025-04-23 RX ORDER — ACETAMINOPHEN 500 MG
500 TABLET ORAL EVERY 4 HOURS PRN
Status: DISCONTINUED | OUTPATIENT
Start: 2025-04-23 | End: 2025-04-24

## 2025-04-23 RX ORDER — HYDROCODONE BITARTRATE AND ACETAMINOPHEN 10; 325 MG/1; MG/1
1 TABLET ORAL EVERY 6 HOURS PRN
COMMUNITY

## 2025-04-23 RX ORDER — BISACODYL 10 MG
10 SUPPOSITORY, RECTAL RECTAL
Status: DISCONTINUED | OUTPATIENT
Start: 2025-04-23 | End: 2025-04-24

## 2025-04-23 RX ORDER — PROCHLORPERAZINE EDISYLATE 5 MG/ML
5 INJECTION INTRAMUSCULAR; INTRAVENOUS EVERY 8 HOURS PRN
Status: DISCONTINUED | OUTPATIENT
Start: 2025-04-23 | End: 2025-04-24

## 2025-04-23 RX ORDER — SENNOSIDES 8.6 MG
17.2 TABLET ORAL NIGHTLY PRN
Status: DISCONTINUED | OUTPATIENT
Start: 2025-04-23 | End: 2025-04-24

## 2025-04-23 RX ORDER — HYDROMORPHONE HYDROCHLORIDE 1 MG/ML
0.2 INJECTION, SOLUTION INTRAMUSCULAR; INTRAVENOUS; SUBCUTANEOUS EVERY 2 HOUR PRN
Refills: 0 | Status: DISCONTINUED | OUTPATIENT
Start: 2025-04-23 | End: 2025-04-24

## 2025-04-23 RX ORDER — HYDROMORPHONE HYDROCHLORIDE 1 MG/ML
0.4 INJECTION, SOLUTION INTRAMUSCULAR; INTRAVENOUS; SUBCUTANEOUS EVERY 2 HOUR PRN
Status: DISCONTINUED | OUTPATIENT
Start: 2025-04-23 | End: 2025-04-23

## 2025-04-23 RX ORDER — DIBUCAINE 0.28 G/28G
OINTMENT TOPICAL 3 TIMES DAILY
Status: DISCONTINUED | OUTPATIENT
Start: 2025-04-23 | End: 2025-04-24

## 2025-04-23 RX ORDER — HYDROMORPHONE HYDROCHLORIDE 1 MG/ML
1 INJECTION, SOLUTION INTRAMUSCULAR; INTRAVENOUS; SUBCUTANEOUS EVERY 2 HOUR PRN
Refills: 0 | Status: DISCONTINUED | OUTPATIENT
Start: 2025-04-23 | End: 2025-04-24

## 2025-04-23 RX ORDER — ONDANSETRON 2 MG/ML
4 INJECTION INTRAMUSCULAR; INTRAVENOUS EVERY 6 HOURS PRN
Status: DISCONTINUED | OUTPATIENT
Start: 2025-04-23 | End: 2025-04-24

## 2025-04-23 RX ORDER — ACETAMINOPHEN 325 MG/1
650 TABLET ORAL EVERY 4 HOURS PRN
Status: DISCONTINUED | OUTPATIENT
Start: 2025-04-23 | End: 2025-04-24

## 2025-04-23 RX ORDER — HYDROCODONE BITARTRATE AND ACETAMINOPHEN 5; 325 MG/1; MG/1
2 TABLET ORAL EVERY 4 HOURS PRN
Status: DISCONTINUED | OUTPATIENT
Start: 2025-04-23 | End: 2025-04-24

## 2025-04-23 RX ORDER — SODIUM CHLORIDE 9 MG/ML
INJECTION, SOLUTION INTRAVENOUS CONTINUOUS
Status: DISCONTINUED | OUTPATIENT
Start: 2025-04-23 | End: 2025-04-24

## 2025-04-23 RX ORDER — INSULIN ASPART 100 [IU]/ML
0.2 INJECTION, SOLUTION INTRAVENOUS; SUBCUTANEOUS ONCE
Status: COMPLETED | OUTPATIENT
Start: 2025-04-23 | End: 2025-04-23

## 2025-04-23 RX ORDER — DEXTROSE MONOHYDRATE 25 G/50ML
50 INJECTION, SOLUTION INTRAVENOUS
Status: DISCONTINUED | OUTPATIENT
Start: 2025-04-23 | End: 2025-04-24

## 2025-04-23 RX ORDER — HYDROCODONE BITARTRATE AND ACETAMINOPHEN 5; 325 MG/1; MG/1
1 TABLET ORAL EVERY 4 HOURS PRN
Status: DISCONTINUED | OUTPATIENT
Start: 2025-04-23 | End: 2025-04-24

## 2025-04-23 RX ORDER — HYDROMORPHONE HYDROCHLORIDE 1 MG/ML
1 INJECTION, SOLUTION INTRAMUSCULAR; INTRAVENOUS; SUBCUTANEOUS ONCE
Refills: 0 | Status: COMPLETED | OUTPATIENT
Start: 2025-04-23 | End: 2025-04-23

## 2025-04-23 RX ORDER — INSULIN DEGLUDEC 100 U/ML
30 INJECTION, SOLUTION SUBCUTANEOUS DAILY
Status: DISCONTINUED | OUTPATIENT
Start: 2025-04-23 | End: 2025-04-24

## 2025-04-23 RX ORDER — POLYETHYLENE GLYCOL 3350 17 G/17G
17 POWDER, FOR SOLUTION ORAL DAILY PRN
Status: DISCONTINUED | OUTPATIENT
Start: 2025-04-23 | End: 2025-04-24

## 2025-04-23 RX ORDER — ESCITALOPRAM OXALATE 20 MG/1
20 TABLET ORAL DAILY
Status: DISCONTINUED | OUTPATIENT
Start: 2025-04-23 | End: 2025-04-24

## 2025-04-23 RX ORDER — HYDROMORPHONE HYDROCHLORIDE 1 MG/ML
0.5 INJECTION, SOLUTION INTRAMUSCULAR; INTRAVENOUS; SUBCUTANEOUS ONCE
Refills: 0 | Status: COMPLETED | OUTPATIENT
Start: 2025-04-23 | End: 2025-04-23

## 2025-04-23 RX ORDER — HYDROCORTISONE 25 MG/G
CREAM TOPICAL 2 TIMES DAILY
Status: DISCONTINUED | OUTPATIENT
Start: 2025-04-23 | End: 2025-04-24

## 2025-04-23 RX ORDER — ENOXAPARIN SODIUM 100 MG/ML
40 INJECTION SUBCUTANEOUS DAILY
Status: DISCONTINUED | OUTPATIENT
Start: 2025-04-23 | End: 2025-04-24

## 2025-04-23 RX ORDER — HYDROMORPHONE HYDROCHLORIDE 1 MG/ML
0.8 INJECTION, SOLUTION INTRAMUSCULAR; INTRAVENOUS; SUBCUTANEOUS EVERY 2 HOUR PRN
Status: DISCONTINUED | OUTPATIENT
Start: 2025-04-23 | End: 2025-04-23

## 2025-04-23 RX ORDER — SODIUM PHOSPHATE, DIBASIC AND SODIUM PHOSPHATE, MONOBASIC 7; 19 G/230ML; G/230ML
1 ENEMA RECTAL ONCE AS NEEDED
Status: DISCONTINUED | OUTPATIENT
Start: 2025-04-23 | End: 2025-04-24

## 2025-04-23 NOTE — ED QUICK NOTES
Rounding Completed    Patient is asleep in cart in no apparent distress. Waiting for transport to 2236.    Bed is locked and in lowest position. Call light within reach.

## 2025-04-23 NOTE — ED QUICK NOTES
Orders for admission, patient is aware of plan and ready to go upstairs. Any questions, please call ED RN Lilian at extension [phone].     Patient Covid vaccination status: Unvaccinated     COVID Test Ordered in ED: None    COVID Suspicion at Admission: N/A    Running Infusions: Medication Infusions[1]     Mental Status/LOC at time of transport: AOX4    Other pertinent information:   -blood glucose monitoring     CIWA score: N/A   NIH score:  N/A        [1]    sodium chloride 125 mL/hr at 04/23/25 0205

## 2025-04-23 NOTE — ED QUICK NOTES
Rounding Completed    Plan of Care reviewed. Waiting for bed assignment for admission.    Bed is locked and in lowest position. Call light within reach.

## 2025-04-23 NOTE — ED INITIAL ASSESSMENT (HPI)
Anal fistula repair on 4/11/25, presents with drainage from rectum, N/V and hyperglycemia, states sugars have 400-600s.

## 2025-04-23 NOTE — PROGRESS NOTES
The patient is A/Ox4, On RA, no SOB, Vitals Stable, Afebrile, C/O of rectal pain, rates pain as 8/10, requesting dilaudid.   Voids  Ambulatory  Consults following- general surgery  GI stool panel is pending, the patient did not have a BM  Accuchecks, insuline coverage per MAR     1645 - The patient applied topical ointment for rectal pain and hydrocortisone rectal cream. The patient still reports having rectal pain and rates it 8/10. PRN Dilaudid given per MAR, the patient is requesting this writer to \"page the doctor to increase the dose for dilaudid\". The patient reports that she is \"usually taking 1.5-2 mg\", based on her previous admissions to the hospital. The patient appears in no distress and is ordering dinner. The patient reports pain relief after previous Dilaudid given per mar and states that her pain went down to 5/10 after receiving the medications. The patient was observed sleeping after previus doses of dilaudid given. Hospitalist notified. No adjustment to pain medication regiment.  1830 - the patient ate dinner and is asleep.

## 2025-04-23 NOTE — CONSULTS
[facility]  Report of Consultation    Esperanza Mauro Patient Status:  Emergency    1985 MRN [id number]   Location [facility] EMERGENCY DEPARTMENT Attending Brent Chatterjee MD   Hosp Day # 0 PCP None Pcp     Requesting Physician:  Dr. Brent Chatterjee    Reason for Consultation:  Perirectal pain status post fistulotomy    Chief Complaint:  Elevated glucose level    History of Present Illness:  Esperanza Mauro is a 40 year old female who presents to [facility] on 2025 with complaints of elevated glucose levels and perirectal pain.  Patient has a complicated history.  She has a history of a diagnosis of Crohn's disease and hidradenitis suppurativa.  She reports she has undergone 70+ anorectal operations primarily by Dr.Imad Adam, a colorectal surgeon at [facility].  The patient most recently underwent anal exam under anesthesia with anoscopy and completion fistulotomy on 2025 by Dr. Gibson.  Patient states that since her discharge to home she was overall doing well until 3 days ago.  She reports that she began to have increasing perirectal pain.  She also reported increased drainage.  She reported a yellowish discharge that is malodorous.  She denied bleeding.  She denied perirectal swelling.  She reports also left lower quadrant abdominal pain.  The patient reports that she has had a elevated glucose levels.  She reports mild nausea and vomiting yesterday that she attributes to her elevated glucose.    Upon presentation to the hospital, the patient was afebrile.  WBC 7.8 hemoglobin 11.1 platelets 337,000.  Glucose 694 BUN 16 creatinine 1.08.  CT scan of the abdomen pelvis with findings of mild stranding in the pelvis which may represent enteritis.  No abscess or fluid collection is identified.    Past abdominal surgical history is negative for previous abdominal surgery.      History:  Past Medical History[1]  Past Surgical History[2]  Family History[3]   reports that she  has never smoked. She has never used smokeless tobacco. She reports that she does not currently use alcohol. She reports that she does not use drugs.    Allergies:  Allergies[4]    Medications:  Prescriptions Prior to Admission[5]    Current Hospital Medications[6]      Physical Exam:  BP 92/54   Pulse 81   Temp 98 °F (36.7 °C) (Oral)   Resp 16   LMP 10/01/2024   SpO2 97%   Physical Exam  Constitutional:       General: She is not in acute distress.     Appearance: Normal appearance. She is not ill-appearing.   HENT:      Head: Normocephalic and atraumatic.      Mouth/Throat:      Pharynx: Oropharynx is clear.   Eyes:      Conjunctiva/sclera: Conjunctivae normal.   Cardiovascular:      Rate and Rhythm: Normal rate and regular rhythm.      Pulses: Normal pulses.   Pulmonary:      Effort: Pulmonary effort is normal. No respiratory distress.   Abdominal:      General: There is no distension.      Palpations: Abdomen is soft.      Tenderness: There is no abdominal tenderness. There is no guarding or rebound.      Comments: Perirectal exam was performed with the patient in the left lateral decubitus position.  Fistulotomy incision noted to the left anterior area that is healing well.  There is no bleeding or drainage noted.  There is minimal yellowish discharge noted on the patient's dressing.  There previous fistulotomy incision site is tender to palpation.  The surrounding tissue is soft, no induration or erythema noted.   Musculoskeletal:         General: Normal range of motion.      Cervical back: Normal range of motion.   Skin:     General: Skin is warm and dry.   Neurological:      General: No focal deficit present.      Mental Status: She is alert and oriented to person, place, and time.   Psychiatric:         Mood and Affect: Mood normal.         Behavior: Behavior normal.         Laboratory Data:  Recent Labs   Lab 04/22/25  2348   RBC 3.58*   HGB 11.1*   HCT 32.3*   MCV 90.2   MCH 31.0   MCHC 34.4   RDW  15.0   NEPRELIM 5.76   WBC 7.8   .0       Recent Labs   Lab 04/22/25  2348   *   BUN 16   CREATSERUM 1.08*   CA 9.4   ALB 4.5   *   K 4.3      CO2 23.0   ALKPHO 97   AST 19   ALT 12   BILT 0.2*   TP 6.6         No results for input(s): \"PTP\", \"INR\", \"PTT\" in the last 168 hours.      CT ABDOMEN+PELVIS(CONTRAST ONLY)(CPT=74177)  Result Date: 4/23/2025  CONCLUSION:  1. Mild stranding in the pelvis may represent enteritis. 2. No abscess or fluid collection is identified.   LOCATION:  [facility]   Dictated by (CST): Mark Skinner MD on 4/23/2025 at 7:36 AM     Finalized by (CST): Mark Skinner MD on 4/23/2025 at 7:42 AM       Is this a shared or split note between Advanced Practice Provider and Physician? Yes        Medical Decision Making         Impression:  Problem List[7]    The patient is a 40-year-old female millimeters to our hospital for elevated glucose levels and perirectal tenderness status post fistulotomy on 4/11/2025.  No evidence of recurrence of abscess on imaging or physical exam.    Plan:  The patient was discussed with Dr. Gibson and he recommends no acute surgical interventions recommended at this time.  Diet as tolerated.  Medical management as per attending.  Thank you for consultation.  General surgery will continue to follow.    Nilda Cuba PA-C  4/23/2025  11:04 AM    The patient was independently examined. I agree with the PA's assessment and plan.     This is a 40-year-old female with a somewhat complicated medical and surgical history. I established care with the patient on 4/11/2025 when she was hospitalized with severe rectal pain at that time.     Patient is a type I diabetic.  She carries a diagnosis of Crohn's disease and hidradenitis suppurativa.  She has undergone reportedly 70+ anorectal operations primarily performed by Dr. Eliceo Adam, colorectal surgeon at [facility] in [facility].    I took the patient for an anal exam under anesthesia with anoscopy and  completion fistulotomy on 4/11/2025.  I discovered a subcutaneous anal fistula with seton in place.  Overall, there were no findings to explain the patient's severe rectal pain.    Patient is currently hospitalized with worsening rectal pain and hyperglycemia over the last 2 days or so.  She is having some expected drainage.  No fevers or bleeding.    Patient has been afebrile and hemodynamically normal.  White blood cell count within normal limits.  CT scan of the abdomen and pelvis reassuring without any signs of perianal infection or proctitis. I have personally reviewed this CT imaging.    Patient appears in no acute distress and answering questions appropriately. Patient was examined in the left lateral decubitus position with knees flexed and female nurse chaperone present.  External exam of the anus revealed a small, swollen, partially thrombosed left lateral external hemorrhoid.  There was no active bleeding or drainage.  There was minimal yellow serous drainage on the gauze overlying the patient's anus.  No induration or erythema.    I suspect the patient has developed a swollen and partially thrombosed hemorrhoid which is accounting for her worsening acute on chronic rectal pain lately.  I recommend supportive care with Dibucaine ointment and a 1 week course of Anusol.  Patient can do sitz bath's when she is discharged.  Patient should continue her chronic pain medication as needed.  Continue a bowel regimen to prevent constipation.    Patient can follow[place]up with [facility] [place] in 2 weeks upon discharge.  Surgery to sign off but please contact with any further questions or concerns.    More than 50% of clinical time and 100% MDM was performed by me.    Eamon Gibson MD  [facility]         [1]   Past Medical History:   Crohn's disease (HCC)    Hidradenitis suppurativa    Type 1 diabetes mellitus (HCC)   [2]   Past Surgical History:  Procedure Laterality Date    I&d deep absc neck/chst+rib  cut      Other surgical history  04/02/2025    [facility]. rectal fistula repair    Repair of anal fistula w/fibrin glue     [3] History reviewed. No pertinent family history.  [4]   Allergies  Allergen Reactions    Vancomycin ANAPHYLAXIS    Morphine RASH    Toradol [Ketorolac Tromethamine] RASH   [5] (Not in a hospital admission)  [6]   Current Facility-Administered Medications:     escitalopram (Lexapro) tab 20 mg, 20 mg, Oral, Daily    glucose (Dex4) 15 GM/59ML oral liquid 15 g, 15 g, Oral, Q15 Min PRN **OR** glucose (Glutose) 40% oral gel 15 g, 15 g, Oral, Q15 Min PRN **OR** glucose-vitamin C (Dex-4) chewable tab 4 tablet, 4 tablet, Oral, Q15 Min PRN **OR** dextrose 50% injection 50 mL, 50 mL, Intravenous, Q15 Min PRN **OR** glucose (Dex4) 15 GM/59ML oral liquid 30 g, 30 g, Oral, Q15 Min PRN **OR** glucose (Glutose) 40% oral gel 30 g, 30 g, Oral, Q15 Min PRN **OR** glucose-vitamin C (Dex-4) chewable tab 8 tablet, 8 tablet, Oral, Q15 Min PRN    melatonin tab 3 mg, 3 mg, Oral, Nightly PRN    sodium chloride 0.9% infusion, , Intravenous, Continuous    enoxaparin (Lovenox) 40 MG/0.4ML SUBQ injection 40 mg, 40 mg, Subcutaneous, Daily    acetaminophen (Tylenol Extra Strength) tab 500 mg, 500 mg, Oral, Q4H PRN    acetaminophen (Tylenol) tab 650 mg, 650 mg, Oral, Q4H PRN **OR** HYDROcodone-acetaminophen (Norco) 5-325 MG per tab 1 tablet, 1 tablet, Oral, Q4H PRN **OR** HYDROcodone-acetaminophen (Norco) 5-325 MG per tab 2 tablet, 2 tablet, Oral, Q4H PRN    HYDROmorphone (Dilaudid) 1 MG/ML injection 0.2 mg, 0.2 mg, Intravenous, Q2H PRN **OR** HYDROmorphone (Dilaudid) 1 MG/ML injection 0.4 mg, 0.4 mg, Intravenous, Q2H PRN **OR** HYDROmorphone (Dilaudid) 1 MG/ML injection 0.8 mg, 0.8 mg, Intravenous, Q2H PRN    ondansetron (Zofran) 4 MG/2ML injection 4 mg, 4 mg, Intravenous, Q6H PRN    prochlorperazine (Compazine) 10 MG/2ML injection 5 mg, 5 mg, Intravenous, Q8H PRN    polyethylene glycol (PEG 3350) (Miralax) 17 g oral  packet 17 g, 17 g, Oral, Daily PRN    sennosides (Senokot) tab 17.2 mg, 17.2 mg, Oral, Nightly PRN    bisacodyl (Dulcolax) 10 MG rectal suppository 10 mg, 10 mg, Rectal, Daily PRN    fleet enema (Fleet) rectal enema 133 mL, 1 enema, Rectal, Once PRN    insulin aspart (NovoLOG) 100 Units/mL FlexPen 1-10 Units, 1-10 Units, Subcutaneous, TID AC and HS    insulin degludec (Tresiba) 100 units/mL flextouch 30 Units, 30 Units, Subcutaneous, Daily    piperacillin-tazobactam (Zosyn) 3.375 g in dextrose 5% 100 mL IVPB-ADDV, 3.375 g, Intravenous, Q8H    glucose (Dex4) 15 GM/59ML oral liquid 15 g, 15 g, Oral, Q15 Min PRN **OR** glucose (Glutose) 40% oral gel 15 g, 15 g, Oral, Q15 Min PRN **OR** dextrose 50% injection 50 mL, 50 mL, Intravenous, Q15 Min PRN **OR** glucose (Dex4) 15 GM/59ML oral liquid 30 g, 30 g, Oral, Q15 Min PRN **OR** glucose (Glutose) 40% oral gel 30 g, 30 g, Oral, Q15 Min PRN    [COMPLETED] sodium chloride 0.9 % IV bolus 1,000 mL, 1,000 mL, Intravenous, Once **FOLLOWED BY** sodium chloride 0.9% infusion, , Intravenous, Continuous  [7]   Patient Active Problem List  Diagnosis    Hyponatremia    Hyperglycemia    Anemia    Crohn's disease without complication, unspecified gastrointestinal tract location (HCC)    Type 1 diabetes mellitus with hyperglycemia (HCC)    Uncontrolled type 1 diabetes mellitus with hypoglycemia without coma (HCC)    Rectal pain    Exacerbation of Crohn's disease of large intestine (HCC)    Rectal fistula, complex, recurrent    Anal fistula    Persistent depressive disorder

## 2025-04-23 NOTE — ED QUICK NOTES
Patient continues to sleep in cart in no apparent distress; awaiting bed assignment for admission

## 2025-04-23 NOTE — H&P
[facility]     Esperanza Mauro Patient Status:  Emergency    1985 MRN [id number]   Location [facility] EMERGENCY DEPARTMENT Attending Brent Chatterjee MD   Hosp Day # 0 PCP None Pcp     Chief Complaint: rectal pain    Subjective:    History of Present Illness:     Esperanza Mauro is a 40 year old female with, type 1 diabetes presents to the emergency room with rectal pain patient had a fistulectomy done at the hospital here in the last 24 hours has noticed some increased rectal pain swelling and drainage.  Patient is also having high blood sugars.  Patient denies any fevers, chills, nausea, vomiting.  No chest pain, shortness of breath, palpitations.    History/Other:    Past Medical History:  Past Medical History[1]  Past Surgical History:   Past Surgical History[2]   Family History:   Family History[3]  Social History:    reports that she has never smoked. She has never used smokeless tobacco. She reports that she does not currently use alcohol. She reports that she does not use drugs.     Allergies: Allergies[4]    Medications:  Medications Ordered Prior to Encounter[5]    Review of Systems:   A comprehensive review of systems was completed.    Pertinent positives and negatives noted in the HPI.    Objective:   Physical Exam:    /64   Pulse 79   Temp 98 °F (36.7 °C) (Oral)   Resp 12   LMP 10/01/2024   SpO2 96%   General: No acute distress, Alert  Respiratory: No rhonchi, no wheezes  Cardiovascular: S1, S2. Regular rate and rhythm  Abdomen: Soft, Non-tender, non-distended, positive bowel sounds  Neuro: No new focal deficits  Extremities: No edema      Results:    Labs:      Labs Last 24 Hours:    Recent Labs   Lab 25  2348   RBC 3.58*   HGB 11.1*   HCT 32.3*   MCV 90.2   MCH 31.0   MCHC 34.4   RDW 15.0   NEPRELIM 5.76   WBC 7.8   .0       Recent Labs   Lab 25  2348   *   BUN 16   CREATSERUM 1.08*   EGFRCR 67   CA 9.4   ALB 4.5   NA  131*   K 4.3      CO2 23.0   ALKPHO 97   AST 19   ALT 12   BILT 0.2*   TP 6.6       Estimated Glomerular Filtration Rate: 67 mL/min/1.73m2 (result from lab).    No results found for: \"PT\", \"INR\"    No results for input(s): \"TROP\", \"TROPHS\", \"CK\" in the last 168 hours.    No results for input(s): \"TROP\", \"PBNP\" in the last 168 hours.    No results for input(s): \"PCT\" in the last 168 hours.    Imaging: Imaging data reviewed in Epic.    Assessment & Plan:      #Rectal pain  - recent fistulectomy   - continue on zosyn  - surgery on consult    # Type 1 diabetes  - Will continue long acting, prandial and correction factor insulin.    # Crohns disease  - pt is on remicade    # Depression  - Will continue on lexapro.      Plan of care discussed with patient at bedside.    Jordan Addison, DO    Supplementary Documentation:     The 21st Century Cures Act makes medical notes like these available to patients in the interest of transparency. Please be advised this is a medical document. Medical documents are intended to carry relevant information, facts as evident, and the clinical opinion of the practitioner. The medical note is intended as peer to peer communication and may appear blunt or direct. It is written in medical language and may contain abbreviations or verbiage that are unfamiliar.                                     [1]   Past Medical History:   Crohn's disease (HCC)    Hidradenitis suppurativa    Type 1 diabetes mellitus (HCC)   [2]   Past Surgical History:  Procedure Laterality Date    I&d deep absc neck/chst+rib cut      Other surgical history  04/02/2025    [facility]. rectal fistula repair    Repair of anal fistula w/fibrin glue     [3] History reviewed. No pertinent family history.  [4]   Allergies  Allergen Reactions    Vancomycin ANAPHYLAXIS    Morphine RASH    Toradol [Ketorolac Tromethamine] RASH   [5]   Current Facility-Administered Medications on File Prior to Encounter    Medication Dose Route Frequency Provider Last Rate Last Admin    [COMPLETED] HYDROmorphone (Dilaudid) 1 MG/ML injection 2 mg  2 mg Intravenous Once Wilson Narayan DO   2 mg at 04/12/25 1235    [COMPLETED] meperidine (Demerol) 25 MG/ML injection 12.5 mg  12.5 mg Intravenous PRN Areli Hernandez CRNA   12.5 mg at 04/11/25 1221    [COMPLETED] glucose-vitamin C (Dex-4) 4-6 g-mg chewable tab        Given at 04/09/25 0035    [COMPLETED] HYDROmorphone (Dilaudid) 1 MG/ML injection 1 mg  1 mg Intravenous Once Katina Ambrosio MD   1 mg at 04/09/25 0145    [COMPLETED] sodium chloride 0.9 % IV bolus 1,000 mL  1,000 mL Intravenous Once Rubina Powell MD   Stopped at 04/08/25 2210    [COMPLETED] HYDROmorphone (Dilaudid) 1 MG/ML injection 1 mg  1 mg Intravenous Once Rubina Powell MD   1 mg at 04/08/25 2110    [COMPLETED] insulin aspart (NovoLOG) 100 Units/mL vial 13 Units  0.2 Units/kg Subcutaneous Once Rubina Powell MD   13 Units at 04/08/25 2118    [COMPLETED] iopamidol 76% (ISOVUE-370) injection for power injector  75 mL Intravenous ONCE PRN Rubina Powell MD   75 mL at 04/08/25 2206    [COMPLETED] HYDROmorphone (Dilaudid) 1 MG/ML injection 1 mg  1 mg Intravenous Once Rubina Powell MD   1 mg at 04/08/25 2141    [COMPLETED] HYDROmorphone (Dilaudid) 1 MG/ML injection 1 mg  1 mg Intravenous Once Rubina Powell MD   1 mg at 04/08/25 2256    [COMPLETED] metroNIDAZOLE in sodium chloride 0.79% (Flagyl) 5 mg/mL IVPB premix 500 mg  500 mg Intravenous Once Rubina Powell  mL/hr at 04/09/25 0003 500 mg at 04/09/25 0003    [COMPLETED] glucose (Glutose) 40% oral gel        Given at 04/08/25 2350    [COMPLETED] sodium ferric gluconate (Ferrlecit) 125 mg in sodium chloride 0.9% 100mL IVPB premix  125 mg Intravenous Once Belgica Garcia  mL/hr at 03/27/25 1132 125 mg at 03/27/25 1132    [COMPLETED] sodium chloride 0.9 % IV bolus 1,000 mL  1,000 mL Intravenous Once Brandt Garcia MD   Stopped at 03/26/25 0594     [COMPLETED] insulin aspart (NovoLOG) 100 Units/mL vial 14 Units  0.2 Units/kg Subcutaneous Once Brandt Garcia MD   14 Units at 03/26/25 0335    [COMPLETED] ondansetron (Zofran) 4 MG/2ML injection 4 mg  4 mg Intravenous Once Brandt Garcia MD   4 mg at 03/26/25 0338    [COMPLETED] HYDROmorphone (Dilaudid) 1 MG/ML injection 1 mg  1 mg Intravenous Once Brandt Garcia MD   1 mg at 03/26/25 0327    [COMPLETED] iopamidol 76% (ISOVUE-370) injection for power injector  80 mL Intravenous ONCE PRN Brandt Garcia MD   80 mL at 03/26/25 0413     Current Outpatient Medications on File Prior to Encounter   Medication Sig Dispense Refill    HYDROcodone-acetaminophen  MG Oral Tab Take 1-2 tablets by mouth every 4 (four) hours as needed for Pain. 10 tablet 0    Naloxone HCl 4 MG/0.1ML Nasal Liquid 4 mg by Nasal route as needed. If patient remains unresponsive, repeat dose in other nostril 2-5 minutes after first dose. 1 kit 0    polyethylene glycol, PEG 3350, 17 g Oral Powd Pack Take 17 g by mouth daily. 510 g 2    HYDROmorphone 4 MG Oral Tab Take 1 tablet (4 mg total) by mouth every 4 (four) hours as needed for Pain.      escitalopram 20 MG Oral Tab Take 1 tablet (20 mg total) by mouth daily.      inFLIXimab (REMICADE IV)       Amphetamine-Dextroamphet ER 20 MG Oral Capsule SR 24 Hr Take 1 capsule (20 mg total) by mouth every morning. (Patient taking differently: Take 1 capsule (20 mg total) by mouth daily as needed.)      amphetamine-dextroamphetamine 10 MG Oral Tab Take 1 tablet (10 mg total) by mouth daily. (Patient taking differently: Take 1 tablet (10 mg total) by mouth daily as needed.)      TRESIBA FLEXTOUCH 100 UNIT/ML Subcutaneous Solution Pen-injector Inject 40 Units into the skin daily. Prime 2 units before each dose (Patient taking differently: Inject 15 Units into the skin 2 (two) times daily. Prime 2 units before each dose)      HUMALOG KWIKPEN 100 UNIT/ML Subcutaneous Solution Pen-injector  Inject 8-14 Units into the skin 3 (three) times daily with meals. Plus sliding scale. Carb ratio 1-6 grams. Correction 1-30. Total 50 units daily. (Patient taking differently: Inject 3-6 Units into the skin 3 (three) times daily with meals. Per sliding scale)      PROMETHEGAN 25 MG Rectal Suppos Place 1 suppository (25 mg total) rectally every 6 (six) hours as needed.

## 2025-04-23 NOTE — ED PROVIDER NOTES
Patient Seen in: [facility] [place] Emergency Department      History     Chief Complaint   Patient presents with   • Hyperglycemia   • Postop/Procedure Problem     Stated Complaint: type 1 diabetic, -600s, recent surg fistulectomy in rectum 4/10/25 here, *    Subjective:   HPI    40-year-old type I diabetic Crohn's disease patient presented emerged part for elevated blood sugars and rectal pain.  Patient had a recent fistulectomy done here at this facility within the last 24 hours she has been noticing some increasing rectal pain swelling drainage elevated blood sugars prompting her visit here she denies any sort of cough cold fevers or any other exacerbating relieving factor associated symptom.  History of Present Illness               Objective:     Past Medical History:   • Crohn's disease (HCC)   • Hidradenitis suppurativa   • Type 1 diabetes mellitus (HCC)              Past Surgical History:   Procedure Laterality Date   • I&d deep absc neck/chst+rib cut     • Other surgical history  04/02/2025    [facility]. rectal fistula repair   • Repair of anal fistula w/fibrin glue                  Social History     Socioeconomic History   • Marital status:    Tobacco Use   • Smoking status: Never   • Smokeless tobacco: Never   Vaping Use   • Vaping status: Never Used   Substance and Sexual Activity   • Alcohol use: Not Currently   • Drug use: Never     Social Drivers of Health     Food Insecurity: No Food Insecurity (4/11/2025)    NCSS - Food Insecurity    • Worried About Running Out of Food in the Last Year: No    • Ran Out of Food in the Last Year: No   Transportation Needs: No Transportation Needs (4/11/2025)    NCSS - Transportation    • Lack of Transportation: No   Housing Stability: Not At Risk (4/11/2025)    NCSS - Housing/Utilities    • Has Housing: Yes    • Worried About Losing Housing: No    • Unable to Get Utilities: No                                Physical Exam     ED Triage Vitals  [04/22/25 2311]   /75   Pulse 102   Resp 18   Temp 98 °F (36.7 °C)   Temp src Oral   SpO2 96 %   O2 Device None (Room air)       Current Vitals:   Vital Signs  BP: 93/57  Pulse: 86  Resp: 19  Temp: 98 °F (36.7 °C)  Temp src: Oral  MAP (mmHg): 68    Oxygen Therapy  SpO2: 98 %  O2 Device: None (Room air)        Physical Exam  Awake alert patient appears no distress HEENT exam is normal lungs are clear cardiovascular exam regular rhythm abdomen soft nontender extremities no Cyanosis or edema no focal neurologic deficits skin is nondiaphoretic  Physical Exam                ED Course     Labs Reviewed   COMP METABOLIC PANEL (14) - Abnormal; Notable for the following components:       Result Value    Glucose 694 (*)     Sodium 131 (*)     Creatinine 1.08 (*)     Calculated Osmolality 306 (*)     Bilirubin, Total 0.2 (*)     A/G Ratio 2.1 (*)     All other components within normal limits   CBC WITH DIFFERENTIAL WITH PLATELET - Abnormal; Notable for the following components:    RBC 3.58 (*)     HGB 11.1 (*)     HCT 32.3 (*)     All other components within normal limits   VENOUS BLOOD GAS - Abnormal; Notable for the following components:    Venous HCO3 21.1 (*)     Venous O2Hb 62.8 (*)     All other components within normal limits   URINALYSIS WITH CULTURE REFLEX - Abnormal; Notable for the following components:    Urine Color Colorless (*)     Spec Gravity >1.030 (*)     Glucose Urine >1000 (*)     Ketones Urine 10 (*)     All other components within normal limits   POCT GLUCOSE - Abnormal; Notable for the following components:    POC Glucose >600 (*)     All other components within normal limits   POCT GLUCOSE - Abnormal; Notable for the following components:    POC Glucose 492 (*)     All other components within normal limits   ACETONE - Normal       ED Course as of 04/23/25 0344  ------------------------------------------------------------  Time: 04/23 0031  Value: CBC With Differential With Platelet(!)  Comment:  Unremarkable  ------------------------------------------------------------  Time: 04/23 0135  Value: Comp Metabolic Panel (14)(!!)  Comment: Hyperglycemia but no signs of increased anion gap  ------------------------------------------------------------  Time: 04/23 0136  Value: Urinalysis with Culture Reflex(!)  Comment: Glucose and ketones seen but no signs of UTI  ------------------------------------------------------------  Time: 04/23 0136  Value: Venous Blood Gas(!)  Comment: Unremarkable     Results            Differential diagnosis includes sepsis, DKA                     MDM      A total of 35 minutes of critical care time (exclusive of billable procedures) was administered to manage the patient's critical lab values due to her hyperglycemia.  This involved direct patient intervention, complex decision making, and/or extensive discussions with the patient, family, and clinical staff.      Admission disposition: 4/23/2025  2:40 AM           Medical Decision Making  40-year-old female presenting to the emergency department for elevated blood glucose and rectal pain.  IV established cardiac monitor shows a sinus rhythm pulse ox shows no signs of hypoxia.  Metabolic panel shows hyperglycemia but no signs of DKA insulin initiated IV fluids initiated white cell count within acceptable is.  Independent interpretation by ED physician of CT scan shows no signs of abscess patient will be admitted for pain control IV fluids and hyperglycemia care.  This note was prepared using Dragon Medical voice recognition dictation software.  As a result errors may occur.  When identified to these areas have been corrected.  While every attempt is made to correct errors during dictation discrepancies may still exist.  Please contact if there are any errors    Problems Addressed:  Anal or rectal pain: acute illness or injury  Hyperglycemia: acute illness or injury    Amount and/or Complexity of Data Reviewed  Labs: ordered.  Decision-making details documented in ED Course.  Radiology: ordered and independent interpretation performed. Decision-making details documented in ED Course.  ECG/medicine tests: ordered and independent interpretation performed. Decision-making details documented in ED Course.        Disposition and Plan     Clinical Impression:  1. Hyperglycemia    2. Anal or rectal pain         Disposition:  Admit  4/23/2025  2:40 am    Follow-up:  No follow-up provider specified.        Medications Prescribed:  Current Discharge Medication List          Supplementary Documentation:         Hospital Problems       Present on Admission  Date Reviewed: 4/22/2025          ICD-10-CM Noted POA    * (Principal) Hyperglycemia R73.9 3/26/2025 Unknown

## 2025-04-23 NOTE — ED QUICK NOTES
Orders for admission, patient is aware of plan and ready to go upstairs. Any questions, please call ED RN Dionne at extension [phone].     Patient Covid vaccination status: Unvaccinated     COVID Test Ordered in ED: None    COVID Suspicion at Admission: N/A    Running Infusions: Medication Infusions[1]     Mental Status/LOC at time of transport: A&Ox4    Other pertinent information: Pt in a lot of pain. Reports chronic opioid use     CIWA score: N/A   NIH score:  N/A             [1]    sodium chloride 125 mL/hr at 04/23/25 0200

## 2025-04-24 VITALS
HEART RATE: 70 BPM | BODY MASS INDEX: 23 KG/M2 | RESPIRATION RATE: 16 BRPM | WEIGHT: 137.81 LBS | OXYGEN SATURATION: 96 % | DIASTOLIC BLOOD PRESSURE: 60 MMHG | TEMPERATURE: 98 F | SYSTOLIC BLOOD PRESSURE: 92 MMHG

## 2025-04-24 LAB
ANION GAP SERPL CALC-SCNC: 7 MMOL/L (ref 0–18)
BASOPHILS # BLD AUTO: 0.02 X10(3) UL (ref 0–0.2)
BASOPHILS NFR BLD AUTO: 0.5 %
BUN BLD-MCNC: <5 MG/DL (ref 9–23)
CALCIUM BLD-MCNC: 8.8 MG/DL (ref 8.7–10.6)
CHLORIDE SERPL-SCNC: 111 MMOL/L (ref 98–112)
CO2 SERPL-SCNC: 22 MMOL/L (ref 21–32)
CREAT BLD-MCNC: 0.58 MG/DL (ref 0.55–1.02)
CRP SERPL-MCNC: <0.4 MG/DL (ref ?–0.5)
EGFRCR SERPLBLD CKD-EPI 2021: 117 ML/MIN/1.73M2 (ref 60–?)
EOSINOPHIL # BLD AUTO: 0.05 X10(3) UL (ref 0–0.7)
EOSINOPHIL NFR BLD AUTO: 1.2 %
ERYTHROCYTE [DISTWIDTH] IN BLOOD BY AUTOMATED COUNT: 15.1 %
GLUCOSE BLD-MCNC: 162 MG/DL (ref 70–99)
GLUCOSE BLD-MCNC: 212 MG/DL (ref 70–99)
GLUCOSE BLD-MCNC: 307 MG/DL (ref 70–99)
HCT VFR BLD AUTO: 31.4 % (ref 35–48)
HGB BLD-MCNC: 10.2 G/DL (ref 12–16)
IMM GRANULOCYTES # BLD AUTO: 0.01 X10(3) UL (ref 0–1)
IMM GRANULOCYTES NFR BLD: 0.2 %
LYMPHOCYTES # BLD AUTO: 1.3 X10(3) UL (ref 1–4)
LYMPHOCYTES NFR BLD AUTO: 30.9 %
MCH RBC QN AUTO: 30.2 PG (ref 26–34)
MCHC RBC AUTO-ENTMCNC: 32.5 G/DL (ref 31–37)
MCV RBC AUTO: 92.9 FL (ref 80–100)
MONOCYTES # BLD AUTO: 0.41 X10(3) UL (ref 0.1–1)
MONOCYTES NFR BLD AUTO: 9.7 %
NEUTROPHILS # BLD AUTO: 2.42 X10 (3) UL (ref 1.5–7.7)
NEUTROPHILS # BLD AUTO: 2.42 X10(3) UL (ref 1.5–7.7)
NEUTROPHILS NFR BLD AUTO: 57.5 %
PLATELET # BLD AUTO: 312 10(3)UL (ref 150–450)
POTASSIUM SERPL-SCNC: 3.8 MMOL/L (ref 3.5–5.1)
PROCALCITONIN SERPL-MCNC: 0.07 NG/ML (ref ?–0.05)
RBC # BLD AUTO: 3.38 X10(6)UL (ref 3.8–5.3)
SODIUM SERPL-SCNC: 140 MMOL/L (ref 136–145)
WBC # BLD AUTO: 4.2 X10(3) UL (ref 4–11)

## 2025-04-24 PROCEDURE — 99233 SBSQ HOSP IP/OBS HIGH 50: CPT | Performed by: HOSPITALIST

## 2025-04-24 RX ORDER — HYDROMORPHONE HYDROCHLORIDE 1 MG/ML
0.2 INJECTION, SOLUTION INTRAMUSCULAR; INTRAVENOUS; SUBCUTANEOUS ONCE
Refills: 0 | Status: DISCONTINUED | OUTPATIENT
Start: 2025-04-24 | End: 2025-04-24

## 2025-04-24 RX ORDER — HYDROMORPHONE HYDROCHLORIDE 1 MG/ML
INJECTION, SOLUTION INTRAMUSCULAR; INTRAVENOUS; SUBCUTANEOUS
Status: DISCONTINUED
Start: 2025-04-24 | End: 2025-04-24

## 2025-04-24 RX ORDER — HYDROMORPHONE HYDROCHLORIDE 1 MG/ML
1 INJECTION, SOLUTION INTRAMUSCULAR; INTRAVENOUS; SUBCUTANEOUS
Refills: 0 | Status: DISCONTINUED | OUTPATIENT
Start: 2025-04-24 | End: 2025-04-24

## 2025-04-24 RX ORDER — HYDROCORTISONE ACETATE 25 MG/1
25 SUPPOSITORY RECTAL DAILY
Qty: 14 SUPPOSITORY | Refills: 2 | Status: SHIPPED | OUTPATIENT
Start: 2025-04-24

## 2025-04-24 RX ORDER — INSULIN DEGLUDEC 100 U/ML
35 INJECTION, SOLUTION SUBCUTANEOUS DAILY
Status: DISCONTINUED | OUTPATIENT
Start: 2025-04-25 | End: 2025-04-24

## 2025-04-24 RX ORDER — HYDROMORPHONE HYDROCHLORIDE 1 MG/ML
0.2 INJECTION, SOLUTION INTRAMUSCULAR; INTRAVENOUS; SUBCUTANEOUS EVERY 2 HOUR PRN
Refills: 0 | Status: DISCONTINUED | OUTPATIENT
Start: 2025-04-24 | End: 2025-04-24

## 2025-04-24 RX ORDER — HYDROMORPHONE HYDROCHLORIDE 1 MG/ML
1 INJECTION, SOLUTION INTRAMUSCULAR; INTRAVENOUS; SUBCUTANEOUS ONCE
Refills: 0 | Status: DISCONTINUED | OUTPATIENT
Start: 2025-04-24 | End: 2025-04-24

## 2025-04-24 RX ORDER — HYDROMORPHONE HYDROCHLORIDE 1 MG/ML
2 INJECTION, SOLUTION INTRAMUSCULAR; INTRAVENOUS; SUBCUTANEOUS ONCE
Refills: 0 | Status: COMPLETED | OUTPATIENT
Start: 2025-04-24 | End: 2025-04-24

## 2025-04-24 RX ORDER — DIBUCAINE 1% 1 G/100G
1 CREAM TOPICAL EVERY 8 HOURS PRN
Qty: 56 G | Refills: 1 | Status: SHIPPED | OUTPATIENT
Start: 2025-04-24

## 2025-04-24 RX ORDER — HYDROMORPHONE HYDROCHLORIDE 1 MG/ML
0.4 INJECTION, SOLUTION INTRAMUSCULAR; INTRAVENOUS; SUBCUTANEOUS EVERY 2 HOUR PRN
Refills: 0 | Status: DISCONTINUED | OUTPATIENT
Start: 2025-04-24 | End: 2025-04-24

## 2025-04-24 NOTE — PLAN OF CARE
Problem: hyperglycemia, rectal pain  Data: Patient alert and oriented overnight, on room air, reports rectal pain at an 8 on 1-10 pain scale, requesting IV pain medication, stating oral pain medication does not work for this type of pain. Patient denies bowel movement so far this evening, denies bleeding or rectal drainage. Patient up in room as tolerated, voiding freely, vital signs stable.   Action:Due medications given, prn pain medication with adequate relief, IVF and IV abx overnight. All patient's needs attended to.   Education (patient/family): Patient updated on plan of care. Plan for DC home with family when appropriate.   Response: Patient verbalizes understanding of plan of care and appears to be resting comfortably at this time.    Problem: RISK FOR INFECTION - ADULT  Goal: Absence of fever/infection during anticipated neutropenic period  Description: INTERVENTIONS- Monitor WBC- Administer growth factors as ordered- Implement neutropenic guidelines  Outcome: Progressing     Problem: SAFETY ADULT - FALL  Goal: Free from fall injury  Description: INTERVENTIONS:- Assess pt frequently for physical needs- Identify cognitive and physical deficits and behaviors that affect risk of falls.- Emmalena fall precautions as indicated by assessment.- Educate pt/family on patient safety including physical limitations- Instruct pt to call for assistance with activity based on assessment- Modify environment to reduce risk of injury- Provide assistive devices as appropriate- Consider OT/PT consult to assist with strengthening/mobility- Encourage toileting schedule  Outcome: Progressing     Problem: Patient/Family Goals  Goal: Patient/Family Long Term Goal  Description: Patient's Long Term Goal: DC home  Interventions:- comply with plan of care  - See additional Care Plan goals for specific interventions  Outcome: Progressing  Goal: Patient/Family Short Term Goal  Description: Patient's Short Term Goal: have good pain  control  Interventions: - prn pain medication  - See additional Care Plan goals for specific interventions  Outcome: Progressing     Problem: PAIN - ADULT  Goal: Verbalizes/displays adequate comfort level or patient's stated pain goal  Description: INTERVENTIONS:- Encourage pt to monitor pain and request assistance- Assess pain using appropriate pain scale- Administer analgesics based on type and severity of pain and evaluate response- Implement non-pharmacological measures as appropriate and evaluate response- Consider cultural and social influences on pain and pain management- Manage/alleviate anxiety- Utilize distraction and/or relaxation techniques- Monitor for opioid side effects- Notify MD/LIP if interventions unsuccessful or patient reports new pain- Anticipate increased pain with activity and pre-medicate as appropriate  Outcome: Progressing

## 2025-04-24 NOTE — CONSULTS
Attempted to see pt for consult however pt declined to stay and left AMA prior to examination.     Paola Baldwin, APRN  1:05 PM  4/24/2025  [facility]  [phone]

## 2025-04-24 NOTE — PLAN OF CARE
Pt decided to leave AMA once she found out the dose for dilaudid IV was cut down. She was advised to wait for the new GI consult that was ordered and to be seen by them but she responded \"they won't be able to do anything\". Dr. Diana notified and attempted was made for pt to wait for her response and any new plans, but pt did not want to wait and requested to sign AMA form. She then walked out of the unit.

## 2025-04-25 ENCOUNTER — PATIENT OUTREACH (OUTPATIENT)
Dept: CASE MANAGEMENT | Age: 40
End: 2025-04-25

## 2025-04-25 NOTE — PROGRESS NOTES
Hospital Follow up for TCC (Discharge 4/24 edw)     [facility]   [facility]   [address], [address]  [phone]   Pt declined to make follow up appt     Confirmed with pt   Closing encounter

## 2025-04-27 ENCOUNTER — HOSPITAL ENCOUNTER (OUTPATIENT)
Facility: HOSPITAL | Age: 40
Setting detail: OBSERVATION
Discharge: LEFT BEFORE DISCHARGE | End: 2025-04-29
Attending: EMERGENCY MEDICINE | Admitting: HOSPITALIST
Payer: COMMERCIAL

## 2025-04-27 ENCOUNTER — APPOINTMENT (OUTPATIENT)
Dept: CT IMAGING | Facility: HOSPITAL | Age: 40
End: 2025-04-27
Attending: EMERGENCY MEDICINE
Payer: COMMERCIAL

## 2025-04-27 DIAGNOSIS — R73.9 HYPERGLYCEMIA: Primary | ICD-10-CM

## 2025-04-27 DIAGNOSIS — K50.90 CROHN'S DISEASE WITHOUT COMPLICATION, UNSPECIFIED GASTROINTESTINAL TRACT LOCATION (HCC): ICD-10-CM

## 2025-04-27 LAB
ALBUMIN SERPL-MCNC: 4.2 G/DL (ref 3.2–4.8)
ALBUMIN/GLOB SERPL: 1.9 {RATIO} (ref 1–2)
ALP LIVER SERPL-CCNC: 107 U/L (ref 37–98)
ALT SERPL-CCNC: 10 U/L (ref 10–49)
ANION GAP SERPL CALC-SCNC: 6 MMOL/L (ref 0–18)
AST SERPL-CCNC: 20 U/L (ref ?–34)
BASE EXCESS BLDV CALC-SCNC: -1.5 MMOL/L
BASOPHILS # BLD AUTO: 0.04 X10(3) UL (ref 0–0.2)
BASOPHILS NFR BLD AUTO: 0.4 %
BILIRUB SERPL-MCNC: 0.2 MG/DL (ref 0.3–1.2)
BUN BLD-MCNC: 12 MG/DL (ref 9–23)
CALCIUM BLD-MCNC: 9.7 MG/DL (ref 8.7–10.6)
CHLORIDE SERPL-SCNC: 102 MMOL/L (ref 98–112)
CO2 SERPL-SCNC: 23 MMOL/L (ref 21–32)
CREAT BLD-MCNC: 0.91 MG/DL (ref 0.55–1.02)
EGFRCR SERPLBLD CKD-EPI 2021: 82 ML/MIN/1.73M2 (ref 60–?)
EOSINOPHIL # BLD AUTO: 0.04 X10(3) UL (ref 0–0.7)
EOSINOPHIL NFR BLD AUTO: 0.4 %
ERYTHROCYTE [DISTWIDTH] IN BLOOD BY AUTOMATED COUNT: 15.9 %
GLOBULIN PLAS-MCNC: 2.2 G/DL (ref 2–3.5)
GLUCOSE BLD-MCNC: 661 MG/DL (ref 70–99)
GLUCOSE BLD-MCNC: >600 MG/DL (ref 70–99)
HCG SERPL QL: NEGATIVE
HCO3 BLDV-SCNC: 23.5 MEQ/L (ref 22–26)
HCT VFR BLD AUTO: 31.3 % (ref 35–48)
HGB BLD-MCNC: 10.6 G/DL (ref 12–16)
IMM GRANULOCYTES # BLD AUTO: 0.03 X10(3) UL (ref 0–1)
IMM GRANULOCYTES NFR BLD: 0.3 %
LYMPHOCYTES # BLD AUTO: 1.29 X10(3) UL (ref 1–4)
LYMPHOCYTES NFR BLD AUTO: 13.5 %
MCH RBC QN AUTO: 30.8 PG (ref 26–34)
MCHC RBC AUTO-ENTMCNC: 33.9 G/DL (ref 31–37)
MCV RBC AUTO: 91 FL (ref 80–100)
MONOCYTES # BLD AUTO: 0.74 X10(3) UL (ref 0.1–1)
MONOCYTES NFR BLD AUTO: 7.7 %
NEUTROPHILS # BLD AUTO: 7.41 X10 (3) UL (ref 1.5–7.7)
NEUTROPHILS # BLD AUTO: 7.41 X10(3) UL (ref 1.5–7.7)
NEUTROPHILS NFR BLD AUTO: 77.7 %
OSMOLALITY SERPL CALC.SUM OF ELEC: 303 MOSM/KG (ref 275–295)
OXYHGB MFR BLDV: 88.5 % (ref 72–78)
PCO2 BLDV: 30 MM HG (ref 38–50)
PH BLDV: 7.46 [PH] (ref 7.33–7.43)
PLATELET # BLD AUTO: 370 10(3)UL (ref 150–450)
PO2 BLDV: 92 MM HG (ref 30–50)
POTASSIUM SERPL-SCNC: 4.8 MMOL/L (ref 3.5–5.1)
PROT SERPL-MCNC: 6.4 G/DL (ref 5.7–8.2)
RBC # BLD AUTO: 3.44 X10(6)UL (ref 3.8–5.3)
SODIUM SERPL-SCNC: 131 MMOL/L (ref 136–145)
WBC # BLD AUTO: 9.6 X10(3) UL (ref 4–11)

## 2025-04-27 PROCEDURE — 74177 CT ABD & PELVIS W/CONTRAST: CPT | Performed by: EMERGENCY MEDICINE

## 2025-04-27 RX ORDER — HYDROMORPHONE HYDROCHLORIDE 1 MG/ML
1 INJECTION, SOLUTION INTRAMUSCULAR; INTRAVENOUS; SUBCUTANEOUS EVERY 30 MIN PRN
Refills: 0 | Status: DISCONTINUED | OUTPATIENT
Start: 2025-04-27 | End: 2025-04-28

## 2025-04-27 RX ORDER — ONDANSETRON 2 MG/ML
4 INJECTION INTRAMUSCULAR; INTRAVENOUS ONCE
Status: COMPLETED | OUTPATIENT
Start: 2025-04-27 | End: 2025-04-27

## 2025-04-27 RX ORDER — INSULIN ASPART 100 [IU]/ML
0.2 INJECTION, SOLUTION INTRAVENOUS; SUBCUTANEOUS ONCE
Status: COMPLETED | OUTPATIENT
Start: 2025-04-27 | End: 2025-04-27

## 2025-04-28 PROBLEM — K62.5 GASTROINTESTINAL HEMORRHAGE ASSOCIATED WITH ANORECTAL SOURCE: Status: ACTIVE | Noted: 2025-04-28

## 2025-04-28 LAB
ADENOVIRUS F 40/41 PCR: NEGATIVE
ASTROVIRUS PCR: NEGATIVE
BILIRUB UR QL STRIP.AUTO: NEGATIVE
C CAYETANENSIS DNA SPEC QL NAA+PROBE: NEGATIVE
CAMPY SP DNA.DIARRHEA STL QL NAA+PROBE: NEGATIVE
CLARITY UR REFRACT.AUTO: CLEAR
COLOR UR AUTO: COLORLESS
CRP SERPL-MCNC: 3 MG/DL (ref ?–0.5)
CRYPTOSP DNA SPEC QL NAA+PROBE: NEGATIVE
EAEC PAA PLAS AGGR+AATA ST NAA+NON-PRB: NEGATIVE
EC STX1+STX2 + H7 FLIC SPEC NAA+PROBE: NEGATIVE
ENTAMOEBA HISTOLYTICA PCR: NEGATIVE
EPEC EAE GENE STL QL NAA+NON-PROBE: NEGATIVE
ETEC LTA+ST1A+ST1B TOX ST NAA+NON-PROBE: NEGATIVE
GIARDIA LAMBLIA PCR: NEGATIVE
GLUCOSE BLD-MCNC: 166 MG/DL (ref 70–99)
GLUCOSE BLD-MCNC: 174 MG/DL (ref 70–99)
GLUCOSE BLD-MCNC: 235 MG/DL (ref 70–99)
GLUCOSE BLD-MCNC: 273 MG/DL (ref 70–99)
GLUCOSE BLD-MCNC: 273 MG/DL (ref 70–99)
GLUCOSE BLD-MCNC: 299 MG/DL (ref 70–99)
GLUCOSE BLD-MCNC: 499 MG/DL (ref 70–99)
GLUCOSE UR STRIP.AUTO-MCNC: >1000 MG/DL
KETONES UR STRIP.AUTO-MCNC: 40 MG/DL
LACTATE SERPL-SCNC: 1.3 MMOL/L (ref 0.5–2)
LEUKOCYTE ESTERASE UR QL STRIP.AUTO: NEGATIVE
NITRITE UR QL STRIP.AUTO: NEGATIVE
NOROVIRUS GI/GII PCR: NEGATIVE
P SHIGELLOIDES DNA STL QL NAA+PROBE: NEGATIVE
PH UR STRIP.AUTO: 6.5 [PH] (ref 5–8)
PROT UR STRIP.AUTO-MCNC: NEGATIVE MG/DL
RBC UR QL AUTO: NEGATIVE
ROTAVIRUS A PCR: NEGATIVE
SALMONELLA DNA SPEC QL NAA+PROBE: NEGATIVE
SAPOVIRUS PCR: NEGATIVE
SHIGELLA SP+EIEC IPAH ST NAA+NON-PROBE: NEGATIVE
SP GR UR STRIP.AUTO: >1.03 (ref 1–1.03)
UROBILINOGEN UR STRIP.AUTO-MCNC: NORMAL MG/DL
V CHOLERAE DNA SPEC QL NAA+PROBE: NEGATIVE
VIBRIO DNA SPEC NAA+PROBE: NEGATIVE
YERSINIA DNA SPEC NAA+PROBE: NEGATIVE

## 2025-04-28 PROCEDURE — 99223 1ST HOSP IP/OBS HIGH 75: CPT | Performed by: HOSPITALIST

## 2025-04-28 RX ORDER — ESCITALOPRAM OXALATE 20 MG/1
20 TABLET ORAL DAILY
Status: DISCONTINUED | OUTPATIENT
Start: 2025-04-28 | End: 2025-04-29

## 2025-04-28 RX ORDER — NICOTINE POLACRILEX 4 MG
15 LOZENGE BUCCAL
Status: DISCONTINUED | OUTPATIENT
Start: 2025-04-28 | End: 2025-04-29

## 2025-04-28 RX ORDER — HYDROMORPHONE HYDROCHLORIDE 1 MG/ML
0.4 INJECTION, SOLUTION INTRAMUSCULAR; INTRAVENOUS; SUBCUTANEOUS EVERY 2 HOUR PRN
Refills: 0 | Status: DISCONTINUED | OUTPATIENT
Start: 2025-04-28 | End: 2025-04-28

## 2025-04-28 RX ORDER — NICOTINE POLACRILEX 4 MG
30 LOZENGE BUCCAL
Status: DISCONTINUED | OUTPATIENT
Start: 2025-04-28 | End: 2025-04-29

## 2025-04-28 RX ORDER — HYDROMORPHONE HYDROCHLORIDE 1 MG/ML
1 INJECTION, SOLUTION INTRAMUSCULAR; INTRAVENOUS; SUBCUTANEOUS EVERY 2 HOUR PRN
Refills: 0 | Status: DISCONTINUED | OUTPATIENT
Start: 2025-04-28 | End: 2025-04-29

## 2025-04-28 RX ORDER — ONDANSETRON 2 MG/ML
4 INJECTION INTRAMUSCULAR; INTRAVENOUS EVERY 6 HOURS PRN
Status: DISCONTINUED | OUTPATIENT
Start: 2025-04-28 | End: 2025-04-29

## 2025-04-28 RX ORDER — POLYETHYLENE GLYCOL 3350 17 G/17G
17 POWDER, FOR SOLUTION ORAL DAILY
Status: DISCONTINUED | OUTPATIENT
Start: 2025-04-28 | End: 2025-04-29

## 2025-04-28 RX ORDER — HYDROMORPHONE HYDROCHLORIDE 1 MG/ML
2 INJECTION, SOLUTION INTRAMUSCULAR; INTRAVENOUS; SUBCUTANEOUS EVERY 2 HOUR PRN
Refills: 0 | Status: DISCONTINUED | OUTPATIENT
Start: 2025-04-28 | End: 2025-04-29

## 2025-04-28 RX ORDER — ACETAMINOPHEN 500 MG
500 TABLET ORAL EVERY 4 HOURS PRN
Status: DISCONTINUED | OUTPATIENT
Start: 2025-04-28 | End: 2025-04-29

## 2025-04-28 RX ORDER — PROCHLORPERAZINE EDISYLATE 5 MG/ML
5 INJECTION INTRAMUSCULAR; INTRAVENOUS EVERY 8 HOURS PRN
Status: DISCONTINUED | OUTPATIENT
Start: 2025-04-28 | End: 2025-04-29

## 2025-04-28 RX ORDER — INSULIN DEGLUDEC 100 U/ML
15 INJECTION, SOLUTION SUBCUTANEOUS DAILY
Status: DISCONTINUED | OUTPATIENT
Start: 2025-04-28 | End: 2025-04-29

## 2025-04-28 RX ORDER — HYDROMORPHONE HYDROCHLORIDE 1 MG/ML
0.8 INJECTION, SOLUTION INTRAMUSCULAR; INTRAVENOUS; SUBCUTANEOUS EVERY 2 HOUR PRN
Refills: 0 | Status: DISCONTINUED | OUTPATIENT
Start: 2025-04-28 | End: 2025-04-28

## 2025-04-28 RX ORDER — DEXTROSE MONOHYDRATE 25 G/50ML
50 INJECTION, SOLUTION INTRAVENOUS
Status: DISCONTINUED | OUTPATIENT
Start: 2025-04-28 | End: 2025-04-29

## 2025-04-28 RX ORDER — HYDROMORPHONE HYDROCHLORIDE 1 MG/ML
1 INJECTION, SOLUTION INTRAMUSCULAR; INTRAVENOUS; SUBCUTANEOUS ONCE
Refills: 0 | Status: COMPLETED | OUTPATIENT
Start: 2025-04-28 | End: 2025-04-28

## 2025-04-28 RX ORDER — HYDROMORPHONE HYDROCHLORIDE 8 MG/1
8 TABLET ORAL EVERY 4 HOURS PRN
COMMUNITY

## 2025-04-28 RX ORDER — HYDROMORPHONE HYDROCHLORIDE 1 MG/ML
0.2 INJECTION, SOLUTION INTRAMUSCULAR; INTRAVENOUS; SUBCUTANEOUS EVERY 2 HOUR PRN
Refills: 0 | Status: DISCONTINUED | OUTPATIENT
Start: 2025-04-28 | End: 2025-04-28

## 2025-04-28 RX ORDER — HYDROMORPHONE HYDROCHLORIDE 1 MG/ML
0.5 INJECTION, SOLUTION INTRAMUSCULAR; INTRAVENOUS; SUBCUTANEOUS EVERY 2 HOUR PRN
Refills: 0 | Status: DISCONTINUED | OUTPATIENT
Start: 2025-04-28 | End: 2025-04-29

## 2025-04-28 RX ORDER — SODIUM CHLORIDE 9 MG/ML
INJECTION, SOLUTION INTRAVENOUS CONTINUOUS
Status: DISCONTINUED | OUTPATIENT
Start: 2025-04-28 | End: 2025-04-29

## 2025-04-28 NOTE — PLAN OF CARE
Patient reports still having severe rectum pain today and has been requesting dilaudid every 2 hours.  She states there is slight improvement overall from yesterday, but still reports struggling to get comfortable.  She does report that dilaudid does take the pain down to zero, but it comes right back.  Patient instructed to try to back off on pain meds if able.    VSS,  on going and has been wnl.  Patient instructed she can start on CLD, reports not hungry.    Patient did get advanced to [facility], and tolerated some ice cream.    Problem: PAIN - ADULT  Goal: Verbalizes/displays adequate comfort level or patient's stated pain goal  Description: INTERVENTIONS:- Encourage pt to monitor pain and request assistance- Assess pain using appropriate pain scale- Administer analgesics based on type and severity of pain and evaluate response- Implement non-pharmacological measures as appropriate and evaluate response- Consider cultural and social influences on pain and pain management- Manage/alleviate anxiety- Utilize distraction and/or relaxation techniques- Monitor for opioid side effects- Notify MD/LIP if interventions unsuccessful or patient reports new pain- Anticipate increased pain with activity and pre-medicate as appropriate  Outcome: Progressing      Problem: GASTROINTESTINAL - ADULT  Goal: Maintains or returns to baseline bowel function  Description: INTERVENTIONS:- Assess bowel function- Maintain adequate hydration with IV or PO as ordered and tolerated- Evaluate effectiveness of GI medications- Encourage mobilization and activity- Obtain nutritional consult as needed- Establish a toileting routine/schedule- Consider collaborating with pharmacy to review patient's medication profile  Outcome: Progressing

## 2025-04-28 NOTE — CONSULTS
[facility]                       Gastroenterology Consultation-[facility]    Esperanza Mauro Patient Status:  Observation    1985 MRN [id number]   Location [facility] 3NE-A Attending Serge Liu,    Hosp Day # 0 PCP None Pcp     Reason for consultation: GI bleeding   HPI: This is a 40 yr-old male with complex PMhx that includes uncontrolled DM (A1c 9.7) and Crohn's disease of the colon (Dx 8 years ago; reports follow-up with established general surgeon at [facility] who prescribes Remicade 7.5 mg/every 12 weeks--last infusion 7 weeks ago) who carries a history of perianal disease C/B seton placement (reports 70+ surgeries at [facility]) who underwent anorectal exam under anesthesia 4/3 at [facility] for chronic anorectal pain which revealed left posterolateral intersphincteric fistula s/p seton placement of tract + perianal + pudendal block was administered for pain control--Of note, pt had concern at the time for possible vaginal fistula and no additional tracts noted in the anorectum--who has since required admission to [facility] x 2 this month for persistent rectal pain s/p anal exam under anesthesia with completion fistulotomy on 2025 by Dr. Gibson--of note no vaginal fistula noted on exam.  Patient once again returned last week for ongoing rectal pain however left AMA.  Patient returned to ER yesterday with acute on chronic rectal pain, LLQ abdominal pain, and hematochezia.  Patient reports longstanding LLQ pain/rectal pain typically controlled at home with oral Dilaudid +/- oral Norco.  She reports worsening symptoms over the last several days which progressed to nausea with nonbloody vomiting, and constipation with BMs streaked with red blood.  No diarrhea, melena, fever, or chills.  Most recent flexible sigmoidoscopy 2024; Dr. SONNY MCARTHUR; [facility]) for abdominal pain, diarrhea, blood in stool revealed normal left colon.  Since  admission, patient requiring Dilaudid every 2 hours and has passed a hard formed stool with a small volume of red blood on stool.  PMHx: Past Medical History[1]             PSHx: Past Surgical History[2]  Medications: Current Medications[3]  Allergies: Allergies[4]  Social HX: Short Social Hx on File[5]   FamHx: The patient has no family history of colon cancer or other gastrointestinal malignancies;  No family history of ulcer disease, or inflammatory bowel disease  ROS:  In addition to the pertinent positives described above:            Infectious Disease:  No chronic infections or recent fevers prior to the acute illness            Cardiovascular: No history of CAD, prior MI, chest pain, or palpitations            Respiratory: No shortness of breath, asthma, copd, recurrent pneumonia            Hematologic: The patient reports no easy bruising, frequent gum bleeding or nose bleeding;  The patient has no history of known chronic anemia            Dermatologic: The patient reports no recent rashes or chronic skin disorders            Rheumatologic: The patient reports no history of chronic arthritis, myalgias, arthralgias            Genitourinary:  The patient reports no history of recurrent urinary tract infections, hematuria, dysuria, or nephrolithiasis           Psychiatric: + depression, anxiety           Oncologic: The patient reports no history of prior solid tumor or hematologic malignancy           ENT: The patient reports no hoarseness of voice, hearing loss, sinus congestion, tinnitus           Neurologic: The patient reports no history of seizure, stroke, or frequent headaches  PE: BP 95/62   Pulse 84   Temp 98.5 °F (36.9 °C) (Oral)   Resp 18   Wt 140 lb 12.8 oz (63.9 kg)   LMP 10/01/2024   SpO2 97%   BMI 23.43 kg/m²   Gen: AAO x 3, able to speak in complete sentences  HENT: EOMI, PERRL, oropharynx is clear with moist mucosal membranes  Eyes: Sclerae are anicteric  Neck:  Supple without nuchal  rigidity  CV: Regular rate and rhythm, with normal S1 and S2  Resp: Clear to auscultation bilaterally without wheezes; rubs, rhonchi, or rales  Abdomen: Soft, mild LLQ tenderness, non-distended with the presence of bowel sounds; No hepatosplenomegaly; no rebound or guarding; No ascites is clinically apparent; no tympany to percussion  Ext: No peripheral edema or cyanosis  Skin: Warm and dry  Psychiatric: Appropriate mood and congruent affect without obvious depression or anxiety  Labs:   Lab Results   Component Value Date    WBC 9.6 04/27/2025    HGB 10.6 04/27/2025    HCT 31.3 04/27/2025    .0 04/27/2025    CREATSERUM 0.91 04/27/2025    BUN 12 04/27/2025     04/27/2025    K 4.8 04/27/2025     04/27/2025    CO2 23.0 04/27/2025     04/27/2025    CA 9.7 04/27/2025    ALB 4.2 04/27/2025    ALKPHO 107 04/27/2025    BILT 0.2 04/27/2025    AST 20 04/27/2025    ALT 10 04/27/2025     Recent Labs   Lab 04/22/25  2348 04/24/25  0904 04/27/25  2310   * 212* 661*   BUN 16 <5* 12   CREATSERUM 1.08* 0.58 0.91   CA 9.4 8.8 9.7   * 140 131*   K 4.3 3.8 4.8    111 102   CO2 23.0 22.0 23.0     Recent Labs   Lab 04/27/25  2310   RBC 3.44*   HGB 10.6*   HCT 31.3*   MCV 91.0   MCH 30.8   MCHC 33.9   RDW 15.9   NEPRELIM 7.41   WBC 9.6   .0       Recent Labs   Lab 04/22/25  2348 04/27/25  2310   ALT 12 10   AST 19 20       Imaging:   PROCEDURE:  CT ABDOMEN+PELVIS (CONTRAST ONLY) (CPT=74177)     COMPARISON:  [facility] , CT, CT ABDOMEN+PELVIS(CONTRAST ONLY)(CPT=74177), 4/08/2025, 9:50 PM.  [facility] , CT, CT ABDOMEN+PELVIS(CONTRAST ONLY)(CPT=74177), 3/26/2025, 4:06 AM.  [facility] , CT, CT ABDOMEN+PELVIS(CONTRAST ONLY)(CPT=74177), 4/23/2025, 1:29 AM.     INDICATIONS:  Increasing rectal pain.  History rectal fistula.  blood sugars over 600, chrons flare also     TECHNIQUE:  CT scanning was performed from the dome of the diaphragm to the pubic symphysis with non-ionic intravenous contrast material.  Post contrast coronal MPR imaging was performed.  Dose reduction techniques were used. Dose information is  transmitted to the ACR (American College of Radiology) NRDR (National Radiology Data Registry) which includes the Dose Index Registry.     PATIENT STATED HISTORY:(As transcribed by Technologist)  Pt arrives with c/o Chron's flare up, increased rectal pain. Post op 2 weeks of rectal fistula.      CONTRAST USED:  80cc of Isovue 370     FINDINGS:        LIVER:  Stable probable tiny cyst in the liver..     BILIARY:  Unremarkable.     PANCREAS:  Unremarkable.     SPLEEN:  Unremarkable.     KIDNEYS:  Redemonstration cyst upper pole bilateral kidney.  A low-attenuation bilobed structure stable in the left mid kidney about 2.2 cm.  This may reflect a proteinaceous cyst , and suggest non emergency outpatient ultrasound for confirmation of cyst   and exclude solid nodule.  No hydronephrosis.     ADRENALS:  Unremarkable.     AORTA/VASCULAR:  No aortic aneurysm.     RETROPERITONEUM:  Unremarkable.     BOWEL/MESENTERY/PELVIC ORGANS:  No definite perirectal abscess identified.  Axial image 96 small amount of air present about 9 mm in size just left of the rectum, in the region of the lower uterine segment/upper vagina, note that the cervix is left of  midline as well.  IUD present within the uterus stable position.  Large amount of stool throughout the colon increasing since the prior, including increased density especially distal colon consistent with desiccation.  Normal appendix.  No sign of small  bowel obstruction ascites or free air.  Large amount of retained food material in the stomach.  No adnexal mass.  No gas in the uterus.     ABDOMINAL WALL:  Unremarkable.     URINARY BLADDER:  Unremarkable.     LYMPH NODES PELVIS:  Unremarkable.        LUNG BASES:  No acute process.     BONES:  No acute abnormality.       Impression   CONCLUSION:       No perirectal abscess identified; there is a small gas structure toward the  left of the rectum, 9 mm.  This is in the region just below the lower uterine segment and may be small amount of air within the vagina.  Whether this air is physiologic or may  reflect a fistula from the rectum is very difficult to determine on this CT.  Given the previous surgery, advise surgery referral for follow-up.  A distinct abscess is not seen.  No bowel obstruction ascites or free air.  Increasing desiccated stool  likely reflecting constipation.  Other stable findings as above.        LOCATION:  [facility]      Dictated by (CST): Nico Kaur MD on 4/27/2025 at 11:56 PM      Finalized by (CST): Nico Kaur MD on 4/28/2025 at 0:05 AM     Impression: 40-year-old female with Hx of uncontrolled DM and Crohn's disease of the colon who reports Remicade every 12 weeks and longstanding history of fistulas requiring seton placement who underwent to anal rectal exams under anesthesia this month return to the ER yesterday with acute on chronic abdominal pain.    Will await stool studies for acute infection, check CRP, check infliximab level/antibody as patient reports last infusion 7 weeks ago.  CT suggest possible vaginal fistula--no current symptoms and patient underwent exam under anesthesia x 2 this month with no evidence of vaginal fistula.    Recommendations:     Trial of a clear liquid diet encouraging smaller more frequent meals  CRP and infliximab level/antibody to blood in lab   Await stool study results  MiraLAX 17 g p.o. daily given constipation and large narcotic requirements  Pain management per hospitalist recommendations limiting narcotics as able  Monitor for overt GI bleeding--no need for occult stool testing  No plans for steroids at this time given no active inflammation noted on CT and uncontrolled blood sugars  CBC in a.m. transfusing as needed to maintain Hgb greater than 7  Recommend ongoing follow-up with [facility]    Thank you for the consultation, we will follow the patient with  you.  Attending addendum (Dr Martel) to follow later today and provide formal, final recommendations at that time   CARLOS A Ghosh  10:54 AM  4/28/2025  [facility]  [phone]      Physician Addendum  This patient was seen and examined independently, then discussed with BHARATH Ghosh.  The plan was discussed with BHARATH and her note above was reviewed.  In summary, pt with history of Crohn's as outlined above.  She also has poorly controlled DM, has chronic pain with opiate dependence.  She reports ongoing pain in rectum and LLQ.  On exam, mildly tender in the LLQ.  Non-toxic appearing, remainder of abdomen is soft, no guarding.  CT reviewed, no obvious inflammatory change.  Hold of on steroids in setting of blood glucose > 600 and no inflammatory change noted on CT.  If no improvement, plan for flex sig.     David Martel MD         [1]   Past Medical History:   Crohn's disease (HCC)    Hidradenitis suppurativa    Type 1 diabetes mellitus (HCC)   [2]   Past Surgical History:  Procedure Laterality Date    I&d deep absc neck/chst+rib cut      Other surgical history  04/02/2025    [facility]. rectal fistula repair    Repair of anal fistula w/fibrin glue     [3]    [COMPLETED] HYDROmorphone (Dilaudid) 1 MG/ML injection 1 mg  1 mg Intravenous Once    escitalopram (Lexapro) tab 20 mg  20 mg Oral Daily    glucose (Dex4) 15 GM/59ML oral liquid 15 g  15 g Oral Q15 Min PRN    Or    glucose (Glutose) 40% oral gel 15 g  15 g Oral Q15 Min PRN    Or    glucose-vitamin C (Dex-4) chewable tab 4 tablet  4 tablet Oral Q15 Min PRN    Or    dextrose 50% injection 50 mL  50 mL Intravenous Q15 Min PRN    Or    glucose (Dex4) 15 GM/59ML oral liquid 30 g  30 g Oral Q15 Min PRN    Or    glucose (Glutose) 40% oral gel 30 g  30 g Oral Q15 Min PRN    Or    glucose-vitamin C (Dex-4) chewable tab 8 tablet  8 tablet Oral Q15 Min PRN    melatonin tab 3 mg  3 mg Oral Nightly PRN    sodium chloride 0.9% infusion    Intravenous Continuous    acetaminophen (Tylenol Extra Strength) tab 500 mg  500 mg Oral Q4H PRN    ondansetron (Zofran) 4 MG/2ML injection 4 mg  4 mg Intravenous Q6H PRN    prochlorperazine (Compazine) 10 MG/2ML injection 5 mg  5 mg Intravenous Q8H PRN    insulin degludec (Tresiba) 100 units/mL flextouch 15 Units  15 Units Subcutaneous Daily    insulin aspart (NovoLOG) 100 Units/mL FlexPen 1-10 Units  1-10 Units Subcutaneous TID AC and HS    HYDROmorphone (Dilaudid) 1 MG/ML injection 0.5 mg  0.5 mg Intravenous Q2H PRN    Or    HYDROmorphone (Dilaudid) 1 MG/ML injection 1 mg  1 mg Intravenous Q2H PRN    Or    HYDROmorphone (Dilaudid) 1 MG/ML injection 2 mg  2 mg Intravenous Q2H PRN    [COMPLETED] insulin aspart (NovoLOG) 100 Units/mL vial 13 Units  0.2 Units/kg Subcutaneous Once    [COMPLETED] sodium chloride 0.9 % IV bolus 1,000 mL  1,000 mL Intravenous Once    [COMPLETED] ondansetron (Zofran) 4 MG/2ML injection 4 mg  4 mg Intravenous Once    [COMPLETED] iopamidol 76% (ISOVUE-370) injection for power injector  80 mL Intravenous ONCE PRN   [4]   Allergies  Allergen Reactions    Vancomycin ANAPHYLAXIS    Morphine RASH    Toradol [Ketorolac Tromethamine] RASH   [5]   Social History  Socioeconomic History    Marital status:    Tobacco Use    Smoking status: Never    Smokeless tobacco: Never   Vaping Use    Vaping status: Never Used   Substance and Sexual Activity    Alcohol use: Not Currently    Drug use: Never     Social Drivers of Health     Food Insecurity: No Food Insecurity (4/28/2025)    NCSS - Food Insecurity     Worried About Running Out of Food in the Last Year: No     Ran Out of Food in the Last Year: No   Transportation Needs: No Transportation Needs (4/28/2025)    NCSS - Transportation     Lack of Transportation: No   Housing Stability: Not At Risk (4/28/2025)    NCSS - Housing/Utilities     Has Housing: Yes     Worried About Losing Housing: No     Unable to Get Utilities: No

## 2025-04-28 NOTE — H&P
[facility]     Esperanza Mauro Patient Status:  Observation    1985 MRN [id number]   Location [facility] 3NE-A Attending Jordan dAdison*   Hosp Day # 0 PCP None Pcp     Chief Complaint: Vomiting and diarrhea    Subjective:    History of Present Illness:     Esperanza Mauro is a 40 year old female with history of Crohn's, type 1 diabetes, fistula status post fistulotomy for 1125, depression presents emergency room with diarrhea with bright red blood.  Patient for episode yesterday and is also had 4 episodes of bowel.  Patient is on Remicade no fevers, chills.  No chest pain, shortness of breath, palpitations.  No dizziness, lightheadedness, or syncope    History/Other:    Past Medical History:  Past Medical History[1]  Past Surgical History:   Past Surgical History[2]   Family History:   Family History[3]  Social History:    reports that she has never smoked. She has never used smokeless tobacco. She reports that she does not currently use alcohol. She reports that she does not use drugs.     Allergies: Allergies[4]    Medications:  Medications Ordered Prior to Encounter[5]    Review of Systems:   A comprehensive review of systems was completed.    Pertinent positives and negatives noted in the HPI.    Objective:   Physical Exam:    BP 98/57   Pulse 85   Temp 98.2 °F (36.8 °C) (Oral)   Resp 16   Wt 140 lb 12.8 oz (63.9 kg)   LMP 10/01/2024   SpO2 98%   BMI 23.43 kg/m²   General: No acute distress, Alert  Respiratory: No rhonchi, no wheezes  Cardiovascular: S1, S2. Regular rate and rhythm  Abdomen: Soft, Non-tender, non-distended, positive bowel sounds  Neuro: No new focal deficits  Extremities: No edema      Results:    Labs:      Labs Last 24 Hours:    Recent Labs   Lab 25  2348 25  0904 25  2310   RBC 3.58* 3.38* 3.44*   HGB 11.1* 10.2* 10.6*   HCT 32.3* 31.4* 31.3*   MCV 90.2 92.9 91.0   MCH 31.0 30.2 30.8   MCHC 34.4 32.5 33.9   RDW  15.0 15.1 15.9   NEPRELIM 5.76 2.42 7.41   WBC 7.8 4.2 9.6   .0 312.0 370.0       Recent Labs   Lab 04/22/25  2348 04/24/25  0904 04/27/25  2310   * 212* 661*   BUN 16 <5* 12   CREATSERUM 1.08* 0.58 0.91   EGFRCR 67 117 82   CA 9.4 8.8 9.7   ALB 4.5  --  4.2   * 140 131*   K 4.3 3.8 4.8    111 102   CO2 23.0 22.0 23.0   ALKPHO 97  --  107*   AST 19  --  20   ALT 12  --  10   BILT 0.2*  --  0.2*   TP 6.6  --  6.4       Estimated Glomerular Filtration Rate: 82 mL/min/1.73m2 (result from lab).    No results found for: \"PT\", \"INR\"    No results for input(s): \"TROP\", \"TROPHS\", \"CK\" in the last 168 hours.    No results for input(s): \"TROP\", \"PBNP\" in the last 168 hours.    Recent Labs   Lab 04/24/25  0904   PCT 0.07*       Imaging: Imaging data reviewed in Epic.    Assessment & Plan:      # GI bleed with diarrhea  - C.diff ordered and pending  - continue IVF  - GI consult  - NPO except meds    # Nausea and vomiting  - continue on antiemetics    # Type 2 diabetes  - Will place on hypoglycemia protocol with long-acting insulin and correction factor insulin.    # Depression  - Continue on Lexapro.    Plan of care discussed with patient at bedside    Jordan Addison, DO    Supplementary Documentation:     The 21st Century Cures Act makes medical notes like these available to patients in the interest of transparency. Please be advised this is a medical document. Medical documents are intended to carry relevant information, facts as evident, and the clinical opinion of the practitioner. The medical note is intended as peer to peer communication and may appear blunt or direct. It is written in medical language and may contain abbreviations or verbiage that are unfamiliar.                                     [1]   Past Medical History:   Crohn's disease (HCC)    Hidradenitis suppurativa    Type 1 diabetes mellitus (HCC)   [2]   Past Surgical History:  Procedure Laterality Date    I&d deep absc  neck/chst+rib cut      Other surgical history  04/02/2025    [facility]. rectal fistula repair    Repair of anal fistula w/fibrin glue     [3] History reviewed. No pertinent family history.  [4]   Allergies  Allergen Reactions    Vancomycin ANAPHYLAXIS    Morphine RASH    Toradol [Ketorolac Tromethamine] RASH   [5]   Current Facility-Administered Medications on File Prior to Encounter   Medication Dose Route Frequency Provider Last Rate Last Admin    [COMPLETED] HYDROmorphone (Dilaudid) 1 MG/ML injection 2 mg  2 mg Intravenous Once Sarah Beth Diana MD   2 mg at 04/24/25 1216    [COMPLETED] HYDROmorphone (Dilaudid) 1 MG/ML injection 0.5 mg  0.5 mg Intravenous Once Brent Chatterjee MD   0.5 mg at 04/23/25 0028    [COMPLETED] insulin aspart (NovoLOG) 100 Units/mL vial 13 Units  0.2 Units/kg Subcutaneous Once Brent Chatterjee MD   13 Units at 04/23/25 0056    [COMPLETED] iopamidol 76% (ISOVUE-370) injection for power injector  80 mL Intravenous ONCE PRN Brent Chatterjee MD   80 mL at 04/23/25 0132    [COMPLETED] HYDROmorphone (Dilaudid) 1 MG/ML injection 1 mg  1 mg Intravenous Once Brent Chatterjee MD   1 mg at 04/23/25 0210    [COMPLETED] insulin aspart (NovoLOG) 100 Units/mL vial 13 Units  0.2 Units/kg Subcutaneous Once Brent Chatterjee MD   13 Units at 04/23/25 0249    [COMPLETED] piperacillin-tazobactam (Zosyn) 4.5 g in dextrose 5% 100 mL IVPB-ADDV  4.5 g Intravenous Once Brent Chatterjee MD   Stopped at 04/23/25 0320    [COMPLETED] sodium chloride 0.9 % IV bolus 1,000 mL  1,000 mL Intravenous Once Brent Chatterjee MD   Stopped at 04/23/25 0155    [COMPLETED] HYDROmorphone (Dilaudid) 1 MG/ML injection 0.5 mg  0.5 mg Intravenous Q30 Min PRN Brent Chatterjee MD   0.5 mg at 04/23/25 1028    [COMPLETED] ondansetron (Zofran) 4 MG/2ML injection 4 mg  4 mg Intravenous Once Brent Chatterjee MD   4 mg at 04/23/25 0021    [COMPLETED] HYDROmorphone (Dilaudid) 1 MG/ML injection 2 mg  2 mg Intravenous Once Sylvain  DO [facility]   2 mg at 04/12/25 1235    [COMPLETED] meperidine (Demerol) 25 MG/ML injection 12.5 mg  12.5 mg Intravenous PRN Areli Hernandez CRNA   12.5 mg at 04/11/25 1221    [COMPLETED] glucose-vitamin C (Dex-4) 4-6 g-mg chewable tab        Given at 04/09/25 0035    [COMPLETED] HYDROmorphone (Dilaudid) 1 MG/ML injection 1 mg  1 mg Intravenous Once Katina Ambrosio MD   1 mg at 04/09/25 0145    [COMPLETED] sodium chloride 0.9 % IV bolus 1,000 mL  1,000 mL Intravenous Once Rubina Powell MD   Stopped at 04/08/25 2210    [COMPLETED] HYDROmorphone (Dilaudid) 1 MG/ML injection 1 mg  1 mg Intravenous Once Rubina Powell MD   1 mg at 04/08/25 2110    [COMPLETED] insulin aspart (NovoLOG) 100 Units/mL vial 13 Units  0.2 Units/kg Subcutaneous Once Rubina Powell MD   13 Units at 04/08/25 2118    [COMPLETED] iopamidol 76% (ISOVUE-370) injection for power injector  75 mL Intravenous ONCE PRN Rubina Powell MD   75 mL at 04/08/25 2206    [COMPLETED] HYDROmorphone (Dilaudid) 1 MG/ML injection 1 mg  1 mg Intravenous Once Rubina Powell MD   1 mg at 04/08/25 2141    [COMPLETED] HYDROmorphone (Dilaudid) 1 MG/ML injection 1 mg  1 mg Intravenous Once Rubina Powell MD   1 mg at 04/08/25 2256    [COMPLETED] metroNIDAZOLE in sodium chloride 0.79% (Flagyl) 5 mg/mL IVPB premix 500 mg  500 mg Intravenous Once Rubina Powell  mL/hr at 04/09/25 0003 500 mg at 04/09/25 0003    [COMPLETED] glucose (Glutose) 40% oral gel        Given at 04/08/25 2350    [COMPLETED] sodium ferric gluconate (Ferrlecit) 125 mg in sodium chloride 0.9% 100mL IVPB premix  125 mg Intravenous Once Belgica Garcia  mL/hr at 03/27/25 1132 125 mg at 03/27/25 1132    [COMPLETED] sodium chloride 0.9 % IV bolus 1,000 mL  1,000 mL Intravenous Once Brandt Garcia MD   Stopped at 03/26/25 0524    [COMPLETED] insulin aspart (NovoLOG) 100 Units/mL vial 14 Units  0.2 Units/kg Subcutaneous Once Brandt Garcia MD   14 Units at 03/26/25 0335    [COMPLETED]  ondansetron (Zofran) 4 MG/2ML injection 4 mg  4 mg Intravenous Once Brandt Garcia MD   4 mg at 03/26/25 0338    [COMPLETED] HYDROmorphone (Dilaudid) 1 MG/ML injection 1 mg  1 mg Intravenous Once Brandt Garcia MD   1 mg at 03/26/25 0327    [COMPLETED] iopamidol 76% (ISOVUE-370) injection for power injector  80 mL Intravenous ONCE PRN Brandt Garcia MD   80 mL at 03/26/25 0414     Current Outpatient Medications on File Prior to Encounter   Medication Sig Dispense Refill    HYDROmorphone 8 MG Oral Tab Take 1 tablet (8 mg total) by mouth every 4 (four) hours as needed for Pain.      dibucaine 1 % External Ointment Apply 1 Application topically every 8 (eight) hours as needed. 56 g 1    hydrocortisone 25 MG Rectal Suppos Place 1 suppository (25 mg total) rectally daily. 14 suppository 2    HYDROcodone-acetaminophen  MG Oral Tab Take 1 tablet by mouth every 6 (six) hours as needed for Pain.      escitalopram 20 MG Oral Tab Take 1 tablet (20 mg total) by mouth in the morning.      Amphetamine-Dextroamphet ER 20 MG Oral Capsule SR 24 Hr Take 1 capsule (20 mg total) by mouth every morning. (Patient taking differently: Take 1 capsule (20 mg total) by mouth daily as needed.)      amphetamine-dextroamphetamine 10 MG Oral Tab Take 1 tablet (10 mg total) by mouth daily. (Patient taking differently: Take 1 tablet (10 mg total) by mouth daily as needed.)      HUMALOG KWIKPEN 100 UNIT/ML Subcutaneous Solution Pen-injector Inject 8-14 Units into the skin 3 (three) times daily with meals. Plus sliding scale. Carb ratio 1-6 grams. Correction 1-30. Total 50 units daily. (Patient taking differently: Inject 3-6 Units into the skin in the morning and 3-6 Units at noon and 3-6 Units in the evening. Inject with meals. Per sliding scale .)      Naloxone HCl 4 MG/0.1ML Nasal Liquid 4 mg by Nasal route as needed. If patient remains unresponsive, repeat dose in other nostril 2-5 minutes after first dose. 1 kit 0     polyethylene glycol, PEG 3350, 17 g Oral Powd Pack Take 17 g by mouth daily. (Patient not taking: Reported on 4/23/2025) 510 g 2    inFLIXimab (REMICADE IV)       TRESIBA FLEXTOUCH 100 UNIT/ML Subcutaneous Solution Pen-injector Inject 40 Units into the skin daily. Prime 2 units before each dose (Patient taking differently: Inject 15 Units into the skin in the morning and 15 Units before bedtime. 15 units in the morning and 15 units nightly .)

## 2025-04-28 NOTE — ED PROVIDER NOTES
Patient Seen in: [facility] Emergency Department      History     Chief Complaint   Patient presents with    Hyperglycemia    Nausea/Vomiting/Diarrhea     Stated Complaint: blood sugars over 600, chrons flare also    Subjective:   HPI    Patient is a 40-year-old female presents to ED for evaluation of vomiting, hyperglycemia.  Patient is a type I diabetic on Humalog and Tresiba.  Patient states she has been vomiting today 4 times.  She has had decreased oral intake the last couple days.  Still been taking her diabetic medication.  Patient had fistulotomy 4/11/25 here at [facility].  She states she had some bleeding for couple days after and then stool returned to normal.  She had 2 solid stools yesterday and then today she started having diarrhea with blood in the toilet water.  About 4 episodes of diarrhea today.  She been seen by [facility] about a month earlier and setons were placed.  Patient complains of rectal bleeding last couple days.  She has some mild left lower quadrant pain.  She is on Remicade.  History of DKA in the past.  No shortness of breath.  History of Present Illness               Objective:     Past Medical History:    Crohn's disease (HCC)    Hidradenitis suppurativa    Type 1 diabetes mellitus (HCC)              Past Surgical History:   Procedure Laterality Date    I&d deep absc neck/chst+rib cut      Other surgical history  04/02/2025    [facility]. rectal fistula repair    Repair of anal fistula w/fibrin glue                  Social History     Socioeconomic History    Marital status:    Tobacco Use    Smoking status: Never    Smokeless tobacco: Never   Vaping Use    Vaping status: Never Used   Substance and Sexual Activity    Alcohol use: Not Currently    Drug use: Never     Social Drivers of Health     Food Insecurity: No Food Insecurity (4/28/2025)    NCSS - Food Insecurity     Worried About Running Out of Food in the Last Year: No     Ran Out of Food in  the Last Year: No   Transportation Needs: No Transportation Needs (4/28/2025)    NCSS - Transportation     Lack of Transportation: No   Housing Stability: Not At Risk (4/28/2025)    NCSS - Housing/Utilities     Has Housing: Yes     Worried About Losing Housing: No     Unable to Get Utilities: No                                Physical Exam     ED Triage Vitals [04/27/25 2213]   /66   Pulse 107   Resp 18   Temp 98.2 °F (36.8 °C)   Temp src Oral   SpO2 97 %   O2 Device None (Room air)       Current Vitals:   Vital Signs  BP: 98/57  Pulse: 85  Resp: 16  Temp: 98.2 °F (36.8 °C)  Temp src: Oral  MAP (mmHg): 70    Oxygen Therapy  SpO2: 98 %  O2 Device: None (Room air)        Physical Exam  GENERAL: No acute distress, well appearing and non-toxic, Alert and oriented X 3   HEENT: Normocephalic, atraumatic.  Moist mucous membranes.  Pupils equal round reactive to light and accommodation, extraocular motion is intact, sclerae white, conjunctiva is pink.  Oropharynx is unremarkable, no exudate.  NECK: Supple, trachea midline, no lymphadenopathy.   LUNG: Lungs clear to auscultation bilaterally, no wheezing, no rales, no rhonchi.  CARDIOVASCULAR: Regular rate and rhythm.  Normal S1S2.  No S3S4 or murmur.  ABDOMEN: Bowel sounds are present. Soft. nondistended, no pulsatile masses.  Mild left lower quadrant tenderness.  No rebound, guarding or rigidity  Physical Exam                ED Course     Labs Reviewed   COMP METABOLIC PANEL (14) - Abnormal; Notable for the following components:       Result Value    Glucose 661 (*)     Sodium 131 (*)     Calculated Osmolality 303 (*)     Alkaline Phosphatase 107 (*)     Bilirubin, Total 0.2 (*)     All other components within normal limits   CBC WITH DIFFERENTIAL WITH PLATELET - Abnormal; Notable for the following components:    RBC 3.44 (*)     HGB 10.6 (*)     HCT 31.3 (*)     All other components within normal limits   VENOUS BLOOD GAS - Abnormal; Notable for the following  components:    Venous pH 7.46 (*)     Venous pCO2 30 (*)     Venous pO2 92 (*)     Venous O2Hb 88.5 (*)     All other components within normal limits   ACETONE - Abnormal; Notable for the following components:    Acetone Small (*)     All other components within normal limits   URINALYSIS, ROUTINE - Abnormal; Notable for the following components:    Urine Color Colorless (*)     Spec Gravity >1.030 (*)     Glucose Urine >1000 (*)     Ketones Urine 40 (*)     All other components within normal limits   POCT GLUCOSE - Abnormal; Notable for the following components:    POC Glucose >600 (*)     All other components within normal limits   POCT GLUCOSE - Abnormal; Notable for the following components:    POC Glucose 499 (*)     All other components within normal limits   POCT GLUCOSE - Abnormal; Notable for the following components:    POC Glucose 273 (*)     All other components within normal limits   LACTIC ACID, PLASMA - Normal   HCG, BETA SUBUNIT, QUAL - Normal   RAINBOW DRAW GOLD   RAINBOW DRAW BLUE   BLOOD CULTURE   BLOOD CULTURE   C. DIFFICILE(TOXIGENIC)PCR   Glucose 661.  Sodium 131.  Hemoglobin 10.6.  Small acetone       Results            I personally reviewd CT images of abdomen and pelvis and independent interpretation shows no obvious small bowel obstruction.  I also viewed formal radiology report as read by radiology with findings below:    CT ABDOMEN+PELVIS(CONTRAST ONLY)(CPT=74177)  Result Date: 4/28/2025  PROCEDURE:  CT ABDOMEN+PELVIS (CONTRAST ONLY) (CPT=74177)  COMPARISON:  [facility] , CT, CT ABDOMEN+PELVIS(CONTRAST ONLY)(CPT=74177), 4/08/2025, 9:50 PM.  [facility] , CT, CT ABDOMEN+PELVIS(CONTRAST ONLY)(CPT=74177), 3/26/2025, 4:06 AM.  [facility] , CT, CT ABDOMEN+PELVIS(CONTRAST ONLY)(CPT=74177), 4/23/2025, 1:29 AM.  INDICATIONS:  Increasing rectal pain.  History rectal fistula.  blood sugars over 600, chrons flare also  TECHNIQUE:  CT scanning was performed from the dome of the diaphragm to the pubic symphysis  with non-ionic intravenous contrast material. Post contrast coronal MPR imaging was performed.  Dose reduction techniques were used. Dose information is transmitted to the ACR (American College of Radiology) NRDR (National Radiology Data Registry) which includes the Dose Index Registry.  PATIENT STATED HISTORY:(As transcribed by Technologist)  Pt arrives with c/o Chron's flare up, increased rectal pain. Post op 2 weeks of rectal fistula.   CONTRAST USED:  80cc of Isovue 370  FINDINGS:    LIVER:  Stable probable tiny cyst in the liver..  BILIARY:  Unremarkable.  PANCREAS:  Unremarkable.  SPLEEN:  Unremarkable.  KIDNEYS:  Redemonstration cyst upper pole bilateral kidney.  A low-attenuation bilobed structure stable in the left mid kidney about 2.2 cm.  This may reflect a proteinaceous cyst , and suggest non emergency outpatient ultrasound for confirmation of cyst  and exclude solid nodule.  No hydronephrosis.  ADRENALS:  Unremarkable.  AORTA/VASCULAR:  No aortic aneurysm.  RETROPERITONEUM:  Unremarkable.  BOWEL/MESENTERY/PELVIC ORGANS:  No definite perirectal abscess identified.  Axial image 96 small amount of air present about 9 mm in size just left of the rectum, in the region of the lower uterine segment/upper vagina, note that the cervix is left of midline as well.  IUD present within the uterus stable position.  Large amount of stool throughout the colon increasing since the prior, including increased density especially distal colon consistent with desiccation.  Normal appendix.  No sign of small bowel obstruction ascites or free air.  Large amount of retained food material in the stomach.  No adnexal mass.  No gas in the uterus.  ABDOMINAL WALL:  Unremarkable.  URINARY BLADDER:  Unremarkable.  LYMPH NODES PELVIS:  Unremarkable.   LUNG BASES:  No acute process.  BONES:  No acute abnormality.            CONCLUSION:   No perirectal abscess identified; there is a small gas structure toward the left of the rectum, 9  mm.  This is in the region just below the lower uterine segment and may be small amount of air within the vagina.  Whether this air is physiologic or may reflect a fistula from the rectum is very difficult to determine on this CT.  Given the previous surgery, advise surgery referral for follow-up.  A distinct abscess is not seen.  No bowel obstruction ascites or free air.  Increasing desiccated stool likely reflecting constipation.  Other stable findings as above.    LOCATION:  [facility]   Dictated by (CST): Nico Kaur MD on 4/27/2025 at 11:56 PM     Finalized by (CST): Nico Kaur MD on 4/28/2025 at 0:05 AM       CT ABDOMEN+PELVIS(CONTRAST ONLY)(CPT=74177)  Result Date: 4/23/2025  PROCEDURE:  CT ABDOMEN+PELVIS (CONTRAST ONLY) (CPT=74177)  COMPARISON:  [facility] , CT, CT ABDOMEN+PELVIS(CONTRAST ONLY)(CPT=74177), 4/08/2025, 9:50 PM.  INDICATIONS:  type 1 diabetic, -600s, recent surg fistulectomy in rectum 4/10/25 here, drainage from incision site,  TECHNIQUE:  CT scanning was performed from the dome of the diaphragm to the pubic symphysis with non-ionic intravenous contrast material. Post contrast coronal MPR imaging was performed.  Dose reduction techniques were used. Dose information is transmitted to the ACR (American College of Radiology) NRDR (National Radiology Data Registry) which includes the Dose Index Registry.  PATIENT STATED HISTORY:(As transcribed by Technologist)  Recent s fistulectomy in rectum 4/10/25 here, drainage from incision site. Pain in rectum.   CONTRAST USED:  80cc of Isovue 370  FINDINGS:  LIVER:  No enlargement, atrophy, abnormal density, or significant focal lesion.  BILIARY:  No visible dilatation or calcification.  PANCREAS:  No lesion, fluid collection, ductal dilatation, or atrophy.  SPLEEN:  No enlargement or focal lesion.  KIDNEYS:  No suspicious mass, obstruction, or calcification.  Incidental left renal cysts are noted within index cyst in the upper pole of the left kidney  measuring up to 1.2 cm. ADRENALS:  No mass or enlargement.  AORTA/VASCULAR:  No aneurysm or dissection.  RETROPERITONEUM:  No mass or adenopathy.  BOWEL/MESENTERY:  Mild stranding in the pelvic mesenteric fat may represent mild enteritis..  Appendix is normal. ABDOMINAL WALL:  No mass or hernia.  URINARY BLADDER:  No visible focal wall thickening, lesion, or calculus.  PELVIC NODES:  No adenopathy.  PELVIC ORGANS:  No visible mass.  Pelvic organs appropriate for patient age.  IUD is in place. BONES:  No bony lesion or fracture.  LUNG BASES:  No visible pulmonary or pleural disease.  OTHER:  Negative.             CONCLUSION:  1. Mild stranding in the pelvis may represent enteritis. 2. No abscess or fluid collection is identified.   LOCATION:  [facility]   Dictated by (CST): Mark Skinner MD on 4/23/2025 at 7:36 AM     Finalized by (CST): Mark Skinner MD on 4/23/2025 at 7:42 AM       CT ABDOMEN+PELVIS(CONTRAST ONLY)(CPT=74177)  Result Date: 4/8/2025  PROCEDURE:  CT ABDOMEN+PELVIS (CONTRAST ONLY) (CPT=74177)  COMPARISON:  [facility] , CT, CT ABDOMEN+PELVIS(CONTRAST ONLY)(CPT=74177), 3/26/2025, 4:06 AM.  INDICATIONS:  LLQ and perirectal pain/drainage status post seton placement 4/3  TECHNIQUE:  CT scanning was performed from the dome of the diaphragm to the pubic symphysis with non-ionic intravenous contrast material. Post contrast coronal MPR imaging was performed.  Dose reduction techniques were used. Dose information is transmitted to the ACR (American College of Radiology) NRDR (National Radiology Data Registry) which includes the Dose Index Registry.  PATIENT STATED HISTORY:(As transcribed by Technologist)  left lower and hiwot rectal pain.  drainage status post seton placement 4/3.    CONTRAST USED:  75cc of Isovue 370  FINDINGS: LUNG BASE:  Minimal atelectasis/scarring LIVER:  Unremarkable. BILIARY:  Unremarkable. SPLEEN:  Unremarkable. PANCREAS:  Unremarkable. ADRENALS:  Unremarkable. KIDNEYS:  Subcentimeter  hypodensities in the kidneys measuring up to 9 mm are too small to further characterize and warrant no specific follow-up.  Hypodense lesions with higher than fluid attenuation in the left kidney measuring up to 2.1 cm in the mid left kidney may represent a proteinaceous cysts, with solid renal mass not entirely excluded.  This could be further assessed with a dedicated renal ultrasound as clinically directed.  Mild cortical scarring in the upper pole right kidney. AORTA/VASCULAR:  Unremarkable. RETROPERITONEUM:  Unremarkable. BOWEL/MESENTERY:  Unremarkable appendix.  Moderate feces in the colon.  No large or small bowel dilatation.  No free air or significant free fluid.  There is a possible stent or suture noted in the region of the anal rectal junction.  No drainable fluid collection is identified in this area.  Clinical correlation and direct inspection is suggested.  ABDOMINAL WALL:  Navel piercing noted. PELVIC ORGANS:  Unremarkable urinary bladder.  IUD in the uterus.  Unremarkable ovaries. LYMPH NODES:  No lymphadenopathy in the abdomen or pelvis. BONES:  Unremarkable. OTHER:  None.            CONCLUSION:   1. There is a possible stent or suture noted in the region of the anal rectal junction.  No drainable fluid collection is identified in this area.  Clinical correlation and direct inspection is suggested.  2. Hypodense lesions with higher than fluid attenuation in the left kidney measuring up to 2.1 cm in the mid left kidney may represent a proteinaceous cysts, with solid renal mass not entirely excluded.  This could be further assessed with a dedicated renal ultrasound as clinically directed.  Please see above for further details.  LOCATION:  [facility]   Dictated by (CST): Chan Delatorre MD on 4/08/2025 at 10:14 PM     Finalized by (CST): Chan Delatorre MD on 4/08/2025 at 10:18 PM         Medications   escitalopram (Lexapro) tab 20 mg (has no administration in time range)   glucose (Dex4) 15 GM/59ML oral  liquid 15 g (has no administration in time range)     Or   glucose (Glutose) 40% oral gel 15 g (has no administration in time range)     Or   glucose-vitamin C (Dex-4) chewable tab 4 tablet (has no administration in time range)     Or   dextrose 50% injection 50 mL (has no administration in time range)     Or   glucose (Dex4) 15 GM/59ML oral liquid 30 g (has no administration in time range)     Or   glucose (Glutose) 40% oral gel 30 g (has no administration in time range)     Or   glucose-vitamin C (Dex-4) chewable tab 8 tablet (has no administration in time range)   melatonin tab 3 mg (has no administration in time range)   sodium chloride 0.9% infusion ( Intravenous New Bag 4/28/25 0354)   acetaminophen (Tylenol Extra Strength) tab 500 mg (has no administration in time range)   ondansetron (Zofran) 4 MG/2ML injection 4 mg (has no administration in time range)   prochlorperazine (Compazine) 10 MG/2ML injection 5 mg (has no administration in time range)   insulin degludec (Tresiba) 100 units/mL flextouch 15 Units (has no administration in time range)   insulin aspart (NovoLOG) 100 Units/mL FlexPen 1-10 Units (has no administration in time range)   HYDROmorphone (Dilaudid) 1 MG/ML injection 0.5 mg (has no administration in time range)     Or   HYDROmorphone (Dilaudid) 1 MG/ML injection 1 mg (has no administration in time range)     Or   HYDROmorphone (Dilaudid) 1 MG/ML injection 2 mg (has no administration in time range)   insulin aspart (NovoLOG) 100 Units/mL vial 13 Units (13 Units Subcutaneous Given 4/27/25 2340)   sodium chloride 0.9 % IV bolus 1,000 mL (0 mL Intravenous Stopped 4/28/25 0037)   ondansetron (Zofran) 4 MG/2ML injection 4 mg (4 mg Intravenous Given 4/27/25 2336)   iopamidol 76% (ISOVUE-370) injection for power injector (80 mL Intravenous Given 4/27/25 2348)   HYDROmorphone (Dilaudid) 1 MG/ML injection 1 mg (1 mg Intravenous Given 4/28/25 0101)                          MDM      Patient 40-year-old  female presents to ED for evaluation of hyperglycemia, abdominal pain, bloody stool.  Differential Crohn's exacerbation, DKA, bowel obstruction.  Patient went laboratory testing showing elevated blood sugar of 661.  She was given IV fluids.  Subcutaneous insulin given.  Blood sugar repeated was 499.  No evidence for ketoacidosis.  Patient had CT abdomen pelvis performed given history of Crohn's disease showing small gas structure toward the left of the rectum 9 mm just below the uterine segment may be a small amount of air within the vagina.  Patient will be admitted to the hospital for hyperglycemia, possible Crohn's exacerbation.  May need steroids and GI consultation.  Case discussed with hospitalist, Dr. Schaefer.  Workup and results were discussed with patient. Patient has no other questions, complaints or concerns. Patient will be admitted to the hospital for further workup.    Admission disposition: 4/28/2025  1:25 AM           Medical Decision Making      Disposition and Plan     Clinical Impression:  1. Hyperglycemia    2. Crohn's disease without complication, unspecified gastrointestinal tract location (HCC)         Disposition:  Admit  4/28/2025  1:25 am    Follow-up:  No follow-up provider specified.        Medications Prescribed:  Current Discharge Medication List          Supplementary Documentation:         Hospital Problems       Present on Admission  Date Reviewed: 4/22/2025          ICD-10-CM Noted POA    * (Principal) Hyperglycemia R73.9 3/26/2025 Unknown    Crohn's disease without complication, unspecified gastrointestinal tract location (HCC) K50.90 3/26/2025 Unknown

## 2025-04-28 NOTE — PLAN OF CARE
NURSING ADMISSION NOTE      Patient admitted to unit from ED at approximately 0230  Oriented to room.  Safety precautions initiated.  Bed in low position.  Call light in reach.    AxOx4  RA  VSS  NSR on tele  PRN Pain medication given for LLQ & rectal pain per MAR  NPO except sips w/meds  RAC PIV w/0.9NS infusing at 100ml/hr  GI to see  Up independent  Denies nausea  Admission navigator completed with pt by this RN.  Needs met at this time    Problem: GASTROINTESTINAL - ADULT  Goal: Minimal or absence of nausea and vomiting  Description: INTERVENTIONS:- Maintain adequate hydration with IV or PO as ordered and tolerated- Nasogastric tube to low intermittent suction as ordered- Evaluate effectiveness of ordered antiemetic medications- Provide nonpharmacologic comfort measures as appropriate- Advance diet as tolerated, if ordered- Obtain nutritional consult as needed- Evaluate fluid balance  Outcome: Progressing  Goal: Maintains or returns to baseline bowel function  Description: INTERVENTIONS:- Assess bowel function- Maintain adequate hydration with IV or PO as ordered and tolerated- Evaluate effectiveness of GI medications- Encourage mobilization and activity- Obtain nutritional consult as needed- Establish a toileting routine/schedule- Consider collaborating with pharmacy to review patient's medication profile  Outcome: Progressing     Problem: PAIN - ADULT  Goal: Verbalizes/displays adequate comfort level or patient's stated pain goal  Description: INTERVENTIONS:- Encourage pt to monitor pain and request assistance- Assess pain using appropriate pain scale- Administer analgesics based on type and severity of pain and evaluate response- Implement non-pharmacological measures as appropriate and evaluate response- Consider cultural and social influences on pain and pain management- Manage/alleviate anxiety- Utilize distraction and/or relaxation techniques- Monitor for opioid side effects- Notify MD/LIP if  interventions unsuccessful or patient reports new pain- Anticipate increased pain with activity and pre-medicate as appropriate  Outcome: Progressing

## 2025-04-28 NOTE — ED QUICK NOTES
Orders for admission, patient is aware of plan and ready to go upstairs. Any questions, please call ED RN Kandy at extension [phone].     Patient Covid vaccination status: Unvaccinated     COVID Test Ordered in ED: None    COVID Suspicion at Admission: N/A    Running Infusions: Medication Infusions[1] None    Mental Status/LOC at time of transport: a/ox4    Other pertinent information:   CIWA score: N/A   NIH score:  N/A             [1]

## 2025-04-28 NOTE — ED INITIAL ASSESSMENT (HPI)
Pt arrives with c/o Chron's flare up, increased rectal pain. Post op 2 weeks of rectal fistula. Pt is T1D and unable to manage eating or fluids. NVD since yesterday. BS over 600.

## 2025-04-29 VITALS
WEIGHT: 140.81 LBS | TEMPERATURE: 98 F | RESPIRATION RATE: 17 BRPM | SYSTOLIC BLOOD PRESSURE: 100 MMHG | OXYGEN SATURATION: 94 % | BODY MASS INDEX: 23 KG/M2 | HEART RATE: 66 BPM | DIASTOLIC BLOOD PRESSURE: 55 MMHG

## 2025-04-29 LAB
ANION GAP SERPL CALC-SCNC: 5 MMOL/L (ref 0–18)
BASOPHILS # BLD AUTO: 0.03 X10(3) UL (ref 0–0.2)
BASOPHILS NFR BLD AUTO: 0.6 %
BUN BLD-MCNC: <5 MG/DL (ref 9–23)
CALCIUM BLD-MCNC: 8.7 MG/DL (ref 8.7–10.6)
CHLORIDE SERPL-SCNC: 109 MMOL/L (ref 98–112)
CO2 SERPL-SCNC: 24 MMOL/L (ref 21–32)
CREAT BLD-MCNC: 0.53 MG/DL (ref 0.55–1.02)
EGFRCR SERPLBLD CKD-EPI 2021: 120 ML/MIN/1.73M2 (ref 60–?)
EOSINOPHIL # BLD AUTO: 0.12 X10(3) UL (ref 0–0.7)
EOSINOPHIL NFR BLD AUTO: 2.5 %
ERYTHROCYTE [DISTWIDTH] IN BLOOD BY AUTOMATED COUNT: 15.6 %
EST. AVERAGE GLUCOSE BLD GHB EST-MCNC: 226 MG/DL (ref 68–126)
GLUCOSE BLD-MCNC: 185 MG/DL (ref 70–99)
GLUCOSE BLD-MCNC: 255 MG/DL (ref 70–99)
HBA1C MFR BLD: 9.5 % (ref ?–5.7)
HCT VFR BLD AUTO: 30 % (ref 35–48)
HGB BLD-MCNC: 10 G/DL (ref 12–16)
IMM GRANULOCYTES # BLD AUTO: 0.01 X10(3) UL (ref 0–1)
IMM GRANULOCYTES NFR BLD: 0.2 %
LYMPHOCYTES # BLD AUTO: 1.69 X10(3) UL (ref 1–4)
LYMPHOCYTES NFR BLD AUTO: 35.4 %
MCH RBC QN AUTO: 31 PG (ref 26–34)
MCHC RBC AUTO-ENTMCNC: 33.3 G/DL (ref 31–37)
MCV RBC AUTO: 92.9 FL (ref 80–100)
MONOCYTES # BLD AUTO: 0.39 X10(3) UL (ref 0.1–1)
MONOCYTES NFR BLD AUTO: 8.2 %
NEUTROPHILS # BLD AUTO: 2.53 X10 (3) UL (ref 1.5–7.7)
NEUTROPHILS # BLD AUTO: 2.53 X10(3) UL (ref 1.5–7.7)
NEUTROPHILS NFR BLD AUTO: 53.1 %
PLATELET # BLD AUTO: 332 10(3)UL (ref 150–450)
POTASSIUM SERPL-SCNC: 3.9 MMOL/L (ref 3.5–5.1)
RBC # BLD AUTO: 3.23 X10(6)UL (ref 3.8–5.3)
SODIUM SERPL-SCNC: 138 MMOL/L (ref 136–145)
WBC # BLD AUTO: 4.8 X10(3) UL (ref 4–11)

## 2025-04-29 PROCEDURE — 99221 1ST HOSP IP/OBS SF/LOW 40: CPT | Performed by: CLINICAL NURSE SPECIALIST

## 2025-04-29 PROCEDURE — 99239 HOSP IP/OBS DSCHRG MGMT >30: CPT | Performed by: HOSPITALIST

## 2025-04-29 RX ORDER — INSULIN DEGLUDEC 100 U/ML
25 INJECTION, SOLUTION SUBCUTANEOUS DAILY
Status: DISCONTINUED | OUTPATIENT
Start: 2025-04-29 | End: 2025-04-29

## 2025-04-29 RX ORDER — INSULIN DEGLUDEC 100 U/ML
20 INJECTION, SOLUTION SUBCUTANEOUS DAILY
Status: DISCONTINUED | OUTPATIENT
Start: 2025-04-29 | End: 2025-04-29

## 2025-04-29 NOTE — CONSULTS
[facility]  Diabetes Consult Note    Esperanza Mauro Patient Status:  Observation    1985 MRN [id number]   Location [facility] 3NE-A Attending Serge Liu,    Hosp Day # 0 PCP None Pcp       Reason for Consult:   Management recommendations in setting of uncontrolled T1DM      Provider Requesting Consult:  Dr. Liu (Mobile hospitalist)      Diagnosis:  Problem List[1]      Medical History:  Past Medical History[2]  Past Surgical History[3]  Family History[4]      Diabetes history:  Type:  T1DM  Onset: age 31  Family history of DM: none      Allergies: Allergies[5]      Medications: Complete list reviewed. Active inpatient diabetes medications include degludec and aspart.  Diabetic Medications              HUMALOG KWIKPEN 100 UNIT/ML Subcutaneous Solution Pen-injector (Taking Differently) Inject 8-14 Units into the skin 3 (three) times daily with meals. Plus sliding scale. Carb ratio 1-6 grams. Correction 1-30. Total 50 units daily.     Patient taking differently: Inject 3-6 Units into the skin in the morning and 3-6 Units at noon and 3-6 Units in the evening. Inject with meals. Per sliding scale .    TRESIBA FLEXTOUCH 100 UNIT/ML Subcutaneous Solution Pen-injector Inject 40 Units into the skin daily. Prime 2 units before each dose     Patient taking differently: Inject 15 Units into the skin in the morning and 15 Units before bedtime. 15 units in the morning and 15 units nightly .              Vital Signs:  Blood pressure 100/55, pulse 66, temperature 98.4 °F (36.9 °C), temperature source Oral, resp. rate 17, weight 140 lb 12.8 oz (63.9 kg), last menstrual period 10/01/2024, SpO2 94%.       Review of Systems:  General: No acute distress. Alert and oriented x 3. Resting comfortably in bed  HEENT: Moist mucous membranes  Respiratory: breathing comfortably  Cardiovascular:  Regular rate   Psychiatric: Appropriate mood and affect.      Labs:  Recent Labs   Lab 25  1212 25  3714  04/28/25  1657 04/28/25  2042 04/29/25  0532   PGLU 299* 273* 174* 235* 255*     Recent Labs     04/27/25  2310 04/28/25  0052 04/29/25  0532 04/29/25  0742   *  --   --  138     --   --  109   CO2 23.0  --   --  24.0   BUN 12  --   --  <5*   CREATSERUM 0.91  --   --  0.53*   A1C 9.5*  --   --   --    PGLU  --    < > 255*  --    CA 9.7  --   --  8.7   ALB 4.2  --   --   --     < > = values in this interval not displayed.                    History of Present Illness: Esperanza Mauro is a 40 year old female with a PMH of T1DM, multiple anal fistula repairs/resections (last 4/11/25), Chron's disease, and hidradenitis suppurativa admitted 4/27/2025 from home with increased rectal pain, nausea/vomiting and hyperglycemia (400-600s on home glucometer). ED evaluation revealed continued hyperglycemia - , A1C 9.5%.         Assessment/Recommendations:  Upon interview today, patient states she was diagnosed with T1DM at age 31; states she follows with endocrinology at [facility] for management. Upon review today, patient states 100% compliance with prescribed doses of home basal and bolus insulin. Patient states multiple hospitalizations and steroid doses have led to uncontrolled glucose over the past month or two. Today, recommended restart of inpatient basal and bolus insulin similar to home doses given patient has been upgraded to soft diet. Per patient, plans in place for OmniPod insulin pump initiation with [facility] team; therefore, recommended patient continue to follow with them for outpatient management. Patient states understanding of and willingness to participate in plan of care.       Plan:  Inpatient recommendations -   Degludec = given elevated fasted AM glucose, recommend increasing to 20u every day   When eating full meals, patient's home dose is 30u every day   Aspart = given elevated AC/HS glucose values yesterday, recommend initiating low dose sliding scale & 1:10gm  CHO  Similar to patient's home meal-time insulin doses  Discharge recommendations -   Restart home basal/bolus insulin regimen:  Tresiba = 30u every day   Humalog = low dose sliding scale, 1:50>150 & 1:10gm CHO      Prescription Recommendations:  Commercial -  [employer] PPO  Insulin:   Novolog, Fiasp, Apidra, Admelog, Levemir, Lantus, Basaglar, Tresiba, Toujeo   Supplies:  BD pen needles (Tereza); BD syringes  Glucometer:  OneTouch   CGM:  Dexcom G6&7; FreeStyle Baudilio 3  Insulin Pump:  OmniPod 5       Provider follow up recommendations:  PCP - patient needs to establish care   Endocrinology/Diabetes Center - [facility] endocrinology      A total of 60 minutes were spent with the patient, 100% was spent counseling and coordinating care for T1 diabetes self-management including nutrition, exercise, blood glucose monitoring, insulin administration, medications, treatment options, follow-up coordination and resources.       Antonietta Peterson, APRN  4/29/2025  10:36 AM       [1]   Patient Active Problem List  Diagnosis    Hyponatremia    Hyperglycemia    Anemia    Crohn's disease without complication, unspecified gastrointestinal tract location (HCC)    Type 1 diabetes mellitus with hyperglycemia (HCC)    Uncontrolled type 1 diabetes mellitus with hypoglycemia without coma (HCC)    Rectal pain    Exacerbation of Crohn's disease of large intestine (HCC)    Rectal fistula, complex, recurrent    Anal fistula    Persistent depressive disorder    Anal or rectal pain    Gastrointestinal hemorrhage associated with anorectal source   [2]   Past Medical History:   Crohn's disease (HCC)    Hidradenitis suppurativa    Type 1 diabetes mellitus (HCC)   [3]   Past Surgical History:  Procedure Laterality Date    I&d deep absc neck/chst+rib cut      Other surgical history  04/02/2025    [facility]. rectal fistula repair    Repair of anal fistula w/fibrin glue     [4] History reviewed. No pertinent family history.  [5]    Allergies  Allergen Reactions    Vancomycin ANAPHYLAXIS    Morphine RASH    Toradol [Ketorolac Tromethamine] RASH

## 2025-04-29 NOTE — PLAN OF CARE
Assumed patient care at 1930.  AxOx4  RA  VSS  NSR on tele  PRN pain medication given per MAR for LLQ and rectal pain  Denies nausea  Up independent  Received patient on full liquid diet, pt tolerated diet well. Advanced diet to low fiber/soft for breakfast, per order.  QID accucheks  RAC PIV w/0.9 NS infusing at 100ml/hr  Bed in lowest position  Call light within reach  Needs met at this time    Problem: GASTROINTESTINAL - ADULT  Goal: Minimal or absence of nausea and vomiting  Description: INTERVENTIONS:- Maintain adequate hydration with IV or PO as ordered and tolerated- Nasogastric tube to low intermittent suction as ordered- Evaluate effectiveness of ordered antiemetic medications- Provide nonpharmacologic comfort measures as appropriate- Advance diet as tolerated, if ordered- Obtain nutritional consult as needed- Evaluate fluid balance  Outcome: Progressing  Goal: Maintains or returns to baseline bowel function  Description: INTERVENTIONS:- Assess bowel function- Maintain adequate hydration with IV or PO as ordered and tolerated- Evaluate effectiveness of GI medications- Encourage mobilization and activity- Obtain nutritional consult as needed- Establish a toileting routine/schedule- Consider collaborating with pharmacy to review patient's medication profile  Outcome: Progressing     Problem: PAIN - ADULT  Goal: Verbalizes/displays adequate comfort level or patient's stated pain goal  Description: INTERVENTIONS:- Encourage pt to monitor pain and request assistance- Assess pain using appropriate pain scale- Administer analgesics based on type and severity of pain and evaluate response- Implement non-pharmacological measures as appropriate and evaluate response- Consider cultural and social influences on pain and pain management- Manage/alleviate anxiety- Utilize distraction and/or relaxation techniques- Monitor for opioid side effects- Notify MD/LIP if interventions unsuccessful or patient reports new pain-  Anticipate increased pain with activity and pre-medicate as appropriate  Outcome: Progressing

## 2025-04-29 NOTE — PLAN OF CARE
Pt AOX4, NSR, room air, VSS  PRN Dilaudid for pain  Patient ordered soft low fiber breakfast, tolerated without issue  Insulin per MAR    Problem: GASTROINTESTINAL - ADULT  Goal: Minimal or absence of nausea and vomiting  Description: INTERVENTIONS:- Maintain adequate hydration with IV or PO as ordered and tolerated- Nasogastric tube to low intermittent suction as ordered- Evaluate effectiveness of ordered antiemetic medications- Provide nonpharmacologic comfort measures as appropriate- Advance diet as tolerated, if ordered- Obtain nutritional consult as needed- Evaluate fluid balance  Outcome: Progressing  Goal: Maintains or returns to baseline bowel function  Description: INTERVENTIONS:- Assess bowel function- Maintain adequate hydration with IV or PO as ordered and tolerated- Evaluate effectiveness of GI medications- Encourage mobilization and activity- Obtain nutritional consult as needed- Establish a toileting routine/schedule- Consider collaborating with pharmacy to review patient's medication profile  Outcome: Progressing

## 2025-04-29 NOTE — DISCHARGE INSTRUCTIONS
Discharge recommendations -   Restart home basal/bolus insulin regimen:  Tresiba = 30u every day   Humalog = low dose sliding scale, 1:50>150 & 1:10gm CHO    Follow up with [facility] Endocrinology    Follow up with a primary care physician

## 2025-04-29 NOTE — DISCHARGE SUMMARY
[facility]ITAL[facility]  DISCHARGE SUMMARY     Esperanza Mauro Patient Status:  Observation    1985 MRN [id number]   Location [facility] 3NE-A Attending No att. providers found   Hosp Day # 0 PCP None Pcp     Date of Admission: 2025  Date of Discharge:  2025     Discharge Disposition: Left Against Medical Advice    Discharge Diagnosis:  Acute on chronic abdominal/rectal pain  Hematochezia   Crohn's disease  Type 1 diabetes   Depression  Narcotic dependence     History of Present Illness: Esperanza Mauro is a 40 year old female with history of Crohn's, type 1 diabetes, fistula status post fistulotomy for , depression presents emergency room with diarrhea with bright red blood.  Patient for episode yesterday and is also had 4 episodes of bowel.  Patient is on Remicade no fevers, chills.  No chest pain, shortness of breath, palpitations.  No dizziness, lightheadedness, or syncope     Brief Synopsis: Patient admitted with acute on chronic abdominal/rectal pain and hematochezia in setting of Crohn's disease. GI consulted. Imaging not suggestive of Crohn's flare. Stool PCR negative. Diet advanced as tolerated. GI advised follow-up with physicians at [facility]. Patient seen by diabetes APRN and adjustments made to insulin. Concern for narcotic seeking behavior - patient decided to leave AMA once IV dilaudid discontinued.    Lace+ Score: 56  59-90 High Risk  29-58 Medium Risk  0-28   Low Risk  Patient was referred to [facility].    TCM Follow-Up Recommendation:  LACE 29-58: Moderate Risk of readmission after discharge from the hospital.      Procedures during hospitalization:   None    Incidental or significant findings and recommendations (brief descriptions):  None    Lab/Test results pending at Discharge:   None    Consultants:  GI    Discharge Medication List:     Discharge Medications        CHANGE how you take these medications        Instructions Prescription  details   Amphetamine-Dextroamphet ER 20 MG Cp24  Commonly known as: ADDERALL XR  What changed:   when to take this  reasons to take this      Take 1 capsule (20 mg total) by mouth every morning.   Refills: 0     amphetamine-dextroamphetamine 10 MG Tabs  Commonly known as: Adderall  What changed:   when to take this  reasons to take this      Take 1 tablet (10 mg total) by mouth daily.   Refills: 0     HumaLOG KwikPen 100 UNIT/ML Sopn  Generic drug: Insulin Lispro (1 Unit Dial)  What changed: See the new instructions.      Inject 8-14 Units into the skin 3 (three) times daily with meals. Plus sliding scale. Carb ratio 1-6 grams. Correction 1-30. Total 50 units daily.   Refills: 0            CONTINUE taking these medications        Instructions Prescription details   dibucaine 1 % Oint  Commonly known as: Nupercainal      Apply 1 Application topically every 8 (eight) hours as needed.   Quantity: 56 g  Refills: 1     escitalopram 20 MG Tabs  Commonly known as: Lexapro      Take 1 tablet (20 mg total) by mouth in the morning.   Refills: 0     HYDROcodone-acetaminophen  MG Tabs  Commonly known as: Norco      Take 1 tablet by mouth every 6 (six) hours as needed for Pain.   Refills: 0     hydrocortisone 25 MG Supp  Commonly known as: Anusol-HC      Place 1 suppository (25 mg total) rectally daily.   Quantity: 14 suppository  Refills: 2     HYDROmorphone 8 MG Tabs  Commonly known as: Dilaudid      Take 1 tablet (8 mg total) by mouth every 4 (four) hours as needed for Pain.   Refills: 0     Naloxone HCl 4 MG/0.1ML Liqd      4 mg by Nasal route as needed. If patient remains unresponsive, repeat dose in other nostril 2-5 minutes after first dose.   Quantity: 1 kit  Refills: 0     REMICADE IV       Refills: 0     Tresiba FlexTouch 100 UNIT/ML Sopn  Generic drug: insulin degludec      Inject 40 Units into the skin daily. Prime 2 units before each dose   Refills: 0            ASK your doctor about these medications         Instructions Prescription details   polyethylene glycol (PEG 3350) 17 g Pack  Commonly known as: Miralax      Take 17 g by mouth daily.   Quantity: 510 g  Refills: 2              [place]    Follow-up appointment:   Pcp, None  [place] [address]    Follow up in 1 week(s)  call our Physician Referral line at [phone], Monday through Friday from 7 a.m. to 6 p.m. and  from 7 a.m. to 1 p.m    Marie Amaro    Schedule an appointment as soon as possible for a visit today      Xavi Sandhu MD  [address] [place] [address] [phone]    Follow up  If symptoms worsen    Appointments for Next 30 Days 2025 - 2025        Date Arrival Time Visit Type Length Department Provider     2025  1:40 PM  FOLLOW-UP OV [39268] 20 min [facility] Gastroenterology,  [facility],     Patient Instructions:     Please arrive 15 minutes prior to your scheduled appointment time.                    5/15/2025  8:45 AM  POST OP VISIT [[id number]] 15 min [facility] Medical [facility], [facility],Eamon Gunn MD    Patient Instructions:         Location Instructions:     Masks are optional for all patients and visitors, unless otherwise indicated. Note: A $50 fee will be charged for missed appointments or same-day cancellations. Please provide 24 hours' notice if you need to cancel or reschedule your appointment.                      Vital signs:  Temp:  [98.3 °F (36.8 °C)-98.5 °F (36.9 °C)] 98.4 °F (36.9 °C)  Pulse:  [66-81] 66  Resp:  [17-18] 17  BP: ()/(55-76) 100/55  SpO2:  [94 %-97 %] 94 %    Physical Exam:    General: No acute distress   Lungs: clear to auscultation  Cardiovascular: S1, S2  Abdomen: Soft    -----------------------------------------------------------------------------------------------  PATIENT DISCHARGE INSTRUCTIONS: See electronic chart    Serge Liu DO    Total time spent on discharge plannin minutes     The  Century Cures Act makes  medical notes like these available to patients in the interest of transparency. Please be advised this is a medical document. Medical documents are intended to carry relevant information, facts as evident, and the clinical opinion of the practitioner. The medical note is intended as peer to peer communication and may appear blunt or direct. It is written in medical language and may contain abbreviations or verbiage that are unfamiliar.

## 2025-04-29 NOTE — PROGRESS NOTES
Risks/benefits/alternatives discussed with patient as applicable to reason for leaving AMA? Yes  Provider(s) contacted: Dr. Liu  Patient education/AVS/follow-up appointments/prescriptions given? yes  AMA paperwork completed? yes  Removed IV access/decannulated port if applicable and patient belongings returned.    Documented from Admission Navigator:  Belongings at Bedside: None  Belongings Sent to Public Safety: None  Medications brought by patient?: No

## 2025-04-30 ENCOUNTER — PATIENT OUTREACH (OUTPATIENT)
Dept: CASE MANAGEMENT | Age: 40
End: 2025-04-30

## 2025-04-30 ENCOUNTER — APPOINTMENT (OUTPATIENT)
Dept: CT IMAGING | Facility: HOSPITAL | Age: 40
End: 2025-04-30
Attending: STUDENT IN AN ORGANIZED HEALTH CARE EDUCATION/TRAINING PROGRAM
Payer: COMMERCIAL

## 2025-04-30 ENCOUNTER — HOSPITAL ENCOUNTER (EMERGENCY)
Facility: HOSPITAL | Age: 40
Discharge: HOME | End: 2025-05-01
Attending: STUDENT IN AN ORGANIZED HEALTH CARE EDUCATION/TRAINING PROGRAM
Payer: COMMERCIAL

## 2025-04-30 DIAGNOSIS — R10.9 ABDOMINAL PAIN, ACUTE: Primary | ICD-10-CM

## 2025-04-30 DIAGNOSIS — E10.65 TYPE 1 DIABETES MELLITUS WITH HYPERGLYCEMIA (HCC): ICD-10-CM

## 2025-04-30 LAB
ALBUMIN SERPL-MCNC: 4.4 G/DL (ref 3.2–4.8)
ALBUMIN/GLOB SERPL: 1.9 {RATIO} (ref 1–2)
ALP LIVER SERPL-CCNC: 97 U/L (ref 37–98)
ALT SERPL-CCNC: 9 U/L (ref 10–49)
ANION GAP SERPL CALC-SCNC: 9 MMOL/L (ref 0–18)
AST SERPL-CCNC: 17 U/L (ref ?–34)
BASE EXCESS BLDV CALC-SCNC: -4.4 MMOL/L
BASOPHILS # BLD AUTO: 0.01 X10(3) UL (ref 0–0.2)
BASOPHILS NFR BLD AUTO: 0.2 %
BILIRUB SERPL-MCNC: <0.2 MG/DL (ref 0.3–1.2)
BILIRUB UR QL STRIP.AUTO: NEGATIVE
BUN BLD-MCNC: 8 MG/DL (ref 9–23)
CALCIUM BLD-MCNC: 9.2 MG/DL (ref 8.7–10.6)
CHLORIDE SERPL-SCNC: 101 MMOL/L (ref 98–112)
CLARITY UR REFRACT.AUTO: CLEAR
CO2 SERPL-SCNC: 22 MMOL/L (ref 21–32)
COLOR UR AUTO: COLORLESS
CREAT BLD-MCNC: 0.94 MG/DL (ref 0.55–1.02)
EGFRCR SERPLBLD CKD-EPI 2021: 79 ML/MIN/1.73M2 (ref 60–?)
EOSINOPHIL # BLD AUTO: 0.04 X10(3) UL (ref 0–0.7)
EOSINOPHIL NFR BLD AUTO: 0.9 %
ERYTHROCYTE [DISTWIDTH] IN BLOOD BY AUTOMATED COUNT: 15.4 %
GLOBULIN PLAS-MCNC: 2.3 G/DL (ref 2–3.5)
GLUCOSE BLD-MCNC: 458 MG/DL (ref 70–99)
GLUCOSE BLD-MCNC: 548 MG/DL (ref 70–99)
GLUCOSE BLD-MCNC: 639 MG/DL (ref 70–99)
GLUCOSE UR STRIP.AUTO-MCNC: >1000 MG/DL
HCO3 BLDV-SCNC: 21.3 MEQ/L (ref 22–26)
HCT VFR BLD AUTO: 30.8 % (ref 35–48)
HGB BLD-MCNC: 10.6 G/DL (ref 12–16)
IMM GRANULOCYTES # BLD AUTO: 0.01 X10(3) UL (ref 0–1)
IMM GRANULOCYTES NFR BLD: 0.2 %
KETONES UR STRIP.AUTO-MCNC: NEGATIVE MG/DL
LEUKOCYTE ESTERASE UR QL STRIP.AUTO: NEGATIVE
LYMPHOCYTES # BLD AUTO: 0.95 X10(3) UL (ref 1–4)
LYMPHOCYTES NFR BLD AUTO: 20.6 %
MCH RBC QN AUTO: 31.4 PG (ref 26–34)
MCHC RBC AUTO-ENTMCNC: 34.4 G/DL (ref 31–37)
MCV RBC AUTO: 91.1 FL (ref 80–100)
MONOCYTES # BLD AUTO: 0.36 X10(3) UL (ref 0.1–1)
MONOCYTES NFR BLD AUTO: 7.8 %
NEUTROPHILS # BLD AUTO: 3.24 X10 (3) UL (ref 1.5–7.7)
NEUTROPHILS # BLD AUTO: 3.24 X10(3) UL (ref 1.5–7.7)
NEUTROPHILS NFR BLD AUTO: 70.3 %
NITRITE UR QL STRIP.AUTO: NEGATIVE
OSMOLALITY SERPL CALC.SUM OF ELEC: 302 MOSM/KG (ref 275–295)
OXYHGB MFR BLDV: 88.4 % (ref 72–78)
PCO2 BLDV: 39 MM HG (ref 38–50)
PH BLDV: 7.34 [PH] (ref 7.33–7.43)
PH UR STRIP.AUTO: 6.5 [PH] (ref 5–8)
PLATELET # BLD AUTO: 368 10(3)UL (ref 150–450)
PO2 BLDV: 96 MM HG (ref 30–50)
POTASSIUM SERPL-SCNC: 4.3 MMOL/L (ref 3.5–5.1)
PROT SERPL-MCNC: 6.7 G/DL (ref 5.7–8.2)
PROT UR STRIP.AUTO-MCNC: NEGATIVE MG/DL
RBC # BLD AUTO: 3.38 X10(6)UL (ref 3.8–5.3)
RBC UR QL AUTO: NEGATIVE
SODIUM SERPL-SCNC: 132 MMOL/L (ref 136–145)
SP GR UR STRIP.AUTO: 1.02 (ref 1–1.03)
UROBILINOGEN UR STRIP.AUTO-MCNC: NORMAL MG/DL
WBC # BLD AUTO: 4.6 X10(3) UL (ref 4–11)

## 2025-04-30 PROCEDURE — 81003 URINALYSIS AUTO W/O SCOPE: CPT | Performed by: STUDENT IN AN ORGANIZED HEALTH CARE EDUCATION/TRAINING PROGRAM

## 2025-04-30 PROCEDURE — 96361 HYDRATE IV INFUSION ADD-ON: CPT

## 2025-04-30 PROCEDURE — 80053 COMPREHEN METABOLIC PANEL: CPT | Performed by: STUDENT IN AN ORGANIZED HEALTH CARE EDUCATION/TRAINING PROGRAM

## 2025-04-30 PROCEDURE — 96374 THER/PROPH/DIAG INJ IV PUSH: CPT

## 2025-04-30 PROCEDURE — 82962 GLUCOSE BLOOD TEST: CPT

## 2025-04-30 PROCEDURE — 82803 BLOOD GASES ANY COMBINATION: CPT | Performed by: STUDENT IN AN ORGANIZED HEALTH CARE EDUCATION/TRAINING PROGRAM

## 2025-04-30 PROCEDURE — 99284 EMERGENCY DEPT VISIT MOD MDM: CPT

## 2025-04-30 PROCEDURE — 85025 COMPLETE CBC W/AUTO DIFF WBC: CPT

## 2025-04-30 PROCEDURE — 85025 COMPLETE CBC W/AUTO DIFF WBC: CPT | Performed by: STUDENT IN AN ORGANIZED HEALTH CARE EDUCATION/TRAINING PROGRAM

## 2025-04-30 PROCEDURE — 96376 TX/PRO/DX INJ SAME DRUG ADON: CPT

## 2025-04-30 PROCEDURE — 96375 TX/PRO/DX INJ NEW DRUG ADDON: CPT

## 2025-04-30 PROCEDURE — 80053 COMPREHEN METABOLIC PANEL: CPT

## 2025-04-30 PROCEDURE — 74177 CT ABD & PELVIS W/CONTRAST: CPT | Performed by: STUDENT IN AN ORGANIZED HEALTH CARE EDUCATION/TRAINING PROGRAM

## 2025-04-30 RX ORDER — ONDANSETRON 2 MG/ML
4 INJECTION INTRAMUSCULAR; INTRAVENOUS ONCE
Status: DISCONTINUED | OUTPATIENT
Start: 2025-04-30 | End: 2025-05-01

## 2025-04-30 RX ORDER — HYDROMORPHONE HYDROCHLORIDE 1 MG/ML
1 INJECTION, SOLUTION INTRAMUSCULAR; INTRAVENOUS; SUBCUTANEOUS ONCE
Refills: 0 | Status: COMPLETED | OUTPATIENT
Start: 2025-04-30 | End: 2025-04-30

## 2025-04-30 RX ORDER — INSULIN ASPART 100 [IU]/ML
0.2 INJECTION, SOLUTION INTRAVENOUS; SUBCUTANEOUS ONCE
Status: COMPLETED | OUTPATIENT
Start: 2025-04-30 | End: 2025-05-01

## 2025-04-30 NOTE — DISCHARGE SUMMARY
[facility]     Esperanza Mauro Patient Status:  Observation    1985 MRN [id number]   Location [facility] 2SW-S Attending No att. providers found   Hosp Day # 0 PCP None Pcp     Date of Admission: 2025  Date of Discharge:  2025     Discharge Disposition: Left Against Medical Advice    Discharge Diagnosis:  Acute on chronic abdominal/rectal pain  Hematochezia   Crohn's disease  Type 1 diabetes   Depression  Narcotic dependence     History of Present Illness:   41 yo female with diabetes type 1 chrons disease, chronic anorectal pain which revealed left posterolateral intersphincteric fistula s/p seton placement of tract + perianal + pudendal block was administered for pain control--Of note, pt had concern at the time for possible vaginal fistula and no additional tracts noted in the anorectum  s/p anal exam under anesthesia with completion fistulotomy on 2025 by Dr. Gibson--of note no vaginal fistula noted on exam.     Brief Synopsis: Patient p/w chronic pain, all vitals stable, she asked for iv dilaudid every 2 hours.She decided to leave ama when I decreased iv dilaudid and I asked gi to see her again.    Lace+ Score: 56  59-90 High Risk  29-58 Medium Risk  0-28   Low Risk  Patient was referred to [facility].    TCM Follow-Up Recommendation:  LACE 29-58: Moderate Risk of readmission after discharge from the hospital.      Procedures during hospitalization:       Incidental or significant findings and recommendations (brief descriptions):      Lab/Test results pending at Discharge:       Consultants:  gi    Discharge Medication List:     Discharge Medications        START taking these medications        Instructions Prescription details   dibucaine 1 % Oint  Commonly known as: Nupercainal      Apply 1 Application topically every 8 (eight) hours as needed.   Quantity: 56 g  Refills: 1     hydrocortisone 25 MG Supp  Commonly known as:  Anusol-HC      Place 1 suppository (25 mg total) rectally daily.   Quantity: 14 suppository  Refills: 2            ASK your doctor about these medications        Instructions Prescription details   Amphetamine-Dextroamphet ER 20 MG Cp24  Commonly known as: ADDERALL XR      Take 1 capsule (20 mg total) by mouth every morning.   Refills: 0     amphetamine-dextroamphetamine 10 MG Tabs  Commonly known as: Adderall      Take 1 tablet (10 mg total) by mouth daily.   Refills: 0     escitalopram 20 MG Tabs  Commonly known as: Lexapro      Take 1 tablet (20 mg total) by mouth in the morning.   Refills: 0     HumaLOG KwikPen 100 UNIT/ML Sopn  Generic drug: Insulin Lispro (1 Unit Dial)      Inject 8-14 Units into the skin 3 (three) times daily with meals. Plus sliding scale. Carb ratio 1-6 grams. Correction 1-30. Total 50 units daily.   Refills: 0     HYDROcodone-acetaminophen  MG Tabs  Commonly known as: Norco  Ask about: Which instructions should I use?      Take 1 tablet by mouth every 6 (six) hours as needed for Pain.   Refills: 0     Naloxone HCl 4 MG/0.1ML Liqd      4 mg by Nasal route as needed. If patient remains unresponsive, repeat dose in other nostril 2-5 minutes after first dose.   Quantity: 1 kit  Refills: 0     polyethylene glycol (PEG 3350) 17 g Pack  Commonly known as: Miralax      Take 17 g by mouth daily.   Quantity: 510 g  Refills: 2     REMICADE IV       Refills: 0     Tresiba FlexTouch 100 UNIT/ML Sopn  Generic drug: insulin degludec      Inject 40 Units into the skin daily. Prime 2 units before each dose   Refills: 0               Where to Get Your Medications        These medications were sent to [facility]/pharmacy #[id number] - [place], [place] - [address] [phone], [phone]  [address], [facility] [address]      Phone: [phone]   dibucaine 1 % Oint  hydrocortisone 25 MG Supp         [place] reviewed:     Follow-up appointment:   Eamon Gibson MD  [address]  [facility] [address]  [phone]    Follow up on 5/15/2025  at 8:45am    [facility]  [address]  [facility] [id number] [phone]  Schedule an appointment as soon as possible for a visit      Appointments for Next 30 Days 2025 - 2025        Date Arrival Time Visit Type Length Department Provider     2025  1:40 PM  FOLLOW-UP OV [88955] 20 min [facility] Gastroenterology,  [facility] Johnny Cabrera DO    Patient Instructions:     Please arrive 15 minutes prior to your scheduled appointment time.                    5/15/2025  8:45 AM  POST OP VISIT [[id number]] 15 min [facility]    Patient Instructions:         Location Instructions:     Masks are optional for all patients and visitors, unless otherwise indicated. Note: A $50 fee will be charged for missed appointments or same-day cancellations. Please provide 24 hours' notice if you need to cancel or reschedule your appointment.                      Vital signs:       Physical Exam:    General: No acute distress   Lungs: clear to auscultation  Cardiovascular: S1, S2  Abdomen: Soft      -----------------------------------------------------------------------------------------------  PATIENT DISCHARGE INSTRUCTIONS: See electronic chart    Sarah Beth Diana MD    Total time spent on discharge plannin minutes     The  Century Cures Act makes medical notes like these available to patients in the interest of transparency. Please be advised this is a medical document. Medical documents are intended to carry relevant information, facts as evident, and the clinical opinion of the practitioner. The medical note is intended as peer to peer communication and may appear blunt or direct. It is written in medical language and may contain abbreviations or verbiage that are unfamiliar.

## 2025-04-30 NOTE — PROGRESS NOTES
Busy signal not able to leave message for patient to call care manager back for transitional care management/hospital follow-up call.     1st attempt

## 2025-04-30 NOTE — PAYOR COMM NOTE
--------------  THIS IS AN OBSERVATION PATIENT  ADMISSION REVIEW     Payor: [employer]  Subscriber #:  [id number]  Authorization Number: [id number]      4/29 ADMIT OBSERVATION     :     ED Provider Notes        ED Provider Notes signed by Brandt Garcia MD at 4/28/2025  4:40 AM       Author: Brandt Garcia MD Service: Emergency Medicine Author Type: Physician    Filed: 4/28/2025  4:40 AM Date of Service: 4/27/2025 10:47 PM Status: Signed    : Brandt Garcia MD (Physician)       Chief Complaint   Patient presents with    Hyperglycemia    Nausea/Vomiting/Diarrhea     Stated Complaint: blood sugars over 600, chrons flare also    Patient is a 40-year-old female presents to ED for evaluation of vomiting, hyperglycemia.  Patient is a type I diabetic on Humalog and Tresiba.  Patient states she has been vomiting today 4 times.  She has had decreased oral intake the last couple days.  Still been taking her diabetic medication.  Patient had fistulotomy 4/11/25 here at [facility].  She states she had some bleeding for couple days after and then stool returned to normal.  She had 2 solid stools yesterday and then today she started having diarrhea with blood in the toilet water.  About 4 episodes of diarrhea today.  She been seen by [facility] about a month earlier and setons were placed.  Patient complains of rectal bleeding last couple days.  She has some mild left lower quadrant pain.  She is on Remicade.  History of DKA in the past.  No shortness of breath.  History of Present Illness          Past Medical History:    Crohn's disease (HCC)    Hidradenitis suppurativa    Type 1 diabetes mellitus (HCC)     ED Triage Vitals [04/27/25 2213]   /66   Pulse 107   Resp 18   Temp 98.2 °F (36.8 °C)   Temp src Oral   SpO2 97 %   O2 Device None (Room air)       Current Vitals:   Vital Signs  BP: 98/57  Pulse: 85  Resp: 16  Temp: 98.2 °F (36.8 °C)  Temp src: Oral  MAP (mmHg):  70    Oxygen Therapy  SpO2: 98 %  O2 Device: None (Room air)        Physical Exam  GENERAL: No acute distress, well appearing and non-toxic, Alert and oriented X 3   HEENT: Normocephalic, atraumatic.  Moist mucous membranes.  Pupils equal round reactive to light and accommodation, extraocular motion is intact, sclerae white, conjunctiva is pink.  Oropharynx is unremarkable, no exudate.  NECK: Supple, trachea midline, no lymphadenopathy.   LUNG: Lungs clear to auscultation bilaterally, no wheezing, no rales, no rhonchi.  CARDIOVASCULAR: Regular rate and rhythm.  Normal S1S2.  No S3S4 or murmur.  ABDOMEN: Bowel sounds are present. Soft. nondistended, no pulsatile masses.  Mild left lower quadrant tenderness.  No rebound, guarding or rigidity  Physical Exam      Labs Reviewed   COMP METABOLIC PANEL (14) - Abnormal; Notable for the following components:       Result Value    Glucose 661 (*)     Sodium 131 (*)     Calculated Osmolality 303 (*)     Alkaline Phosphatase 107 (*)     Bilirubin, Total 0.2 (*)     All other components within normal limits   CBC WITH DIFFERENTIAL WITH PLATELET - Abnormal; Notable for the following components:    RBC 3.44 (*)     HGB 10.6 (*)     HCT 31.3 (*)     All other components within normal limits   VENOUS BLOOD GAS - Abnormal; Notable for the following components:    Venous pH 7.46 (*)     Venous pCO2 30 (*)     Venous pO2 92 (*)     Venous O2Hb 88.5 (*)     All other components within normal limits   ACETONE - Abnormal; Notable for the following components:    Acetone Small (*)     All other components within normal limits   URINALYSIS, ROUTINE - Abnormal; Notable for the following components:    Urine Color Colorless (*)     Spec Gravity >1.030 (*)     Glucose Urine >1000 (*)     Ketones Urine 40 (*)     All other components within normal limits   POCT GLUCOSE - Abnormal; Notable for the following components:    POC Glucose >600 (*)     All other components within normal limits   POCT  GLUCOSE - Abnormal; Notable for the following components:    POC Glucose 499 (*)     All other components within normal limits   POCT GLUCOSE - Abnormal; Notable for the following components:    POC Glucose 273 (*)     All other components within normal limits   LACTIC ACID, PLASMA - Normal   HCG, BETA SUBUNIT, QUAL - Normal   RAINBOW DRAW GOLD   RAINBOW DRAW BLUE   BLOOD CULTURE   BLOOD CULTURE   C. DIFFICILE(TOXIGENIC)PCR   Glucose 661.  Sodium 131.  H  CT ABDOMEN+PELVIS(CONTRAST ONLY)(CPT=74177)  Result Date: 4/28/2025  CONCLUSION:   No perirectal abscess identified; there is a small gas structure toward the left of the rectum, 9 mm.  This is in the region just below the lower uterine segment and may be small amount of air within the vagina.  Whether this air is physiologic or may reflect a fistula from the rectum is very difficult to determine on this CT.  Given the previous surgery, advise surgery referral for follow-up.  A distinct abscess is not seen.  No bowel obstruction ascites or free air.  Increasing desiccated stool likely reflecting constipation.  Other stable findings as above.     Medications   escitalopram (Lexapro) tab 20 mg (has no administration in time range)   glucose (Dex4) 15 GM/59ML oral liquid 15 g (has no administration in time range)     Or   glucose (Glutose) 40% oral gel 15 g (has no administration in time range)     Or   glucose-vitamin C (Dex-4) chewable tab 4 tablet (has no administration in time range)     Or   dextrose 50% injection 50 mL (has no administration in time range)     Or   glucose (Dex4) 15 GM/59ML oral liquid 30 g (has no administration in time range)     Or   glucose (Glutose) 40% oral gel 30 g (has no administration in time range)     Or   glucose-vitamin C (Dex-4) chewable tab 8 tablet (has no administration in time range)   melatonin tab 3 mg (has no administration in time range)   sodium chloride 0.9% infusion ( Intravenous New Bag 4/28/25 0354)   acetaminophen  (Tylenol Extra Strength) tab 500 mg (has no administration in time range)   ondansetron (Zofran) 4 MG/2ML injection 4 mg (has no administration in time range)   prochlorperazine (Compazine) 10 MG/2ML injection 5 mg (has no administration in time range)   insulin degludec (Tresiba) 100 units/mL flextouch 15 Units (has no administration in time range)   insulin aspart (NovoLOG) 100 Units/mL FlexPen 1-10 Units (has no administration in time range)   HYDROmorphone (Dilaudid) 1 MG/ML injection 0.5 mg (has no administration in time range)     Or   HYDROmorphone (Dilaudid) 1 MG/ML injection 1 mg (has no administration in time range)     Or   HYDROmorphone (Dilaudid) 1 MG/ML injection 2 mg (has no administration in time range)   insulin aspart (NovoLOG) 100 Units/mL vial 13 Units (13 Units Subcutaneous Given 4/27/25 2340)   sodium chloride 0.9 % IV bolus 1,000 mL (0 mL Intravenous Stopped 4/28/25 0037)   ondansetron (Zofran) 4 MG/2ML injection 4 mg (4 mg Intravenous Given 4/27/25 2336)   iopamidol 76% (ISOVUE-370) injection for power injector (80 mL Intravenous Given 4/27/25 2348)   HYDROmorphone (Dilaudid) 1 MG/ML injection 1 mg (1 mg Intravenous Given 4/28/25 0101)   Patient 40-year-old female presents to ED for evaluation of hyperglycemia, abdominal pain, bloody stool.  Differential Crohn's exacerbation, DKA, bowel obstruction.  Patient went laboratory testing showing elevated blood sugar of 661.  She was given IV fluids.  Subcutaneous insulin given.  Blood sugar repeated was 499.  No evidence for ketoacidosis.  Patient had CT abdomen pelvis performed given history of Crohn's disease showing small gas structure toward the left of the rectum 9 mm just below the uterine segment may be a small amount of air within the vagina.  Patient will be admitted to the hospital for hyperglycemia, possible Crohn's exacerbation.  May need steroids and GI consultation.  Case discussed with hospitalist, Dr. Schaefer.  Workup and results  were discussed with patient. Patient has no other questions, complaints or concerns. Patient will be admitted to the hospital for further workup.      Clinical Impression:  1. Hyperglycemia    2. Crohn's disease without complication, unspecified gastrointestinal tract location (HCC)        Hospital Problems       Present on Admission  Date Reviewed: 4/22/2025          ICD-10-CM Noted POA    * (Principal) Hyperglycemia R73.9 3/26/2025 Unknown    Crohn's disease without complication, unspecified gastrointestinal tract location (HCC) K50.90 3/26/2025 Unknown          H AND P    Chief Complaint: Vomiting and diarrhea     # GI bleed with diarrhea  - C.diff ordered and pending  - continue IVF  - GI consult  - NPO except meds     # Nausea and vomiting  - continue on antiemetics     # Type 2 diabetes  - Will place on hypoglycemia protocol with long-acting insulin and correction factor insulin.         ENDO  Reason for Consult:   Management recommendations in setting of uncontrolled T1DM       Assessment/Recommendations:  Upon interview today, patient states she was diagnosed with T1DM at age 31; states she follows with endocrinology at [facility] for management. Upon review today, patient states 100% compliance with prescribed doses of home basal and bolus insulin. Patient states multiple hospitalizations and steroid doses have led to uncontrolled glucose over the past month or two. Today, recommended restart of inpatient basal and bolus insulin similar to home doses given patient has been upgraded to soft diet. Per patient, plans in place for OmniPod insulin pump initiation with [facility] team; therefore, recommended patient continue to follow with them for outpatient management. Patient states understanding of and willingness to participate in plan of care.         Plan:  Inpatient recommendations -   Degludec = given elevated fasted AM glucose, recommend increasing to 20u every day   When eating full meals,  patient's home dose is 30u every day   Aspart = given elevated AC/HS glucose values yesterday, recommend initiating low dose sliding scale & 1:10gm CHO  Similar to patient's home meal-time insulin doses    DISCHARGE SUMMARY    Discharge Diagnosis:  Acute on chronic abdominal/rectal pain  Hematochezia   Crohn's disease  Type 1 diabetes         THIS IS AN OBSERVATION PATIENT         -                                                   Signed by Brandt Garcia MD on 4/28/2025  4:40 AM         MEDICATIONS ADMINISTERED IN LAST 1 DAY:  Administration History       None            Vitals (last day) before discharge       Date/Time Temp Pulse Resp BP SpO2 Weight O2 Device O2 Flow Rate (L/min) [facility]    04/29/25 0745 98.4 °F (36.9 °C) 66 17 100/55 94 % -- None (Room air) 0 L/min YO    04/29/25 0347 98.5 °F (36.9 °C) 80 17 117/76 97 % -- None (Room air) 0 L/min MM    04/29/25 0034 98.4 °F (36.9 °C) 76 18 109/62 94 % -- None (Room air) 0 L/min MM    04/28/25 2016 98.3 °F (36.8 °C) 76 17 96/59 95 % -- None (Room air) 0 L/min MM    04/28/25 1700 98.3 °F (36.8 °C) 81 -- 111/63 96 % -- -- -- ST    04/28/25 1200 98.5 °F (36.9 °C) 82 -- 110/63 96 % -- None (Room air) -- ST    04/28/25 0829 -- 84 -- 95/62 97 % -- -- -- ST    04/28/25 0755 98.5 °F (36.9 °C) -- 18 114/80 95 % -- None (Room air) -- ST    04/28/25 0500 97.9 °F (36.6 °C) 78 18 91/59 95 % -- None (Room air) -- SD    04/28/25 0313 -- -- -- -- -- 140 lb 12.8 oz (63.9 kg) -- -- SD    04/28/25 0230 98.2 °F (36.8 °C) 86 18 91/56 96 % -- None (Room air) -- SD    04/28/25 0200 -- 85 16 98/57 98 % -- None (Room air) -- KRUPA    04/28/25 0130 -- 89 10 101/54 97 % -- None (Room air) --     04/28/25 0100 -- 92 11 107/58 97 % -- None (Room air) --     04/28/25 0015 -- 93 16 107/53 97 % -- None (Room air) --           CIWA Scores (last 3 days) before discharge       None

## 2025-04-30 NOTE — PROGRESS NOTES
Hospital follow up.    Last A1C Value: 9.5% Date: [4/27/2025]    TCC request.    Attempt #1:  Left message on voicemail for patient to call transitions specialist back to schedule follow up appointments. Provided Transitions specialist scheduling phone number [phone].

## 2025-05-01 VITALS
HEART RATE: 78 BPM | RESPIRATION RATE: 10 BRPM | SYSTOLIC BLOOD PRESSURE: 120 MMHG | OXYGEN SATURATION: 98 % | WEIGHT: 140 LBS | DIASTOLIC BLOOD PRESSURE: 69 MMHG | BODY MASS INDEX: 23 KG/M2 | TEMPERATURE: 99 F

## 2025-05-01 RX ORDER — LACTULOSE 10 G/15ML
20 SOLUTION ORAL 2 TIMES DAILY
Qty: 180 ML | Refills: 0 | Status: SHIPPED | OUTPATIENT
Start: 2025-05-01 | End: 2025-05-04

## 2025-05-01 RX ORDER — MAGNESIUM CARB/ALUMINUM HYDROX 105-160MG
296 TABLET,CHEWABLE ORAL ONCE
Qty: 296 ML | Refills: 0 | Status: SHIPPED | OUTPATIENT
Start: 2025-05-01 | End: 2025-05-01

## 2025-05-01 RX ORDER — POLYETHYLENE GLYCOL 3350 17 G/17G
POWDER, FOR SOLUTION ORAL
Qty: 28 EACH | Refills: 0 | Status: SHIPPED | OUTPATIENT
Start: 2025-05-01 | End: 2025-05-29

## 2025-05-01 NOTE — PROGRESS NOTES
TCC / DM appointment request (Left AMA (Against Medical Advice on 04/29)    [facility]  [address]  [place] [address]  [phone]    Attempt #2:  Left message on voicemail for patient to call transitions specialist back to schedule follow up appointments. Provided Transitions specialist scheduling phone number [phone].

## 2025-05-01 NOTE — ED INITIAL ASSESSMENT (HPI)
Pt arrives to ed w c/o hyperglycemia. Pt reports BS in the 600s starting a couple days ago. Pt also reports she's having a chrohns flare up starting Sunday w rectal bleeding. +LLQ abd pain. +vomiting.

## 2025-05-01 NOTE — ED PROVIDER NOTES
Patient Seen in: [facility] Emergency Department      History     Chief Complaint   Patient presents with    Hyperglycemia     Stated Complaint: hyperglyecmia, chorn flare up    Subjective:   HPI     40-year-old female with a history of type 1 diabetes and Crohn's disease presents for evaluation of abdominal pain and high blood sugars.  - Abdominal pain in the left lower quadrant, vomiting, and rectal bleeding since Sunday.  - High blood sugars in the 600s for the last couple of days.  - Prior history of multiple surgeries related to Crohn's disease, including recent surgery less than a month ago for fistulas.  - No fever reported, but feeling cold.    Objective:   Past Medical History:    Crohn's disease (HCC)    Hidradenitis suppurativa    Type 1 diabetes mellitus (HCC)              No pertinent past surgical history.              No pertinent social history.            Review of Systems    Positive for stated complaint: hyperglyecmia, chorn flare up  Other systems are as noted in HPI.  Constitutional and vital signs reviewed.      All other systems reviewed and negative except as noted above.    Physical Exam     ED Triage Vitals   BP 04/30/25 2049 116/77   Pulse 04/30/25 2047 81   Resp 04/30/25 2047 16   Temp 04/30/25 2047 98.8 °F (37.1 °C)   Temp src 04/30/25 2047 Temporal   SpO2 04/30/25 2047 97 %   O2 Device 04/30/25 2047 None (Room air)       Current:/69   Pulse 78   Temp 98.8 °F (37.1 °C) (Temporal)   Resp 10   Wt 63.5 kg   SpO2 98%   BMI 23.30 kg/m²         Physical Exam    Constitutional: No apparent distress  Eyes: No scleral icterus  Heart: regular rate rhythm, no murmurs  Lungs: Clear to auscultation bilaterally  Abdomen: Soft, moderately tender in the left lower quadrant, no guarding, no rebound  Skin: No rash  Neuro: Alert and oriented ×3          ED Course/ My interpretations:     Labs Reviewed   CBC WITH DIFFERENTIAL WITH PLATELET - Abnormal; Notable for the following components:        Result Value    RBC 3.38 (*)     HGB 10.6 (*)     HCT 30.8 (*)     Lymphocyte Absolute 0.95 (*)     All other components within normal limits   COMP METABOLIC PANEL (14) - Abnormal; Notable for the following components:    Glucose 639 (*)     Sodium 132 (*)     BUN 8 (*)     Calculated Osmolality 302 (*)     ALT 9 (*)     Bilirubin, Total <0.2 (*)     All other components within normal limits   URINALYSIS, ROUTINE - Abnormal; Notable for the following components:    Urine Color Colorless (*)     Glucose Urine >1000 (*)     All other components within normal limits   VENOUS BLOOD GAS - Abnormal; Notable for the following components:    Venous pO2 96 (*)     Venous HCO3 21.3 (*)     Venous O2Hb 88.4 (*)     All other components within normal limits   POCT GLUCOSE - Abnormal; Notable for the following components:    POC Glucose 548 (*)     All other components within normal limits   POCT GLUCOSE - Abnormal; Notable for the following components:    POC Glucose 458 (*)     All other components within normal limits       Medications given:  Orders Placed This Encounter    CBC With Differential With Platelet    Comp Metabolic Panel (14)    Urinalysis, Routine    Venous Blood Gas    sodium chloride 0.9 % IV bolus 1,000 mL    HYDROmorphone (Dilaudid) 1 MG/ML injection 1 mg    DISCONTD: ondansetron (Zofran) 4 MG/2ML injection 4 mg    HYDROmorphone (Dilaudid) 1 MG/ML injection 1 mg    iopamidol 76% (ISOVUE-370) injection for power injector    insulin aspart (NovoLOG) 100 Units/mL vial 13 Units    lactulose 20 GM/30ML Oral Solution    magnesium citrate 1.745 GM/30ML Oral Solution    polyethylene glycol, PEG 3350, 17 g Oral Powd Pack            MDM      Extensive differential diagnosis was considered for the patient including appendicitis, gastroenteritis, cholecystitis, pancreatitis, diverticulitis, urinary tract infection, perforated viscus, mesenteric ischemia, peptic ulcer disease, pyelonephritis, nephrolithiasis, and  other GI, urologic, gynecologic, infectious, vascular and other pathology.    Patient's hyperglycemia improved with IV fluids, subcu insulin then administered.  DKA or other complication of diabetes ruled out.    Given the patient's clinical picture CT of the abdomen pelvis was deemed clinically necessary.  Results reviewed ruling out surgical or other emergent pathology.  Significant constipation discussed with the patient with the recommendation to avoid narcotics.    Patient is improved.      Based on the patient's history, physical exam and emergency department workup I do not suspect underlying surgical pathology.  Patient can be safely discharged home with outpatient follow-up.  They will follow-up as directed.    Patient demonstrated understanding, they are comfortable with the plan and will follow-up as directed.        Disposition and Plan     Clinical Impression:  1. Abdominal pain, acute    2. Type 1 diabetes mellitus with hyperglycemia (HCC)         Disposition:  Discharge  5/1/2025 12:21 am    Follow-up:  your gastroenterologist    Schedule an appointment as soon as possible for a visit  For recheck    your PCP    Schedule an appointment as soon as possible for a visit in 1 day(s)  For recheck          Medications Prescribed:  Discharge Medication List as of 5/1/2025 12:22 AM        START taking these medications    Details   lactulose 20 GM/30ML Oral Solution Take 30 mL (20 g total) by mouth 2 (two) times daily for 3 days., Normal, Disp-180 mL, R-0      magnesium citrate 1.745 GM/30ML Oral Solution Take 296 mL by mouth once for 1 dose., Normal, Disp-296 mL, R-0      !! polyethylene glycol, PEG 3350, 17 g Oral Powd Pack Take 17 g by mouth daily for 14 days, THEN 17 g daily as needed., Normal, Disp-28 each, R-0       !! - Potential duplicate medications found. Please discuss with provider.          Supplementary Documentation:                                                                  Documentation  created with the aid of Dragon voice recognition software.  Although efforts were made to ensure the accuracy of the note, some inaccuracies may persist.

## 2025-05-01 NOTE — PROGRESS NOTES
Left message on mailbox for patient to call care manager back for transitional care management/hospital follow-up call.  Care  information included in message.     3rd attempt

## 2025-05-02 NOTE — PROGRESS NOTES
Hospital follow up.    Last A1C Value: 9.5% Date: [4/27/2025]    TCC request.    Attempt #3:  Left message on voicemail for patient to call transitions specialist back to schedule follow up appointments. Provided Transitions specialist scheduling phone number [phone]. Closing encounter. Will re-open if patient returns call.

## 2025-05-03 ENCOUNTER — HOSPITAL ENCOUNTER (EMERGENCY)
Facility: HOSPITAL | Age: 40
Discharge: HOME | End: 2025-05-04
Attending: EMERGENCY MEDICINE
Payer: COMMERCIAL

## 2025-05-03 DIAGNOSIS — K50.90 CROHN'S DISEASE WITHOUT COMPLICATION, UNSPECIFIED GASTROINTESTINAL TRACT LOCATION (HCC): ICD-10-CM

## 2025-05-03 DIAGNOSIS — G89.29 CHRONIC ABDOMINAL PAIN: Primary | ICD-10-CM

## 2025-05-03 DIAGNOSIS — E11.65 TYPE 2 DIABETES MELLITUS WITH HYPERGLYCEMIA, WITHOUT LONG-TERM CURRENT USE OF INSULIN (HCC): ICD-10-CM

## 2025-05-03 DIAGNOSIS — R10.9 CHRONIC ABDOMINAL PAIN: Primary | ICD-10-CM

## 2025-05-03 LAB — GLUCOSE BLD-MCNC: 325 MG/DL (ref 70–99)

## 2025-05-03 PROCEDURE — 96375 TX/PRO/DX INJ NEW DRUG ADDON: CPT

## 2025-05-03 PROCEDURE — 96361 HYDRATE IV INFUSION ADD-ON: CPT

## 2025-05-03 PROCEDURE — 96374 THER/PROPH/DIAG INJ IV PUSH: CPT

## 2025-05-03 PROCEDURE — 85652 RBC SED RATE AUTOMATED: CPT | Performed by: EMERGENCY MEDICINE

## 2025-05-03 PROCEDURE — 85025 COMPLETE CBC W/AUTO DIFF WBC: CPT | Performed by: EMERGENCY MEDICINE

## 2025-05-03 PROCEDURE — 99285 EMERGENCY DEPT VISIT HI MDM: CPT

## 2025-05-03 PROCEDURE — 99284 EMERGENCY DEPT VISIT MOD MDM: CPT

## 2025-05-03 PROCEDURE — 82962 GLUCOSE BLOOD TEST: CPT

## 2025-05-04 ENCOUNTER — APPOINTMENT (OUTPATIENT)
Dept: CT IMAGING | Facility: HOSPITAL | Age: 40
End: 2025-05-04
Attending: EMERGENCY MEDICINE
Payer: COMMERCIAL

## 2025-05-04 ENCOUNTER — HOSPITAL ENCOUNTER (EMERGENCY)
Age: 40
Discharge: HOME | End: 2025-05-04
Attending: EMERGENCY MEDICINE

## 2025-05-04 ENCOUNTER — APPOINTMENT (OUTPATIENT)
Dept: CT IMAGING | Age: 40
End: 2025-05-04
Attending: EMERGENCY MEDICINE

## 2025-05-04 VITALS
OXYGEN SATURATION: 97 % | BODY MASS INDEX: 25.09 KG/M2 | HEIGHT: 65 IN | TEMPERATURE: 98.1 F | WEIGHT: 150.57 LBS | HEART RATE: 90 BPM | DIASTOLIC BLOOD PRESSURE: 56 MMHG | SYSTOLIC BLOOD PRESSURE: 97 MMHG | RESPIRATION RATE: 18 BRPM

## 2025-05-04 VITALS
HEART RATE: 72 BPM | SYSTOLIC BLOOD PRESSURE: 121 MMHG | RESPIRATION RATE: 16 BRPM | OXYGEN SATURATION: 98 % | TEMPERATURE: 98 F | DIASTOLIC BLOOD PRESSURE: 77 MMHG | BODY MASS INDEX: 23.32 KG/M2 | HEIGHT: 65 IN | WEIGHT: 140 LBS

## 2025-05-04 DIAGNOSIS — K62.89 RECTAL PAIN: Primary | ICD-10-CM

## 2025-05-04 LAB
ALBUMIN SERPL-MCNC: 3.5 G/DL (ref 3.4–5)
ALBUMIN SERPL-MCNC: 4.7 G/DL (ref 3.2–4.8)
ALBUMIN/GLOB SERPL: 1.1 {RATIO} (ref 1–2.4)
ALBUMIN/GLOB SERPL: 2.1 {RATIO} (ref 1–2)
ALP LIVER SERPL-CCNC: 85 U/L (ref 37–98)
ALP SERPL-CCNC: 92 UNITS/L (ref 45–117)
ALT SERPL-CCNC: 10 U/L (ref 10–49)
ALT SERPL-CCNC: 16 UNITS/L
ANION GAP SERPL CALC-SCNC: 8 MMOL/L (ref 7–19)
ANION GAP SERPL CALC-SCNC: 9 MMOL/L (ref 0–18)
APPEARANCE UR: CLEAR
AST SERPL-CCNC: 17 UNITS/L
AST SERPL-CCNC: 18 U/L (ref ?–34)
B-HCG UR QL: NEGATIVE
B-OH-BUTYR SERPL-SCNC: <0.1 MMOL/L (ref 0–0.3)
BACTERIA #/AREA URNS HPF: ABNORMAL /HPF
BASOPHILS # BLD AUTO: 0.03 X10(3) UL (ref 0–0.2)
BASOPHILS # BLD: 0 K/MCL (ref 0–0.3)
BASOPHILS NFR BLD AUTO: 0.5 %
BASOPHILS NFR BLD: 1 %
BILIRUB SERPL-MCNC: 0.2 MG/DL (ref 0.2–1)
BILIRUB SERPL-MCNC: 0.2 MG/DL (ref 0.3–1.2)
BILIRUB UR QL STRIP: NEGATIVE
BUN BLD-MCNC: 6 MG/DL (ref 9–23)
BUN SERPL-MCNC: 6 MG/DL (ref 6–20)
BUN/CREAT SERPL: 13 (ref 7–25)
CALCIUM BLD-MCNC: 9.5 MG/DL (ref 8.7–10.6)
CALCIUM SERPL-MCNC: 8.9 MG/DL (ref 8.4–10.2)
CHLORIDE SERPL-SCNC: 101 MMOL/L (ref 98–112)
CHLORIDE SERPL-SCNC: 113 MMOL/L (ref 97–110)
CO2 SERPL-SCNC: 23 MMOL/L (ref 21–32)
CO2 SERPL-SCNC: 25 MMOL/L (ref 21–32)
COLOR UR: COLORLESS
CREAT BLD-MCNC: 0.76 MG/DL (ref 0.55–1.02)
CREAT SERPL-MCNC: 0.47 MG/DL (ref 0.51–0.95)
CRP SERPL-MCNC: <0.4 MG/DL (ref ?–0.5)
CRP SERPL-MCNC: <5 MG/L
DEPRECATED RDW RBC: 53.2 FL (ref 39–50)
EGFRCR SERPLBLD CKD-EPI 2021: 102 ML/MIN/1.73M2 (ref 60–?)
EGFRCR SERPLBLD CKD-EPI 2021: >90 ML/MIN/{1.73_M2}
EOSINOPHIL # BLD AUTO: 0.03 X10(3) UL (ref 0–0.7)
EOSINOPHIL # BLD: 0.1 K/MCL (ref 0–0.5)
EOSINOPHIL NFR BLD AUTO: 0.5 %
EOSINOPHIL NFR BLD: 1 %
ERYTHROCYTE [DISTWIDTH] IN BLOOD BY AUTOMATED COUNT: 15.2 %
ERYTHROCYTE [DISTWIDTH] IN BLOOD: 15.5 % (ref 11–15)
ERYTHROCYTE [SEDIMENTATION RATE] IN BLOOD BY WESTERGREN METHOD: 10 MM/HR (ref 0–20)
ERYTHROCYTE [SEDIMENTATION RATE] IN BLOOD: 14 MM/HR (ref 0–20)
FASTING DURATION TIME PATIENT: ABNORMAL H
GLOBULIN PLAS-MCNC: 2.2 G/DL (ref 2–3.5)
GLOBULIN SER-MCNC: 3.1 G/DL (ref 2–4)
GLUCOSE BLD-MCNC: 260 MG/DL (ref 70–99)
GLUCOSE BLD-MCNC: 315 MG/DL (ref 70–99)
GLUCOSE BLD-MCNC: 403 MG/DL (ref 70–99)
GLUCOSE BLDC GLUCOMTR-MCNC: 130 MG/DL (ref 70–99)
GLUCOSE SERPL-MCNC: 99 MG/DL (ref 70–99)
GLUCOSE UR STRIP-MCNC: >1000 MG/DL
HCT VFR BLD AUTO: 31.5 % (ref 35–48)
HCT VFR BLD CALC: 32.9 % (ref 36–46.5)
HGB BLD-MCNC: 10.6 G/DL (ref 12–16)
HGB BLD-MCNC: 10.8 G/DL (ref 12–15.5)
HGB UR QL STRIP: NEGATIVE
HYALINE CASTS #/AREA URNS LPF: ABNORMAL /LPF
IMM GRANULOCYTES # BLD AUTO: 0 K/MCL (ref 0–0.2)
IMM GRANULOCYTES # BLD AUTO: 0.01 X10(3) UL (ref 0–1)
IMM GRANULOCYTES # BLD: 0 %
IMM GRANULOCYTES NFR BLD: 0.2 %
KETONES UR STRIP-MCNC: NEGATIVE MG/DL
LEUKOCYTE ESTERASE UR QL STRIP: ABNORMAL
LIPASE SERPL-CCNC: 19 UNITS/L (ref 15–77)
LYMPHOCYTES # BLD AUTO: 1.38 X10(3) UL (ref 1–4)
LYMPHOCYTES # BLD: 2.7 K/MCL (ref 1–4.8)
LYMPHOCYTES NFR BLD AUTO: 23.2 %
LYMPHOCYTES NFR BLD: 38 %
MAGNESIUM SERPL-MCNC: 1.9 MG/DL (ref 1.7–2.4)
MCH RBC QN AUTO: 30.6 PG (ref 26–34)
MCH RBC QN AUTO: 30.7 PG (ref 26–34)
MCHC RBC AUTO-ENTMCNC: 32.8 G/DL (ref 32–36.5)
MCHC RBC AUTO-ENTMCNC: 33.7 G/DL (ref 31–37)
MCV RBC AUTO: 91.3 FL (ref 80–100)
MCV RBC AUTO: 93.2 FL (ref 78–100)
MONOCYTES # BLD AUTO: 0.48 X10(3) UL (ref 0.1–1)
MONOCYTES # BLD: 0.5 K/MCL (ref 0.3–0.9)
MONOCYTES NFR BLD AUTO: 8.1 %
MONOCYTES NFR BLD: 8 %
NEUTROPHILS # BLD AUTO: 4.03 X10 (3) UL (ref 1.5–7.7)
NEUTROPHILS # BLD AUTO: 4.03 X10(3) UL (ref 1.5–7.7)
NEUTROPHILS # BLD: 3.8 K/MCL (ref 1.8–7.7)
NEUTROPHILS NFR BLD AUTO: 67.5 %
NEUTROPHILS NFR BLD: 52 %
NITRITE UR QL STRIP: NEGATIVE
NRBC BLD MANUAL-RTO: 0 /100 WBC
OSMOLALITY SERPL CALC.SUM OF ELEC: 295 MOSM/KG (ref 275–295)
PH UR STRIP: 7 [PH] (ref 5–7)
PLATELET # BLD AUTO: 334 10(3)UL (ref 150–450)
PLATELET # BLD AUTO: 448 K/MCL (ref 140–450)
PLATELETS.RETICULATED NFR BLD AUTO: 4.3 % (ref 0–7)
POTASSIUM SERPL-SCNC: 3.7 MMOL/L (ref 3.4–5.1)
POTASSIUM SERPL-SCNC: 4 MMOL/L (ref 3.5–5.1)
PROT SERPL-MCNC: 6.6 G/DL (ref 6.4–8.2)
PROT SERPL-MCNC: 6.9 G/DL (ref 5.7–8.2)
PROT UR STRIP-MCNC: NEGATIVE MG/DL
RBC # BLD AUTO: 3.45 X10(6)UL (ref 3.8–5.3)
RBC # BLD: 3.53 MIL/MCL (ref 4–5.2)
RBC #/AREA URNS HPF: ABNORMAL /HPF
SODIUM SERPL-SCNC: 135 MMOL/L (ref 136–145)
SODIUM SERPL-SCNC: 140 MMOL/L (ref 135–145)
SP GR UR STRIP: >1.03 (ref 1–1.03)
SQUAMOUS #/AREA URNS HPF: ABNORMAL /HPF
UROBILINOGEN UR STRIP-MCNC: 0.2 MG/DL
WBC # BLD AUTO: 6 X10(3) UL (ref 4–11)
WBC # BLD: 7.2 K/MCL (ref 4.2–11)
WBC #/AREA URNS HPF: ABNORMAL /HPF

## 2025-05-04 PROCEDURE — 96376 TX/PRO/DX INJ SAME DRUG ADON: CPT

## 2025-05-04 PROCEDURE — 82962 GLUCOSE BLOOD TEST: CPT

## 2025-05-04 PROCEDURE — 83735 ASSAY OF MAGNESIUM: CPT | Performed by: EMERGENCY MEDICINE

## 2025-05-04 PROCEDURE — 80053 COMPREHEN METABOLIC PANEL: CPT | Performed by: EMERGENCY MEDICINE

## 2025-05-04 PROCEDURE — 10002807 HB RX 258: Performed by: EMERGENCY MEDICINE

## 2025-05-04 PROCEDURE — 99284 EMERGENCY DEPT VISIT MOD MDM: CPT | Performed by: EMERGENCY MEDICINE

## 2025-05-04 PROCEDURE — 85025 COMPLETE CBC W/AUTO DIFF WBC: CPT | Performed by: EMERGENCY MEDICINE

## 2025-05-04 PROCEDURE — 86140 C-REACTIVE PROTEIN: CPT | Performed by: EMERGENCY MEDICINE

## 2025-05-04 PROCEDURE — 87086 URINE CULTURE/COLONY COUNT: CPT | Performed by: EMERGENCY MEDICINE

## 2025-05-04 PROCEDURE — 85652 RBC SED RATE AUTOMATED: CPT | Performed by: EMERGENCY MEDICINE

## 2025-05-04 PROCEDURE — 81025 URINE PREGNANCY TEST: CPT

## 2025-05-04 PROCEDURE — 74177 CT ABD & PELVIS W/CONTRAST: CPT | Performed by: EMERGENCY MEDICINE

## 2025-05-04 PROCEDURE — 83690 ASSAY OF LIPASE: CPT | Performed by: EMERGENCY MEDICINE

## 2025-05-04 PROCEDURE — 81001 URINALYSIS AUTO W/SCOPE: CPT | Performed by: EMERGENCY MEDICINE

## 2025-05-04 PROCEDURE — 82010 KETONE BODYS QUAN: CPT | Performed by: EMERGENCY MEDICINE

## 2025-05-04 PROCEDURE — 10002800 HB RX 250 W HCPCS: Performed by: EMERGENCY MEDICINE

## 2025-05-04 PROCEDURE — 96374 THER/PROPH/DIAG INJ IV PUSH: CPT

## 2025-05-04 RX ORDER — INSULIN ASPART 100 [IU]/ML
0.2 INJECTION, SOLUTION INTRAVENOUS; SUBCUTANEOUS ONCE
Status: DISCONTINUED | OUTPATIENT
Start: 2025-05-04 | End: 2025-05-04

## 2025-05-04 RX ORDER — HYDROMORPHONE HYDROCHLORIDE 1 MG/ML
1 INJECTION, SOLUTION INTRAMUSCULAR; INTRAVENOUS; SUBCUTANEOUS ONCE
Refills: 0 | Status: COMPLETED | OUTPATIENT
Start: 2025-05-04 | End: 2025-05-04

## 2025-05-04 RX ORDER — DIPHENHYDRAMINE HYDROCHLORIDE 50 MG/ML
25 INJECTION, SOLUTION INTRAMUSCULAR; INTRAVENOUS ONCE
Status: COMPLETED | OUTPATIENT
Start: 2025-05-04 | End: 2025-05-04

## 2025-05-04 RX ORDER — MORPHINE SULFATE 4 MG/ML
4 INJECTION, SOLUTION INTRAMUSCULAR; INTRAVENOUS ONCE
Status: DISCONTINUED | OUTPATIENT
Start: 2025-05-04 | End: 2025-05-04

## 2025-05-04 RX ORDER — INSULIN ASPART 100 [IU]/ML
0.15 INJECTION, SOLUTION INTRAVENOUS; SUBCUTANEOUS ONCE
Status: COMPLETED | OUTPATIENT
Start: 2025-05-04 | End: 2025-05-04

## 2025-05-04 RX ADMIN — SODIUM CHLORIDE 1000 ML: 9 INJECTION, SOLUTION INTRAVENOUS at 04:56

## 2025-05-04 RX ADMIN — FENTANYL CITRATE 100 MCG: 50 INJECTION INTRAMUSCULAR; INTRAVENOUS at 03:55

## 2025-05-04 RX ADMIN — FENTANYL CITRATE 100 MCG: 50 INJECTION INTRAMUSCULAR; INTRAVENOUS at 04:55

## 2025-05-04 SDOH — SOCIAL STABILITY: SOCIAL INSECURITY: HOW OFTEN DOES ANYONE, INCLUDING FAMILY AND FRIENDS, SCREAM OR CURSE AT YOU?: NEVER

## 2025-05-04 SDOH — SOCIAL STABILITY: SOCIAL INSECURITY: HOW OFTEN DOES ANYONE, INCLUDING FAMILY AND FRIENDS, PHYSICALLY HURT YOU?: NEVER

## 2025-05-04 SDOH — SOCIAL STABILITY: SOCIAL INSECURITY: HOW OFTEN DOES ANYONE, INCLUDING FAMILY AND FRIENDS, INSULT OR TALK DOWN TO YOU?: NEVER

## 2025-05-04 SDOH — SOCIAL STABILITY: SOCIAL INSECURITY: HOW OFTEN DOES ANYONE, INCLUDING FAMILY AND FRIENDS, THREATEN YOU WITH HARM?: NEVER

## 2025-05-04 ASSESSMENT — PAIN DESCRIPTION - PAIN TYPE: TYPE: ACUTE PAIN

## 2025-05-04 ASSESSMENT — PAIN SCALES - GENERAL
PAINLEVEL_OUTOF10: 8
PAINLEVEL_OUTOF10: 7
PAINLEVEL_OUTOF10: 8

## 2025-05-04 NOTE — ED PROVIDER NOTES
Patient Seen in: [facility] Emergency Department      History     Chief Complaint   Patient presents with    Hyperglycemia     Stated Complaint: crohns flare, hyperglycemia    Subjective:   HPI    40-year-old female with a history of Crohn's disease, who reports significant abdominal pain and rectal bleeding. She has been experiencing a flare-up of her Crohn's disease, characterized by vomiting and an inability to keep food down. She describes the pain as severe, to the point where she can only sit on her side. Two weeks ago, she underwent a fistulectomy following the removal of setons that were previously inserted to manage fistulas. She continues to experience rectal bleeding and diarrhea. The patient has been on steroids during hospitalizations, which have previously led to episodes of diabetic ketoacidosis (DKA). Her blood sugar levels have been elevated, though not as high as previous episodes when they reached 700. She has been managing her blood sugar with insulin at home, but they continue to fluctuate. She is under the care of a GI specialist at the [facility] and has been referred to the emergency department by her GI doctor for further evaluation.  History of Present Illness               Objective:     Past Medical History:    Crohn's disease (HCC)    Hidradenitis suppurativa    Type 1 diabetes mellitus (HCC)              Past Surgical History:   Procedure Laterality Date    I&d deep absc neck/chst+rib cut      Other surgical history  04/02/2025    [facility]. rectal fistula repair    Repair of anal fistula w/fibrin glue                  Social History     Socioeconomic History    Marital status:    Tobacco Use    Smoking status: Never    Smokeless tobacco: Never   Vaping Use    Vaping status: Never Used   Substance and Sexual Activity    Alcohol use: Not Currently    Drug use: Never     Social Drivers of Health     Food Insecurity: No Food Insecurity (4/28/2025)    NCSS - Food  Insecurity     Worried About Running Out of Food in the Last Year: No     Ran Out of Food in the Last Year: No   Transportation Needs: No Transportation Needs (4/28/2025)    NCSS - Transportation     Lack of Transportation: No   Housing Stability: Not At Risk (4/28/2025)    NCSS - Housing/Utilities     Has Housing: Yes     Worried About Losing Housing: No     Unable to Get Utilities: No                                Physical Exam     ED Triage Vitals [05/03/25 2301]   /70   Pulse 86   Resp 16   Temp 98.3 °F (36.8 °C)   Temp src Temporal   SpO2 98 %   O2 Device None (Room air)       Current Vitals:   Vital Signs  BP: 121/77  Pulse: 72  Resp: 16  Temp: 98.3 °F (36.8 °C)  Temp src: Temporal  MAP (mmHg): 90    Oxygen Therapy  SpO2: 98 %  O2 Device: None (Room air)        Physical Exam    Vital signs reviewed  General appearance: Patient is alert and in moderate pain distress  HEENT: Pupils equal react to light extraocular muscles intact no scleral icterus, mucous membranes are moist, there is no erythema or exudate in the posterior pharynx  Neck: Supple no JVD no lymphadenopathy no meningismus no carotid bruit  CV: Regular rate and rhythm no murmur rub  Respiratory: Clear to auscultation bilaterally no crackles no wheezes no accessory muscle use  Abdomen: Left lower quadrant tenderness on exam, no rebound no guarding  no hepatosplenomegaly bowel sounds are present , no pulsatile mass  Extremities: No clubbing cyanosis or edema 2+ distal pulses.  Neuro: Cranial nerves II through XII intact with no gross focal sensory or motor abnormality.    Physical Exam                ED Course     Labs Reviewed   COMP METABOLIC PANEL (14) - Abnormal; Notable for the following components:       Result Value    Glucose 403 (*)     Sodium 135 (*)     BUN 6 (*)     Bilirubin, Total 0.2 (*)     A/G Ratio 2.1 (*)     All other components within normal limits   CBC WITH DIFFERENTIAL WITH PLATELET - Abnormal; Notable for the following  components:    RBC 3.45 (*)     HGB 10.6 (*)     HCT 31.5 (*)     All other components within normal limits   POCT GLUCOSE - Abnormal; Notable for the following components:    POC Glucose 325 (*)     All other components within normal limits   POCT GLUCOSE - Abnormal; Notable for the following components:    POC Glucose 315 (*)     All other components within normal limits   C-REACTIVE PROTEIN - Normal   POCT PREGNANCY URINE - Normal   SCAN SLIDE   SED RATE, WESTERGREN (AUTOMATED)          Results     Patient was evaluated had a CBC chemistry sed rate CRP done.  Also glucose was 325.  Was given some IV fluids will give her 1 dose of pain medication.  She states she is allergic to morphine and she does have a care plan but does seem uncomfortable so we will give her 1 mg of Dilaudid but that will be it.  Also was given some insulin.  Will recheck her sugar after the fluid and insulin.  CT scan will be done      CT abdomen and pelvis with contrast      IMPRESSION:    Comparison: Prior exams dated 5/1/2025, 4/30/2025, 3/26/2025    The liver, gallbladder, spleen, pancreas, and adrenals appear normal.  Indeterminate hypodense lesion in the left kidney, unchanged, measuring 15 x 19 mm.  The kidneys show no acute process.  Stomach, small bowel, and appendix appear normal.  The colon appears normal.  No ascites.  Intrauterine contraceptive device in typical position.  Normal urinary bladder.  Unremarkable lung bases.  Normal bone structures.           CT does not show any significant inflammatory process in the colon which is good.  Also sed rate CRP were normal.  Patient should follow-up with GI she should continue monitoring her sugar and do a sliding scale as instructed.  Follow-up with GI return if any worsening issues    MDM      Differential diagnosis reflecting the complexity of care include: Crohn's flareup, hyperglycemia, uncontrolled diabetes, dehydration, chronic abdominal pain    Comorbidities that add  complexity to management include: Crohn's disease and multiple surgeries, diabetes    External chart review was done and was noted: ER note from the other day was reviewed and patient had presented with abdominal pain but did not have a CT scan.      My independent interpretation of studies of: CT scan unremarkable no inflammatory processes within the colon no abscesses.        Social determinants of health that affect care: Patient needs to do better with her sliding scale and continue giving insulin drink plenty of fluids.  Follow-up with endocrinology and GI    Shared decision making was done by self and patient.  She be discharged home follow-up with her GI physician at this point do not feel needs to be on steroids.  There was no inflammatory process noted.  Needs to keep monitoring her sugar closely return if cannot get it under control            Medical Decision Making      Disposition and Plan     Clinical Impression:  1. Chronic abdominal pain    2. Type 2 diabetes mellitus with hyperglycemia, without long-term current use of insulin (HCC)    3. Crohn's disease without complication, unspecified gastrointestinal tract location (HCC)         Disposition:  Discharge  5/4/2025  2:34 am    Follow-up:  Your GI physician and endocrinologist    Follow up            Medications Prescribed:  Current Discharge Medication List          Supplementary Documentation: Patient was screened and evaluated during this visit.  As the treating physician attending to the patient, I determined within reasonable clinical confidence and prior to discharge, that an emergency medical condition was not or was no longer present.  There was no indication for further evaluation, treatment, or admission on an emergency basis.  Comprehensive verbal and written discharge and follow-up instructions were provided to help prevent relapse or worsening.  Patient was instructed to follow-up with primary care provider for further evaluation  treatment, return immediately to ER for worsening, concerning, new, or changing/persisting symptoms.  I discussed the case with the patient and they had no questions, complaints, or concerns.  Patient was comfortable going home.      Dictation Disclaimer Note:   To increase efficiency this document may have been prepared using voice recognition technology. Every effort has been made to correct any errors made during preparation of this note. However, if a word or phrase is confusing, or does not make sense, this is likely due to a recognition error within the program which was not discovered during editing. Please do not hesitate to contact to address any significant errors.    Note to Patient:   The 21st Century Cures Act makes medical notes like these available to patients in the interest of transparency. Please be advised this is a medical document. Medical documents are intended to carry relevant information, facts as evident, and the clinical opinion of the practitioner. The medical note is intended as peer to peer communication and may appear blunt or direct. It is written in medical language and may contain abbreviations or verbiage that are unfamiliar.

## 2025-05-04 NOTE — ED INITIAL ASSESSMENT (HPI)
Known DM I with hx of Crohns dse. [place] that having \"flare ups\" of Dse process. Elevated blood sugar today >400s as claimed, given self 10 units insulin PTA. Seen here multiple times this week for same sx

## 2025-05-05 ENCOUNTER — RESULTS FOLLOW-UP (OUTPATIENT)
Dept: PHARMACY | Age: 40
End: 2025-05-05

## 2025-05-05 LAB
BACTERIA UR CULT: ABNORMAL
BACTERIA UR CULT: ABNORMAL
INFLIXIMAB AB: 260 NG/ML
INFLIXIMAB LVL: <0.4 UG/ML

## 2025-05-13 ENCOUNTER — HOSPITAL ENCOUNTER (EMERGENCY)
Facility: HOSPITAL | Age: 40
Discharge: HOME | End: 2025-05-14
Attending: EMERGENCY MEDICINE
Payer: COMMERCIAL

## 2025-05-13 DIAGNOSIS — R73.9 HYPERGLYCEMIA: Primary | ICD-10-CM

## 2025-05-13 DIAGNOSIS — R11.2 NAUSEA AND VOMITING, UNSPECIFIED VOMITING TYPE: ICD-10-CM

## 2025-05-13 DIAGNOSIS — G89.29 CHRONIC ABDOMINAL PAIN: ICD-10-CM

## 2025-05-13 DIAGNOSIS — R10.9 CHRONIC ABDOMINAL PAIN: ICD-10-CM

## 2025-05-13 LAB
BASOPHILS # BLD AUTO: 0.05 X10(3) UL (ref 0–0.2)
BASOPHILS NFR BLD AUTO: 0.7 %
EOSINOPHIL # BLD AUTO: 0.03 X10(3) UL (ref 0–0.7)
EOSINOPHIL NFR BLD AUTO: 0.4 %
ERYTHROCYTE [DISTWIDTH] IN BLOOD BY AUTOMATED COUNT: 15.1 %
GLUCOSE BLD-MCNC: 534 MG/DL (ref 70–99)
GLUCOSE BLD-MCNC: 557 MG/DL (ref 70–99)
HCT VFR BLD AUTO: 36.3 % (ref 35–48)
HGB BLD-MCNC: 12.2 G/DL (ref 12–16)
IMM GRANULOCYTES # BLD AUTO: 0.04 X10(3) UL (ref 0–1)
IMM GRANULOCYTES NFR BLD: 0.5 %
LYMPHOCYTES # BLD AUTO: 1.05 X10(3) UL (ref 1–4)
LYMPHOCYTES NFR BLD AUTO: 14.4 %
MCH RBC QN AUTO: 31 PG (ref 26–34)
MCHC RBC AUTO-ENTMCNC: 33.6 G/DL (ref 31–37)
MCV RBC AUTO: 92.1 FL (ref 80–100)
MONOCYTES # BLD AUTO: 0.61 X10(3) UL (ref 0.1–1)
MONOCYTES NFR BLD AUTO: 8.3 %
NEUTROPHILS # BLD AUTO: 5.53 X10 (3) UL (ref 1.5–7.7)
NEUTROPHILS # BLD AUTO: 5.53 X10(3) UL (ref 1.5–7.7)
NEUTROPHILS NFR BLD AUTO: 75.7 %
PLATELET # BLD AUTO: 396 10(3)UL (ref 150–450)
RBC # BLD AUTO: 3.94 X10(6)UL (ref 3.8–5.3)
WBC # BLD AUTO: 7.3 X10(3) UL (ref 4–11)

## 2025-05-13 PROCEDURE — 99285 EMERGENCY DEPT VISIT HI MDM: CPT

## 2025-05-13 PROCEDURE — 85025 COMPLETE CBC W/AUTO DIFF WBC: CPT | Performed by: EMERGENCY MEDICINE

## 2025-05-13 PROCEDURE — 80053 COMPREHEN METABOLIC PANEL: CPT | Performed by: EMERGENCY MEDICINE

## 2025-05-13 PROCEDURE — 96361 HYDRATE IV INFUSION ADD-ON: CPT

## 2025-05-13 PROCEDURE — 82962 GLUCOSE BLOOD TEST: CPT

## 2025-05-13 PROCEDURE — 82803 BLOOD GASES ANY COMBINATION: CPT | Performed by: EMERGENCY MEDICINE

## 2025-05-13 RX ORDER — SODIUM CHLORIDE 9 MG/ML
INJECTION, SOLUTION INTRAVENOUS CONTINUOUS
Status: DISCONTINUED | OUTPATIENT
Start: 2025-05-14 | End: 2025-05-14

## 2025-05-13 NOTE — PROGRESS NOTES
Multiple attempts to reach patient and messages left with no return call.  Past Transitional Care Management timeframe 5/13/25.  Encounter closing.

## 2025-05-14 ENCOUNTER — TELEPHONE (OUTPATIENT)
Facility: LOCATION | Age: 40
End: 2025-05-14

## 2025-05-14 VITALS
SYSTOLIC BLOOD PRESSURE: 108 MMHG | DIASTOLIC BLOOD PRESSURE: 70 MMHG | HEIGHT: 65 IN | WEIGHT: 140 LBS | OXYGEN SATURATION: 98 % | RESPIRATION RATE: 19 BRPM | HEART RATE: 91 BPM | TEMPERATURE: 99 F | BODY MASS INDEX: 23.32 KG/M2

## 2025-05-14 PROBLEM — J30.1 NON-SEASONAL ALLERGIC RHINITIS DUE TO POLLEN: Status: ACTIVE | Noted: 2025-05-14

## 2025-05-14 LAB
ALBUMIN SERPL-MCNC: 4.8 G/DL (ref 3.2–4.8)
ALBUMIN/GLOB SERPL: 2 {RATIO} (ref 1–2)
ALP LIVER SERPL-CCNC: 90 U/L (ref 37–98)
ALT SERPL-CCNC: 11 U/L (ref 10–49)
ANION GAP SERPL CALC-SCNC: 9 MMOL/L (ref 0–18)
AST SERPL-CCNC: 30 U/L (ref ?–34)
BASE EXCESS BLDV CALC-SCNC: 2.9 MMOL/L
BILIRUB SERPL-MCNC: 0.2 MG/DL (ref 0.3–1.2)
BILIRUB UR QL STRIP.AUTO: NEGATIVE
BUN BLD-MCNC: 12 MG/DL (ref 9–23)
CALCIUM BLD-MCNC: 9.9 MG/DL (ref 8.7–10.6)
CHLORIDE SERPL-SCNC: 99 MMOL/L (ref 98–112)
CLARITY UR REFRACT.AUTO: CLEAR
CO2 SERPL-SCNC: 23 MMOL/L (ref 21–32)
COLOR UR AUTO: COLORLESS
CREAT BLD-MCNC: 0.97 MG/DL (ref 0.55–1.02)
EGFRCR SERPLBLD CKD-EPI 2021: 76 ML/MIN/1.73M2 (ref 60–?)
GLOBULIN PLAS-MCNC: 2.4 G/DL (ref 2–3.5)
GLUCOSE BLD-MCNC: 151 MG/DL (ref 70–99)
GLUCOSE BLD-MCNC: 31 MG/DL (ref 70–99)
GLUCOSE BLD-MCNC: 437 MG/DL (ref 70–99)
GLUCOSE BLD-MCNC: 548 MG/DL (ref 70–99)
GLUCOSE BLD-MCNC: 78 MG/DL (ref 70–99)
GLUCOSE UR STRIP.AUTO-MCNC: >1000 MG/DL
HCO3 BLDV-SCNC: 26.5 MEQ/L (ref 22–26)
KETONES UR STRIP.AUTO-MCNC: NEGATIVE MG/DL
LEUKOCYTE ESTERASE UR QL STRIP.AUTO: NEGATIVE
NITRITE UR QL STRIP.AUTO: NEGATIVE
OSMOLALITY SERPL CALC.SUM OF ELEC: 297 MOSM/KG (ref 275–295)
OXYHGB MFR BLDV: 69.1 % (ref 72–78)
PCO2 BLDV: 39 MM HG (ref 38–50)
PH BLDV: 7.45 [PH] (ref 7.33–7.43)
PH UR STRIP.AUTO: 6 [PH] (ref 5–8)
PO2 BLDV: 47 MM HG (ref 30–50)
POTASSIUM SERPL-SCNC: 4.7 MMOL/L (ref 3.5–5.1)
PROT SERPL-MCNC: 7.2 G/DL (ref 5.7–8.2)
PROT UR STRIP.AUTO-MCNC: NEGATIVE MG/DL
RBC UR QL AUTO: NEGATIVE
SODIUM SERPL-SCNC: 131 MMOL/L (ref 136–145)
SP GR UR STRIP.AUTO: >1.03 (ref 1–1.03)
UROBILINOGEN UR STRIP.AUTO-MCNC: NORMAL MG/DL

## 2025-05-14 PROCEDURE — 96376 TX/PRO/DX INJ SAME DRUG ADON: CPT

## 2025-05-14 PROCEDURE — 82962 GLUCOSE BLOOD TEST: CPT

## 2025-05-14 PROCEDURE — 96361 HYDRATE IV INFUSION ADD-ON: CPT

## 2025-05-14 PROCEDURE — 81003 URINALYSIS AUTO W/O SCOPE: CPT | Performed by: EMERGENCY MEDICINE

## 2025-05-14 PROCEDURE — 96374 THER/PROPH/DIAG INJ IV PUSH: CPT

## 2025-05-14 PROCEDURE — 96375 TX/PRO/DX INJ NEW DRUG ADDON: CPT

## 2025-05-14 RX ORDER — INSULIN ASPART 100 [IU]/ML
0.2 INJECTION, SOLUTION INTRAVENOUS; SUBCUTANEOUS ONCE
Status: COMPLETED | OUTPATIENT
Start: 2025-05-14 | End: 2025-05-14

## 2025-05-14 RX ORDER — METOCLOPRAMIDE HYDROCHLORIDE 5 MG/ML
10 INJECTION INTRAMUSCULAR; INTRAVENOUS ONCE
Status: DISCONTINUED | OUTPATIENT
Start: 2025-05-14 | End: 2025-05-14

## 2025-05-14 RX ORDER — HYDROMORPHONE HYDROCHLORIDE 1 MG/ML
0.5 INJECTION, SOLUTION INTRAMUSCULAR; INTRAVENOUS; SUBCUTANEOUS EVERY 30 MIN PRN
Refills: 0 | Status: DISCONTINUED | OUTPATIENT
Start: 2025-05-14 | End: 2025-05-14

## 2025-05-14 RX ORDER — NICOTINE POLACRILEX 4 MG
30 LOZENGE BUCCAL
Status: DISCONTINUED | OUTPATIENT
Start: 2025-05-14 | End: 2025-05-14

## 2025-05-14 RX ORDER — DEXTROSE MONOHYDRATE 25 G/50ML
50 INJECTION, SOLUTION INTRAVENOUS ONCE
Status: COMPLETED | OUTPATIENT
Start: 2025-05-14 | End: 2025-05-14

## 2025-05-14 RX ORDER — DROPERIDOL 2.5 MG/ML
1.25 INJECTION, SOLUTION INTRAMUSCULAR; INTRAVENOUS ONCE
Status: COMPLETED | OUTPATIENT
Start: 2025-05-14 | End: 2025-05-14

## 2025-05-14 RX ORDER — DEXTROSE MONOHYDRATE 25 G/50ML
50 INJECTION, SOLUTION INTRAVENOUS
Status: DISCONTINUED | OUTPATIENT
Start: 2025-05-14 | End: 2025-05-14

## 2025-05-14 RX ORDER — NICOTINE POLACRILEX 4 MG
15 LOZENGE BUCCAL
Status: DISCONTINUED | OUTPATIENT
Start: 2025-05-14 | End: 2025-05-14

## 2025-05-14 RX ORDER — INSULIN ASPART 100 [IU]/ML
INJECTION, SOLUTION INTRAVENOUS; SUBCUTANEOUS
Status: DISCONTINUED
Start: 2025-05-14 | End: 2025-05-14

## 2025-05-14 NOTE — ED INITIAL ASSESSMENT (HPI)
Pt states she has Crohn's flare up for 2 days, reports blood in stools, n/v today, frequent urination. She denies dysuria, no hematuria. Blood sugar 400-500's at home. Accucheck 534 in Triage

## 2025-05-14 NOTE — ED PROVIDER NOTES
Patient Seen in: [facility] Emergency Department      History     Chief Complaint   Patient presents with    Hyperglycemia     Stated Complaint: BLOODSUGARS OVER 500 WITH VOMITING    Subjective:   HPI  History of Present Illness            40-year-old female presenting to the emergency department for vomiting and elevated blood sugars.  Patient is a Crohn's patient Sunday she started having some abdominal discomfort again and vomiting she cannot get under control causing her sugars to be elevated prompting her visit here.  She denies any other exacerbating relieving factors or associated symptoms.      Objective:     Past Medical History:    Crohn's disease (HCC)    Hidradenitis suppurativa    Type 1 diabetes mellitus (HCC)              Past Surgical History:   Procedure Laterality Date    I&d deep absc neck/chst+rib cut      Other surgical history  04/02/2025    [facility]. rectal fistula repair    Repair of anal fistula w/fibrin glue                  Social History     Socioeconomic History    Marital status:    Tobacco Use    Smoking status: Never    Smokeless tobacco: Never   Vaping Use    Vaping status: Never Used   Substance and Sexual Activity    Alcohol use: Not Currently    Drug use: Never     Social Drivers of Health     Food Insecurity: No Food Insecurity (4/28/2025)    NCSS - Food Insecurity     Worried About Running Out of Food in the Last Year: No     Ran Out of Food in the Last Year: No   Transportation Needs: No Transportation Needs (4/28/2025)    NCSS - Transportation     Lack of Transportation: No   Housing Stability: Not At Risk (4/28/2025)    NCSS - Housing/Utilities     Has Housing: Yes     Worried About Losing Housing: No     Unable to Get Utilities: No                                Physical Exam     ED Triage Vitals [05/13/25 2242]   /70   Pulse 108   Resp 20   Temp 98.8 °F (37.1 °C)   Temp src Oral   SpO2 99 %   O2 Device None (Room air)       Current Vitals:    Vital Signs  BP: 108/70  Pulse: 91  Resp: 19  Temp: 98.8 °F (37.1 °C)  Temp src: Oral  MAP (mmHg): 86    Oxygen Therapy  SpO2: 98 %  O2 Device: None (Room air)          Physical Exam  Awake alert patient appears no distress HEENT exam normal lungs were clear cardiovascular exam regular rhythm abdomen soft soft no rebound tenderness extremities no carcinosis or edema no rash no focal neurologic deficits        ED Course     Labs Reviewed   COMP METABOLIC PANEL (14) - Abnormal; Notable for the following components:       Result Value    Glucose 548 (*)     Sodium 131 (*)     Calculated Osmolality 297 (*)     Bilirubin, Total 0.2 (*)     All other components within normal limits   VENOUS BLOOD GAS - Abnormal; Notable for the following components:    Venous pH 7.45 (*)     Venous HCO3 26.5 (*)     Venous O2Hb 69.1 (*)     All other components within normal limits   URINALYSIS WITH CULTURE REFLEX - Abnormal; Notable for the following components:    Urine Color Colorless (*)     Spec Gravity >1.030 (*)     Glucose Urine >1000 (*)     All other components within normal limits   POCT GLUCOSE - Abnormal; Notable for the following components:    POC Glucose 534 (*)     All other components within normal limits   POCT GLUCOSE - Abnormal; Notable for the following components:    POC Glucose 557 (*)     All other components within normal limits   POCT GLUCOSE - Abnormal; Notable for the following components:    POC Glucose 437 (*)     All other components within normal limits   POCT GLUCOSE - Abnormal; Notable for the following components:    POC Glucose 31 (*)     All other components within normal limits   POCT GLUCOSE - Abnormal; Notable for the following components:    POC Glucose 151 (*)     All other components within normal limits   POCT GLUCOSE - Normal   CBC WITH DIFFERENTIAL WITH PLATELET   RAINBOW DRAW BLUE   RAINBOW DRAW GOLD       ED Course as of 05/14/25  0437  ------------------------------------------------------------  Time: 05/13 0047  Value: CBC With Differential With Platelet  Comment: Unremarkable     Results            Differential diagnosis includes DKA, obstruction  A total of 35 minutes of critical care time (exclusive of billable procedures) was administered to manage the patient's critical lab values due to her hyperglycemia.  This involved direct patient intervention, complex decision making, and/or extensive discussions with the patient, family, and clinical staff.                  MDM              Medical Decision Making  40-year-old female presenting to the emergency department for vomiting.  IV established cardiac monitor shows a sinus rhythm pulse ox shows no signs of hypoxia sugar level is normal elevated she was treated for pain in the emergency department patient is not in DKA patient was treated with IV fluids and insulin patient has had about brought with her drop in her glucose because she does require some glucose administration she is feeling better at this time pain is controlled she feels comfortable going home she is to return to the emergency department for worsening symptoms or other complaints    Problems Addressed:  Chronic abdominal pain: acute illness or injury  Hyperglycemia: acute illness or injury  Nausea and vomiting, unspecified vomiting type: acute illness or injury    Amount and/or Complexity of Data Reviewed  Labs:  Decision-making details documented in ED Course.  Radiology: ordered and independent interpretation performed. Decision-making details documented in ED Course.  ECG/medicine tests: ordered and independent interpretation performed. Decision-making details documented in ED Course.        Disposition and Plan     Clinical Impression:  1. Hyperglycemia    2. Nausea and vomiting, unspecified vomiting type    3. Chronic abdominal pain         Disposition:  Discharge  5/14/2025  4:37 am    Follow-up:  No follow-up  provider specified.        Medications Prescribed:  Current Discharge Medication List                Supplementary Documentation:

## 2025-05-16 ENCOUNTER — HOSPITAL ENCOUNTER (OUTPATIENT)
Facility: HOSPITAL | Age: 40
Setting detail: OBSERVATION
Discharge: LEFT BEFORE DISCHARGE | End: 2025-05-17
Attending: EMERGENCY MEDICINE | Admitting: STUDENT IN AN ORGANIZED HEALTH CARE EDUCATION/TRAINING PROGRAM
Payer: COMMERCIAL

## 2025-05-16 DIAGNOSIS — R73.9 HYPERGLYCEMIA: Primary | ICD-10-CM

## 2025-05-16 DIAGNOSIS — K50.90 CROHN'S DISEASE WITHOUT COMPLICATION, UNSPECIFIED GASTROINTESTINAL TRACT LOCATION (HCC): ICD-10-CM

## 2025-05-16 LAB
ALBUMIN SERPL-MCNC: 4.1 G/DL (ref 3.2–4.8)
ALBUMIN/GLOB SERPL: 2 {RATIO} (ref 1–2)
ALP LIVER SERPL-CCNC: 101 U/L (ref 37–98)
ALT SERPL-CCNC: 9 U/L (ref 10–49)
ANION GAP SERPL CALC-SCNC: 5 MMOL/L (ref 0–18)
AST SERPL-CCNC: 20 U/L (ref ?–34)
B-HCG UR QL: NEGATIVE
BASE EXCESS BLDV CALC-SCNC: 1.9 MMOL/L
BASOPHILS # BLD AUTO: 0.04 X10(3) UL (ref 0–0.2)
BASOPHILS NFR BLD AUTO: 0.7 %
BILIRUB SERPL-MCNC: <0.2 MG/DL (ref 0.3–1.2)
BUN BLD-MCNC: 10 MG/DL (ref 9–23)
CALCIUM BLD-MCNC: 9 MG/DL (ref 8.7–10.6)
CHLORIDE SERPL-SCNC: 103 MMOL/L (ref 98–112)
CO2 SERPL-SCNC: 24 MMOL/L (ref 21–32)
CREAT BLD-MCNC: 0.87 MG/DL (ref 0.55–1.02)
EGFRCR SERPLBLD CKD-EPI 2021: 86 ML/MIN/1.73M2 (ref 60–?)
EOSINOPHIL # BLD AUTO: 0.03 X10(3) UL (ref 0–0.7)
EOSINOPHIL NFR BLD AUTO: 0.5 %
ERYTHROCYTE [DISTWIDTH] IN BLOOD BY AUTOMATED COUNT: 15 %
GLOBULIN PLAS-MCNC: 2.1 G/DL (ref 2–3.5)
GLUCOSE BLD-MCNC: 610 MG/DL (ref 70–99)
GLUCOSE BLD-MCNC: >600 MG/DL (ref 70–99)
HCO3 BLDV-SCNC: 26.3 MEQ/L (ref 22–26)
HCT VFR BLD AUTO: 32.7 % (ref 35–48)
HGB BLD-MCNC: 11 G/DL (ref 12–16)
IMM GRANULOCYTES # BLD AUTO: 0.02 X10(3) UL (ref 0–1)
IMM GRANULOCYTES NFR BLD: 0.3 %
LYMPHOCYTES # BLD AUTO: 1.63 X10(3) UL (ref 1–4)
LYMPHOCYTES NFR BLD AUTO: 27 %
MCH RBC QN AUTO: 31.3 PG (ref 26–34)
MCHC RBC AUTO-ENTMCNC: 33.6 G/DL (ref 31–37)
MCV RBC AUTO: 92.9 FL (ref 80–100)
MONOCYTES # BLD AUTO: 0.45 X10(3) UL (ref 0.1–1)
MONOCYTES NFR BLD AUTO: 7.5 %
NEUTROPHILS # BLD AUTO: 3.87 X10 (3) UL (ref 1.5–7.7)
NEUTROPHILS # BLD AUTO: 3.87 X10(3) UL (ref 1.5–7.7)
NEUTROPHILS NFR BLD AUTO: 64 %
OSMOLALITY SERPL CALC.SUM OF ELEC: 301 MOSM/KG (ref 275–295)
OXYHGB MFR BLDV: 91.2 % (ref 72–78)
PCO2 BLDV: 36 MM HG (ref 38–50)
PH BLDV: 7.46 [PH] (ref 7.33–7.43)
PLATELET # BLD AUTO: 292 10(3)UL (ref 150–450)
PO2 BLDV: 131 MM HG (ref 30–50)
POTASSIUM SERPL-SCNC: 4.8 MMOL/L (ref 3.5–5.1)
PROT SERPL-MCNC: 6.2 G/DL (ref 5.7–8.2)
RBC # BLD AUTO: 3.52 X10(6)UL (ref 3.8–5.3)
SODIUM SERPL-SCNC: 132 MMOL/L (ref 136–145)
WBC # BLD AUTO: 6 X10(3) UL (ref 4–11)

## 2025-05-16 RX ORDER — INSULIN ASPART 100 [IU]/ML
0.2 INJECTION, SOLUTION INTRAVENOUS; SUBCUTANEOUS ONCE
Status: COMPLETED | OUTPATIENT
Start: 2025-05-16 | End: 2025-05-16

## 2025-05-16 RX ORDER — HYDROMORPHONE HYDROCHLORIDE 1 MG/ML
0.5 INJECTION, SOLUTION INTRAMUSCULAR; INTRAVENOUS; SUBCUTANEOUS EVERY 30 MIN PRN
Refills: 0 | Status: DISCONTINUED | OUTPATIENT
Start: 2025-05-16 | End: 2025-05-16

## 2025-05-16 RX ORDER — HYDROMORPHONE HYDROCHLORIDE 1 MG/ML
1 INJECTION, SOLUTION INTRAMUSCULAR; INTRAVENOUS; SUBCUTANEOUS EVERY 30 MIN PRN
Refills: 0 | Status: COMPLETED | OUTPATIENT
Start: 2025-05-16 | End: 2025-05-17

## 2025-05-17 ENCOUNTER — APPOINTMENT (OUTPATIENT)
Dept: CT IMAGING | Facility: HOSPITAL | Age: 40
End: 2025-05-17
Attending: EMERGENCY MEDICINE
Payer: COMMERCIAL

## 2025-05-17 VITALS
HEART RATE: 75 BPM | BODY MASS INDEX: 23.32 KG/M2 | DIASTOLIC BLOOD PRESSURE: 54 MMHG | RESPIRATION RATE: 20 BRPM | WEIGHT: 140 LBS | OXYGEN SATURATION: 97 % | HEIGHT: 65 IN | TEMPERATURE: 98 F | SYSTOLIC BLOOD PRESSURE: 95 MMHG

## 2025-05-17 LAB
BILIRUB UR QL STRIP.AUTO: NEGATIVE
CLARITY UR REFRACT.AUTO: CLEAR
COLOR UR AUTO: YELLOW
CRP SERPL-MCNC: <0.4 MG/DL (ref ?–0.5)
ERYTHROCYTE [SEDIMENTATION RATE] IN BLOOD: 3 MM/HR (ref 0–20)
GLUCOSE BLD-MCNC: 133 MG/DL (ref 70–99)
GLUCOSE BLD-MCNC: 183 MG/DL (ref 70–99)
GLUCOSE BLD-MCNC: 186 MG/DL (ref 70–99)
GLUCOSE BLD-MCNC: 225 MG/DL (ref 70–99)
GLUCOSE BLD-MCNC: 228 MG/DL (ref 70–99)
GLUCOSE BLD-MCNC: 345 MG/DL (ref 70–99)
GLUCOSE BLD-MCNC: 57 MG/DL (ref 70–99)
GLUCOSE UR STRIP.AUTO-MCNC: 500 MG/DL
HBV CORE AB SERPL QL IA: NONREACTIVE
HBV SURFACE AG SER-ACNC: <0.1 [IU]/L
HBV SURFACE AG SERPL QL IA: NONREACTIVE
KETONES UR STRIP.AUTO-MCNC: NEGATIVE MG/DL
LEUKOCYTE ESTERASE UR QL STRIP.AUTO: NEGATIVE
MAGNESIUM SERPL-MCNC: 1.8 MG/DL (ref 1.6–2.6)
NITRITE UR QL STRIP.AUTO: NEGATIVE
PH UR STRIP.AUTO: 7 [PH] (ref 5–8)
PROT UR STRIP.AUTO-MCNC: NEGATIVE MG/DL
RBC UR QL AUTO: NEGATIVE
SP GR UR STRIP.AUTO: 1.01 (ref 1–1.03)
UROBILINOGEN UR STRIP.AUTO-MCNC: 0.2 MG/DL

## 2025-05-17 PROCEDURE — 99223 1ST HOSP IP/OBS HIGH 75: CPT | Performed by: STUDENT IN AN ORGANIZED HEALTH CARE EDUCATION/TRAINING PROGRAM

## 2025-05-17 PROCEDURE — 74177 CT ABD & PELVIS W/CONTRAST: CPT | Performed by: EMERGENCY MEDICINE

## 2025-05-17 RX ORDER — PROCHLORPERAZINE EDISYLATE 5 MG/ML
5 INJECTION INTRAMUSCULAR; INTRAVENOUS EVERY 8 HOURS PRN
Status: DISCONTINUED | OUTPATIENT
Start: 2025-05-17 | End: 2025-05-17

## 2025-05-17 RX ORDER — NICOTINE POLACRILEX 4 MG
15 LOZENGE BUCCAL
Status: DISCONTINUED | OUTPATIENT
Start: 2025-05-17 | End: 2025-05-17

## 2025-05-17 RX ORDER — BENZONATATE 100 MG/1
200 CAPSULE ORAL 3 TIMES DAILY PRN
Status: DISCONTINUED | OUTPATIENT
Start: 2025-05-17 | End: 2025-05-17

## 2025-05-17 RX ORDER — HYDROMORPHONE HYDROCHLORIDE 1 MG/ML
1 INJECTION, SOLUTION INTRAMUSCULAR; INTRAVENOUS; SUBCUTANEOUS EVERY 4 HOURS PRN
Refills: 0 | Status: DISCONTINUED | OUTPATIENT
Start: 2025-05-17 | End: 2025-05-17

## 2025-05-17 RX ORDER — DEXTROSE MONOHYDRATE 25 G/50ML
50 INJECTION, SOLUTION INTRAVENOUS
Status: DISCONTINUED | OUTPATIENT
Start: 2025-05-17 | End: 2025-05-17

## 2025-05-17 RX ORDER — HYDROMORPHONE HYDROCHLORIDE 1 MG/ML
1 INJECTION, SOLUTION INTRAMUSCULAR; INTRAVENOUS; SUBCUTANEOUS ONCE
Refills: 0 | Status: COMPLETED | OUTPATIENT
Start: 2025-05-17 | End: 2025-05-17

## 2025-05-17 RX ORDER — ACETAMINOPHEN 10 MG/ML
1000 INJECTION, SOLUTION INTRAVENOUS EVERY 6 HOURS PRN
Status: DISCONTINUED | OUTPATIENT
Start: 2025-05-17 | End: 2025-05-17

## 2025-05-17 RX ORDER — INSULIN DEGLUDEC 100 U/ML
10 INJECTION, SOLUTION SUBCUTANEOUS EVERY MORNING
Status: DISCONTINUED | OUTPATIENT
Start: 2025-05-17 | End: 2025-05-17

## 2025-05-17 RX ORDER — ACETAMINOPHEN 325 MG/1
650 TABLET ORAL EVERY 4 HOURS PRN
Status: DISCONTINUED | OUTPATIENT
Start: 2025-05-17 | End: 2025-05-17

## 2025-05-17 RX ORDER — DIBUCAINE 0.28 G/28G
1 OINTMENT TOPICAL EVERY 8 HOURS PRN
Status: DISCONTINUED | OUTPATIENT
Start: 2025-05-17 | End: 2025-05-17

## 2025-05-17 RX ORDER — HYDROCODONE BITARTRATE AND ACETAMINOPHEN 5; 325 MG/1; MG/1
2 TABLET ORAL EVERY 4 HOURS PRN
Status: DISCONTINUED | OUTPATIENT
Start: 2025-05-17 | End: 2025-05-17

## 2025-05-17 RX ORDER — HYDROMORPHONE HYDROCHLORIDE 1 MG/ML
0.5 INJECTION, SOLUTION INTRAMUSCULAR; INTRAVENOUS; SUBCUTANEOUS EVERY 4 HOURS PRN
Refills: 0 | Status: DISCONTINUED | OUTPATIENT
Start: 2025-05-17 | End: 2025-05-17

## 2025-05-17 RX ORDER — ESCITALOPRAM OXALATE 20 MG/1
20 TABLET ORAL DAILY
Status: DISCONTINUED | OUTPATIENT
Start: 2025-05-17 | End: 2025-05-17

## 2025-05-17 RX ORDER — NICOTINE POLACRILEX 4 MG
30 LOZENGE BUCCAL
Status: DISCONTINUED | OUTPATIENT
Start: 2025-05-17 | End: 2025-05-17

## 2025-05-17 RX ORDER — HYDROCORTISONE ACETATE 25 MG/1
25 SUPPOSITORY RECTAL 2 TIMES DAILY PRN
Status: DISCONTINUED | OUTPATIENT
Start: 2025-05-17 | End: 2025-05-17

## 2025-05-17 RX ORDER — ONDANSETRON 2 MG/ML
4 INJECTION INTRAMUSCULAR; INTRAVENOUS EVERY 6 HOURS PRN
Status: DISCONTINUED | OUTPATIENT
Start: 2025-05-17 | End: 2025-05-17

## 2025-05-17 RX ORDER — HYDROCODONE BITARTRATE AND ACETAMINOPHEN 5; 325 MG/1; MG/1
1 TABLET ORAL EVERY 4 HOURS PRN
Status: DISCONTINUED | OUTPATIENT
Start: 2025-05-17 | End: 2025-05-17

## 2025-05-17 RX ORDER — ECHINACEA PURPUREA EXTRACT 125 MG
1 TABLET ORAL
Status: DISCONTINUED | OUTPATIENT
Start: 2025-05-17 | End: 2025-05-17

## 2025-05-17 NOTE — ED PROVIDER NOTES
Patient Seen in: [facility] Emergency Department      History     Chief Complaint   Patient presents with    Hyperglycemia     Stated Complaint: , chrons flare    Subjective:   HPI  History of Present Illness            40-year-old female with Crohn's type 1 diabetes presents ED for evaluation of exacerbation of Crohn's.  States for the past week she is having increasing abdominal pain, frequent bouts of bloody diarrhea.  Nausea and vomiting.  Seen here approximate 3 days ago for hyperglycemia and Crohn's she elected to go home.  At home she is not getting much better.  No fevers..  No other associated symptoms.  No other complaints      Objective:     No pertinent past medical history.            No pertinent past surgical history.              No pertinent social history.                              Physical Exam     ED Triage Vitals [05/16/25 2105]   /69   Pulse 99   Resp 20   Temp 98.8 °F (37.1 °C)   Temp src Oral   SpO2 97 %   O2 Device None (Room air)       Current Vitals:   Vital Signs  BP: 105/71  Pulse: 73  Resp: 17  Temp: 98.8 °F (37.1 °C)  Temp src: Oral  MAP (mmHg): 81    Oxygen Therapy  SpO2: 99 %  O2 Device: None (Room air)          Physical Exam  Vitals and nursing note reviewed.   Constitutional:       General: She is not in acute distress.     Appearance: She is well-developed. She is not toxic-appearing.   HENT:      Head: Normocephalic and atraumatic.   Eyes:      General: No scleral icterus.     Conjunctiva/sclera: Conjunctivae normal.   Cardiovascular:      Rate and Rhythm: Normal rate.   Pulmonary:      Effort: Pulmonary effort is normal. No respiratory distress.   Abdominal:      General: There is no distension.      Tenderness: There is abdominal tenderness. There is no guarding or rebound.   Musculoskeletal:         General: No tenderness. Normal range of motion.      Cervical back: Normal range of motion and neck supple.   Skin:     General: Skin is warm and dry.       Findings: No rash.   Neurological:      Mental Status: She is alert and oriented to person, place, and time.      Motor: No abnormal muscle tone.      Coordination: Coordination normal.   Psychiatric:         Behavior: Behavior normal.                  ED Course     Labs Reviewed   CBC WITH DIFFERENTIAL WITH PLATELET - Abnormal; Notable for the following components:       Result Value    RBC 3.52 (*)     HGB 11.0 (*)     HCT 32.7 (*)     All other components within normal limits   COMP METABOLIC PANEL (14) - Abnormal; Notable for the following components:    Glucose 610 (*)     Sodium 132 (*)     Calculated Osmolality 301 (*)     ALT 9 (*)     Alkaline Phosphatase 101 (*)     Bilirubin, Total <0.2 (*)     All other components within normal limits   VENOUS BLOOD GAS - Abnormal; Notable for the following components:    Venous pH 7.46 (*)     Venous pCO2 36 (*)     Venous pO2 131 (*)     Venous HCO3 26.3 (*)     Venous O2Hb 91.2 (*)     All other components within normal limits   URINALYSIS WITH CULTURE REFLEX - Abnormal; Notable for the following components:    Glucose Urine 500 (*)     All other components within normal limits   POCT GLUCOSE - Abnormal; Notable for the following components:    POC Glucose >600 (*)     All other components within normal limits   POCT GLUCOSE - Abnormal; Notable for the following components:    POC Glucose 345 (*)     All other components within normal limits   POCT PREGNANCY URINE - Normal   RAINBOW DRAW BLUE     EKG    Rate, intervals and axes as noted on EKG Report.  Rate: 93  Rhythm: Sinus Rhythm  Reading: Normal EKG              Results                                 MDM                  -Comorbidities did add complexity to the management are mentioned in the HPI above        -I personally reviewed the prior external notes and the medical record to obtain additional history reviewed last ED visit from 8/13/2025 she presented with exacerbation of Crohn's, hyperglycemia but no DKA.   History of chronic abdominal pain.-        -DDX: Includes but not limited to DKA, exacerbation of Crohn's, bowel obstruction, intestinal perforation which is a medical condition that can pose a threat to life/function             -I personally reviewed the CT findings and it shows constipation  Please refer to radiology report for official interpretation    CT ABDOMEN PELVIS WITH IV CONTRAST    Comparison 5/4/2025      IMPRESSION:  -Moderate amount of stool throughout the large bowel may represent constipation in the appropriate clinical setting.  No large bowel wall thickening.    -Normal appendix and small bowel.  -No evidence of an obstructive uropathy.  -No aortic aneurysm.  -Age-appropriate uterus and ovaries with an intrauterine IUD.    Medications   HYDROmorphone (Dilaudid) 1 MG/ML injection 1 mg (1 mg Intravenous Given 5/17/25 0127)   sodium chloride 0.9 % IV bolus 1,000 mL (1,000 mL Intravenous New Bag 5/16/25 2227)   insulin aspart (NovoLOG) 100 Units/mL vial 13 Units (13 Units Subcutaneous Given 5/16/25 2349)   iopamidol 76% (ISOVUE-370) injection for power injector (80 mL Intravenous Given 5/17/25 0108)     Labs Reviewed   CBC WITH DIFFERENTIAL WITH PLATELET - Abnormal; Notable for the following components:       Result Value    RBC 3.52 (*)     HGB 11.0 (*)     HCT 32.7 (*)     All other components within normal limits   COMP METABOLIC PANEL (14) - Abnormal; Notable for the following components:    Glucose 610 (*)     Sodium 132 (*)     Calculated Osmolality 301 (*)     ALT 9 (*)     Alkaline Phosphatase 101 (*)     Bilirubin, Total <0.2 (*)     All other components within normal limits   VENOUS BLOOD GAS - Abnormal; Notable for the following components:    Venous pH 7.46 (*)     Venous pCO2 36 (*)     Venous pO2 131 (*)     Venous HCO3 26.3 (*)     Venous O2Hb 91.2 (*)     All other components within normal limits   URINALYSIS WITH CULTURE REFLEX - Abnormal; Notable for the following components:     Glucose Urine 500 (*)     All other components within normal limits   POCT GLUCOSE - Abnormal; Notable for the following components:    POC Glucose >600 (*)     All other components within normal limits   POCT GLUCOSE - Abnormal; Notable for the following components:    POC Glucose 345 (*)     All other components within normal limits   POCT PREGNANCY URINE - Normal   RAINBOW DRAW BLUE     Patient has hyperglycemia no DKA.  She was given insulin and fluids with improvement of blood sugar.  CT scan without any abscess perforation or obstruction.  However patient requiring increasing doses of Dilaudid.  Does not feel comfort going home.  She will be admitted for further care discussed with hospital service for admission.         Admission disposition: 5/17/2025  1:47 AM           Medical Decision Making      Disposition and Plan     Clinical Impression:  1. Hyperglycemia    2. Crohn's disease without complication, unspecified gastrointestinal tract location (HCC)         Disposition:  Admit  5/17/2025  1:47 am    Follow-up:  No follow-up provider specified.        Medications Prescribed:  Current Discharge Medication List                Supplementary Documentation:         Hospital Problems       Present on Admission  Date Reviewed: 5/13/2025          ICD-10-CM Noted POA    * (Principal) Hyperglycemia R73.9 3/26/2025 Unknown

## 2025-05-17 NOTE — PLAN OF CARE
Pt received A&Ox4. VSS. Afebrile. C/o severe pain. PRN given with mild relief. Blood sugar 56 this AM. Corrected per protocol. MD notified. Pt notified PCT that she wanted to go home. This RN talked to pt about the importance of staying. Agreeable to sign out AMA. Paperwork signed and put in chart. MD notified. PIV removed.

## 2025-05-17 NOTE — CONSULTS
[facility]                       [facility]    Espreanza Mauro Patient Status:  Observation    1985 MRN [id number]   Location [facility] 4NW-A Attending Lilian Arenas,    Hosp Day # 0 PCP None Pcp         Reason for consultation: diarrhea, abdominal pain, vomiting  HPI: This is a 40 yr-old male with DM and Crohn's disease of the colon. She was diagnosed 8 years ago and was seeing a physician at [facility] who prescribed Remicade 7.5 mg/every 12 weeks--last infusion end of March. She has a history of perianal disease requiring seton placement (reports 70+ surgeries at [facility]) who underwent anorectal exam under anesthesia 4/3 at [facility] for chronic anorectal pain which revealed left posterolateral intersphincteric fistula s/p seton placement of tract + perianal + pudendal block was administered for pain control--Of note, pt had concern at the time for possible vaginal fistula and no additional tracts noted in the anorectum--who has since required admissions to [facility] for persistent rectal pain s/p anal exam under anesthesia with completion fistulotomy on 2025 by Dr. Gibson--of note no vaginal fistula noted on exam.  Patient denies worsening rectal pain or discharge.   Patient returned to ER yesterday with diarrhea, LLQ abdominal pain, and vomiting.  Patient with chronic LLQ pain/rectal pain. She has not been taking narcotic pain medications at home. She reports worsening symptoms since  which progressed to nausea with nonbloody vomiting. She normally has a BM every other day.   Most recent flexible sigmoidoscopy 2024; Dr. Diaz revealed normal left colon.  She notes diarrhea which started on  and she has 3 loose stools per day. She has some blood in her stools but notes hemorrhoids. Her sugars were elevated. She denies NSAIDs. Her abdominal pain improves after she has a BM. She last  vomited overnight. She denies recent fevers.   PMHx: Past Medical History[1]  PSHx: Past Surgical History[2]  Medications: Current Medications[3]  Allergies: Allergies[4]  SocHx:  quit smoking;  The patient drinks alcohol on social occasions; The patient has no history of IV drug use or other illicit substances.  FamHx: Mother with IBD in addition to several other family members  ROS:  In addition to the pertinent positives described above:            Infectious Disease:  No chronic infections or recent fevers prior to the acute illness            Cardiovascular: No history of CAD, prior MI, chest pain, or palpitations            Respiratory: No shortness of breath, asthma, copd, recurrent pneumonia            Hematologic: The patient reports no easy bruising, frequent gum bleeding or nose bleeding;  The patient has no history of known chronic anemia            Dermatologic: The patient reports no recent rashes or chronic skin disorders            Rheumatologic: The patient reports no history of chronic arthritis, myalgias, arthralgias            Genitourinary:  The patient reports no history of recurrent urinary tract infections, hematuria, dysuria, or nephrolithiasis           Psychiatric: +anxiety           Oncologic: The patient reports on history of prior solid tumor or hematologic malignancy           ENT: The patient reports no hoarseness of voice, hearing loss, sinus congestion, tinnitus           Neurologic: The patient reports no history of seizure, stroke, or frequent headaches    PE: BP 97/54   Pulse 75   Temp 98.6 °F (37 °C) (Oral)   Resp 19   Ht 5' 5\" (1.651 m)   Wt 140 lb (63.5 kg)   LMP 10/01/2024   SpO2 99%   BMI 23.30 kg/m²   Gen: AAO x 3, able to speak in complete sentences  HENT: NCAT, EOMI, PERRL, oropharynx is clear with moist mucosal membranes  Eyes: Sclerae are anicteric  Neck:  Supple without nuchal rigidity; No lymphadenopathy  CV: Regular rate   Resp: No increased respiratory  effort  Abdomen: Soft, non-tender, non-distended; no rebound or guarding; No ascites is clinically apparent; no tympany to percussion  Ext: No peripheral edema or cyanosis  Skin: Warm and dry  Psychiatric: Appropriate mood and congruent affect without obvious depression or anxiety  Labs:   Lab Results   Component Value Date    WBC 6.0 05/16/2025    HGB 11.0 05/16/2025    HCT 32.7 05/16/2025    .0 05/16/2025    CREATSERUM 0.87 05/16/2025    BUN 10 05/16/2025     05/16/2025    K 4.8 05/16/2025     05/16/2025    CO2 24.0 05/16/2025     05/16/2025    CA 9.0 05/16/2025    ALB 4.1 05/16/2025    ALKPHO 101 05/16/2025    BILT <0.2 05/16/2025    AST 20 05/16/2025    ALT 9 05/16/2025     Recent Labs   Lab 05/13/25  2324 05/16/25  2258   * 610*   BUN 12 10   CREATSERUM 0.97 0.87   CA 9.9 9.0   * 132*   K 4.7 4.8   CL 99 103   CO2 23.0 24.0     Recent Labs   Lab 05/16/25  2258   RBC 3.52*   HGB 11.0*   HCT 32.7*   MCV 92.9   MCH 31.3   MCHC 33.6   RDW 15.0   NEPRELIM 3.87   WBC 6.0   .0       Recent Labs   Lab 05/13/25  2324 05/16/25  2258   ALT 11 9*   AST 30 20       Imaging:   CT Abd: pending  Impression: 40-year-old female with Hx of uncontrolled DM and Crohn's disease of the colon who reports Remicade every 12 weeks and longstanding history of fistulas requiring seton placement who presents with worsening left sided abdominal pain, vomiting and diarrhea. Pt with intermediate Infliximab antibody and low level on 4/27/2025.         Recommendations:      CLD today and NPO after MN, plan for EGD and colonoscopy tomorrow as long as blood sugars are better controlled and stool studies are negative for infection  Await stool study results  Pain management per hospitalist recommendations limiting narcotics as able  Avoid steroids given uncontrolled blood sugars  Fecal calprotectin, C diff, stool PCR pending  Recommend ongoing follow-up with [facility]     The potentially  life-threatening complications of infection, sedation, bleeding, and perforation were reviewed along with the possible need for surgical management, transfusions, or repeat endoscopy should one of these complications arise. She understands and is agreeable.         Thank you for the consultation, we will follow the patient with you.    Belgica Garcia   7:35 AM  5/17/2025  [facility]  [phone]             [1]   Past Medical History:   Chronic abdominal pain    Crohn's disease (HCC)    Drug-seeking behavior    Hidradenitis suppurativa    Type 1 diabetes mellitus (HCC)   [2]   Past Surgical History:  Procedure Laterality Date    I&d deep absc neck/chst+rib cut      Other surgical history  04/02/2025    [facility]. rectal fistula repair    Repair of anal fistula w/fibrin glue     [3]    [COMPLETED] iopamidol 76% (ISOVUE-370) injection for power injector  80 mL Intravenous ONCE PRN    glucose (Dex4) 15 GM/59ML oral liquid 15 g  15 g Oral Q15 Min PRN    Or    glucose (Glutose) 40% oral gel 15 g  15 g Oral Q15 Min PRN    Or    glucose-vitamin C (Dex-4) chewable tab 4 tablet  4 tablet Oral Q15 Min PRN    Or    dextrose 50% injection 50 mL  50 mL Intravenous Q15 Min PRN    Or    glucose (Dex4) 15 GM/59ML oral liquid 30 g  30 g Oral Q15 Min PRN    Or    glucose (Glutose) 40% oral gel 30 g  30 g Oral Q15 Min PRN    Or    glucose-vitamin C (Dex-4) chewable tab 8 tablet  8 tablet Oral Q15 Min PRN    HYDROcodone-acetaminophen (Norco) 5-325 MG per tab 1 tablet  1 tablet Oral Q4H PRN    Or    HYDROcodone-acetaminophen (Norco) 5-325 MG per tab 2 tablet  2 tablet Oral Q4H PRN    melatonin tab 3 mg  3 mg Oral Nightly PRN    ondansetron (Zofran) 4 MG/2ML injection 4 mg  4 mg Intravenous Q6H PRN    prochlorperazine (Compazine) 10 MG/2ML injection 5 mg  5 mg Intravenous Q8H PRN    insulin aspart (NovoLOG) 100 Units/mL FlexPen 2-10 Units  2-10 Units Subcutaneous q6h    benzonatate (Tessalon) cap 200 mg  200 mg  Oral TID PRN    glycerin-hypromellose- (Artificial Tears) 0.2-0.2-1 % ophthalmic solution 1 drop  1 drop Both Eyes QID PRN    sodium chloride (Saline Mist) 0.65 % nasal solution 1 spray  1 spray Each Nare Q3H PRN    acetaminophen (Ofirmev) 10 mg/mL infusion premix 1,000 mg  1,000 mg Intravenous Q6H PRN    HYDROmorphone (Dilaudid) 1 MG/ML injection 0.5 mg  0.5 mg Intravenous Q4H PRN    dibucaine (Nupercainal) 1 % ointment 1 Application  1 Application Topical Q8H PRN    escitalopram (Lexapro) tab 20 mg  20 mg Oral Daily    hydrocortisone (Anusol-HC) 25 MG rectal suppository 25 mg  25 mg Rectal BID PRN    insulin degludec (Tresiba) 100 units/mL flextouch 10 Units  10 Units Subcutaneous QAM    [COMPLETED] sodium chloride 0.9 % IV bolus 1,000 mL  1,000 mL Intravenous Once    [COMPLETED] HYDROmorphone (Dilaudid) 1 MG/ML injection 1 mg  1 mg Intravenous Q30 Min PRN    [COMPLETED] insulin aspart (NovoLOG) 100 Units/mL vial 13 Units  0.2 Units/kg Subcutaneous Once   [4]   Allergies  Allergen Reactions    Vancomycin ANAPHYLAXIS    Morphine RASH    Toradol [Ketorolac Tromethamine] RASH

## 2025-05-17 NOTE — PROGRESS NOTES
NURSING ADMISSION NOTE      Patient admitted via Cart from ER,  alert and oriented x 4, c/o still  with severe left sided abd pain, offered norco as ordered, patient declined and stated she need IV dilaudid 1 mg every 1 hour. Hospitalist came to see the patient. Pain regimen ordered and pt updated, wants IV dilaudid first before taking IV tylenol. Clear liquid diet, blood sugar taken, updated to the rest of plan of care. Need stool for cdiff and stool panel.   Oriented to room.  Safety precautions initiated.  Bed in low position.  Call light in reach.  0653: message sent to GI for consult.

## 2025-05-17 NOTE — ED QUICK NOTES
Rounding completed. Patient's vitals updated, as per documentation. Primary RN updated with vitals and care provided. Patient requesting pain medication. RN notified. Call light within reach.

## 2025-05-17 NOTE — PROGRESS NOTES
Patient seen and examined    Reports a lot of pain requesting increased doses of pain meds  Plan for EGD and C-scope tomorrow  BS low this morning- treated    Katina Ambrosio M.D.  [facility]

## 2025-05-17 NOTE — ED QUICK NOTES
Rounding Completed    Plan of Care reviewed. Waiting for disposition  Elimination needs assessed.  Patient is ambulatory to restroom    Bed is locked and in lowest position. Call light within reach.

## 2025-05-17 NOTE — ED QUICK NOTES
Rounding Completed    Plan of Care reviewed. Waiting for admit  Elimination needs assessed.  Provided updates, warm blanket    Bed is locked and in lowest position. Call light within reach.

## 2025-05-17 NOTE — H&P
[facility]     Esperanza Mauro Patient Status:  Emergency    1985 MRN [id number]   Location [facility] EMERGENCY DEPARTMENT Attending Prem Norris MD   Hosp Day # 0 PCP None Pcp     Chief Complaint: emesis, bleeding rectal fistulas    Subjective:    History of Present Illness:     Esperanza Mauro is a 40 year old female with PMHx Crohns ds/ DM/ anal fistulas who presented to the hospital for rectal pain. Her pain has been present for the past few days and is a sharp pain in her rectum rated 10/10 with no alleviating or exacerbating factors. She notes associated 3-4 episodes of bright red bloody diarrhea daily. She had been trying to use steroid enemas with no relief. She has nausea without emesis and poor PO intake. She denied any fever or chills.    She has multiple recent admissions and ED visits over the past 30 days. She was admitted - for acte on chronic abdominal pain with hematochezia and was seen by GI. There was no sign of Chron's flare and she was recommended FU with her GI at [facility]. She has been seen at immediate care or the ER on , 5/1 x2, 5/4 x2, 513 for similar symptoms. Of note, she was resting comfortably in bed and asked about her pain regimen stating she typically gets 1-2 mg IV dilaudid every 3 hours and was upset that her regimen would not be the same.    History/Other:    Past Medical History:  Past Medical History[1]  Past Surgical History:   Past Surgical History[2]   Family History:   Family History[3]  Social History:    reports that she has never smoked. She has never used smokeless tobacco. She reports that she does not currently use alcohol. She reports that she does not use drugs.     Allergies: Allergies[4]    Medications:  Medications Ordered Prior to Encounter[5]    Review of Systems:   A comprehensive review of systems was completed.    Pertinent positives and negatives noted in the HPI.    Objective:   Physical  Exam:    /71   Pulse 73   Temp 98.8 °F (37.1 °C) (Oral)   Resp 17   Ht 5' 5\" (1.651 m)   Wt 140 lb (63.5 kg)   LMP 10/01/2024   SpO2 99%   BMI 23.30 kg/m²   General: No acute distress, Alert  Respiratory: No rhonchi, no wheezes  Cardiovascular: S1, S2. Regular rate and rhythm  Abdomen: Soft, Non-tender, non-distended, positive bowel sounds  Neuro: No new focal deficits  Extremities: No edema      Results:    Labs:      Labs Last 24 Hours:    Recent Labs   Lab 05/13/25 2324 05/16/25  2258   RBC 3.94 3.52*   HGB 12.2 11.0*   HCT 36.3 32.7*   MCV 92.1 92.9   MCH 31.0 31.3   MCHC 33.6 33.6   RDW 15.1 15.0   NEPRELIM 5.53 3.87   WBC 7.3 6.0   .0 292.0       Recent Labs   Lab 05/13/25 2324 05/16/25  2258   * 610*   BUN 12 10   CREATSERUM 0.97 0.87   EGFRCR 76 86   CA 9.9 9.0   ALB 4.8 4.1   * 132*   K 4.7 4.8   CL 99 103   CO2 23.0 24.0   ALKPHO 90 101*   AST 30 20   ALT 11 9*   BILT 0.2* <0.2*   TP 7.2 6.2       Estimated Glomerular Filtration Rate: 86 mL/min/1.73m2 (result from lab).    No results found for: \"PT\", \"INR\"    No results for input(s): \"TROP\", \"TROPHS\", \"CK\" in the last 168 hours.    No results for input(s): \"TROP\", \"PBNP\" in the last 168 hours.    No results for input(s): \"PCT\" in the last 168 hours.    Imaging: Imaging data reviewed in Epic.    Assessment & Plan:      #BRBPR with anemia of acute blood loss  #hx rectal fistula s/p fistulotomy  #hx Crohn's ds  #chronic abdominal pain  -hgb 11.0: baseline 12.2  -CT showing moderate stool in colon concerning for constipation  -clear liquid diet  -send GI panel, C Diff  -zofran prn  -check ESR/ CRP for possible crohn's flare although less likely with normal CT  -pain control: tylenol, norco  -CBC q6H with tranfsuion for hgb <7  -cont home rectal lidocaine, hydrocortisone suppositories  -GI c/s    #DM with hyperglycemia  -glucose 610  -SSI, QID checks, hypoglycemia protocol  -cont home tresiba at reduced  dose    #depression  -cont home escitalopram      Plan of care discussed with ED physician    Lilian Arenas DO    Supplementary Documentation:     The 21st Century Cures Act makes medical notes like these available to patients in the interest of transparency. Please be advised this is a medical document. Medical documents are intended to carry relevant information, facts as evident, and the clinical opinion of the practitioner. The medical note is intended as peer to peer communication and may appear blunt or direct. It is written in medical language and may contain abbreviations or verbiage that are unfamiliar.                                       [1]   Past Medical History:   Chronic abdominal pain    Crohn's disease (HCC)    Drug-seeking behavior    Hidradenitis suppurativa    Type 1 diabetes mellitus (HCC)   [2]   Past Surgical History:  Procedure Laterality Date    I&d deep absc neck/chst+rib cut      Other surgical history  04/02/2025    [facility]. rectal fistula repair    Repair of anal fistula w/fibrin glue     [3] No family history on file.  [4]   Allergies  Allergen Reactions    Vancomycin ANAPHYLAXIS    Morphine RASH    Toradol [Ketorolac Tromethamine] RASH   [5]   Current Facility-Administered Medications on File Prior to Encounter   Medication Dose Route Frequency Provider Last Rate Last Admin    [COMPLETED] sodium chloride 0.9 % IV bolus 1,000 mL  1,000 mL Intravenous Once Brent Chatterjee MD   Stopped at 05/14/25 0331    [COMPLETED] insulin aspart (NovoLOG) 100 Units/mL vial 13 Units  0.2 Units/kg Subcutaneous Once Brent Chatterjee MD   13 Units at 05/14/25 0031    [COMPLETED] droperidol (Inapsine) 2.5 mg/mL injection 1.25 mg  1.25 mg Intravenous Once Brent Chatterjee MD   1.25 mg at 05/14/25 0035    [COMPLETED] insulin aspart (NovoLOG) 100 Units/mL vial 13 Units  0.2 Units/kg Subcutaneous Once Brent Chatterjee MD   13 Units at 05/14/25 0206    [COMPLETED] dextrose 50% injection 50 mL   50 mL Intravenous Once Brent Chatterjee MD   50 mL at 05/14/25 0410    [COMPLETED] sodium chloride 0.9 % IV bolus 1,000 mL  1,000 mL Intravenous Once Brent Chatterjee MD   Stopped at 05/14/25 0152    [COMPLETED] diphenhydrAMINE (Benadryl) 50 mg/mL  injection 25 mg  25 mg Intravenous Once Brent Locke MD   25 mg at 05/04/25 0120    [COMPLETED] HYDROmorphone (Dilaudid) 1 MG/ML injection 1 mg  1 mg Intravenous Once Brent Locke MD   1 mg at 05/04/25 0120    [COMPLETED] iopamidol 76% (ISOVUE-370) injection for power injector  80 mL Intravenous ONCE PRN Brent Locke MD   80 mL at 05/04/25 0144    [COMPLETED] insulin aspart (NovoLOG) 100 Units/mL vial 10 Units  0.15 Units/kg Subcutaneous Once Brent Locke MD   10 Units at 05/04/25 0224    [COMPLETED] sodium chloride 0.9 % IV bolus 1,000 mL  1,000 mL Intravenous Once Brent Locke MD   Stopped at 05/04/25 0249    [COMPLETED] sodium chloride 0.9 % IV bolus 1,000 mL  1,000 mL Intravenous Once Sabiha Curiel MD   Stopped at 04/30/25 2310    [COMPLETED] HYDROmorphone (Dilaudid) 1 MG/ML injection 1 mg  1 mg Intravenous Once Sabiha Curiel MD   1 mg at 04/30/25 2210    [COMPLETED] HYDROmorphone (Dilaudid) 1 MG/ML injection 1 mg  1 mg Intravenous Once Sabiha Curiel MD   1 mg at 04/30/25 2306    [COMPLETED] iopamidol 76% (ISOVUE-370) injection for power injector  80 mL Intravenous ONCE PRN Sabiha Curiel MD   80 mL at 04/30/25 2320    [COMPLETED] insulin aspart (NovoLOG) 100 Units/mL vial 13 Units  0.2 Units/kg Subcutaneous Once Sabiha Curiel MD   13 Units at 05/01/25 0010    [COMPLETED] HYDROmorphone (Dilaudid) 1 MG/ML injection 1 mg  1 mg Intravenous Once Brandt Garcia MD   1 mg at 04/28/25 0101    [COMPLETED] insulin aspart (NovoLOG) 100 Units/mL vial 13 Units  0.2 Units/kg Subcutaneous Once Brandt Garcia MD   13 Units at 04/27/25 2340    [COMPLETED] sodium chloride 0.9 % IV bolus 1,000 mL  1,000 mL Intravenous Once Brandt Garcia MD   Stopped at  04/28/25 0037    [COMPLETED] ondansetron (Zofran) 4 MG/2ML injection 4 mg  4 mg Intravenous Once Brandt Garcia MD   4 mg at 04/27/25 2336    [COMPLETED] iopamidol 76% (ISOVUE-370) injection for power injector  80 mL Intravenous ONCE PRN Brandt Garcia MD   80 mL at 04/27/25 2348    [COMPLETED] HYDROmorphone (Dilaudid) 1 MG/ML injection 2 mg  2 mg Intravenous Once Sarah Beth Diana MD   2 mg at 04/24/25 1216    [COMPLETED] HYDROmorphone (Dilaudid) 1 MG/ML injection 0.5 mg  0.5 mg Intravenous Once Brent Chatterjee MD   0.5 mg at 04/23/25 0028    [COMPLETED] insulin aspart (NovoLOG) 100 Units/mL vial 13 Units  0.2 Units/kg Subcutaneous Once Brent Chatterjee MD   13 Units at 04/23/25 0056    [COMPLETED] iopamidol 76% (ISOVUE-370) injection for power injector  80 mL Intravenous ONCE PRN Brent Chatterjee MD   80 mL at 04/23/25 0132    [COMPLETED] HYDROmorphone (Dilaudid) 1 MG/ML injection 1 mg  1 mg Intravenous Once Brent Chatterjee MD   1 mg at 04/23/25 0210    [COMPLETED] insulin aspart (NovoLOG) 100 Units/mL vial 13 Units  0.2 Units/kg Subcutaneous Once Brent Chatterjee MD   13 Units at 04/23/25 0249    [COMPLETED] piperacillin-tazobactam (Zosyn) 4.5 g in dextrose 5% 100 mL IVPB-ADDV  4.5 g Intravenous Once Brent Chatterjee MD   Stopped at 04/23/25 0320    [COMPLETED] sodium chloride 0.9 % IV bolus 1,000 mL  1,000 mL Intravenous Once Brent Chatterjee MD   Stopped at 04/23/25 0155    [COMPLETED] HYDROmorphone (Dilaudid) 1 MG/ML injection 0.5 mg  0.5 mg Intravenous Q30 Min PRN Brent Chatterjee MD   0.5 mg at 04/23/25 1028    [COMPLETED] ondansetron (Zofran) 4 MG/2ML injection 4 mg  4 mg Intravenous Once Brent Chatterjee MD   4 mg at 04/23/25 0021    [COMPLETED] HYDROmorphone (Dilaudid) 1 MG/ML injection 2 mg  2 mg Intravenous Once Wilson Narayan DO   2 mg at 04/12/25 1235    [COMPLETED] meperidine (Demerol) 25 MG/ML injection 12.5 mg  12.5 mg Intravenous PRN Areli Hernandez CRNA   12.5 mg at 04/11/25 1221     [COMPLETED] glucose-vitamin C (Dex-4) 4-6 g-mg chewable tab        Given at 04/09/25 0035    [COMPLETED] HYDROmorphone (Dilaudid) 1 MG/ML injection 1 mg  1 mg Intravenous Once Katina Ambrosio MD   1 mg at 04/09/25 0145    [COMPLETED] sodium chloride 0.9 % IV bolus 1,000 mL  1,000 mL Intravenous Once Rubina Powell MD   Stopped at 04/08/25 2210    [COMPLETED] HYDROmorphone (Dilaudid) 1 MG/ML injection 1 mg  1 mg Intravenous Once Rubina Powell MD   1 mg at 04/08/25 2110    [COMPLETED] insulin aspart (NovoLOG) 100 Units/mL vial 13 Units  0.2 Units/kg Subcutaneous Once Rubina Powell MD   13 Units at 04/08/25 2118    [COMPLETED] iopamidol 76% (ISOVUE-370) injection for power injector  75 mL Intravenous ONCE PRN Rubina Powell MD   75 mL at 04/08/25 2206    [COMPLETED] HYDROmorphone (Dilaudid) 1 MG/ML injection 1 mg  1 mg Intravenous Once Rubina Powell MD   1 mg at 04/08/25 2141    [COMPLETED] HYDROmorphone (Dilaudid) 1 MG/ML injection 1 mg  1 mg Intravenous Once Rubina Powell MD   1 mg at 04/08/25 2256    [COMPLETED] metroNIDAZOLE in sodium chloride 0.79% (Flagyl) 5 mg/mL IVPB premix 500 mg  500 mg Intravenous Once Rubina Powell  mL/hr at 04/09/25 0003 500 mg at 04/09/25 0003    [COMPLETED] glucose (Glutose) 40% oral gel        Given at 04/08/25 2350    [COMPLETED] sodium ferric gluconate (Ferrlecit) 125 mg in sodium chloride 0.9% 100mL IVPB premix  125 mg Intravenous Once Belgica Garcia  mL/hr at 03/27/25 1132 125 mg at 03/27/25 1132    [COMPLETED] sodium chloride 0.9 % IV bolus 1,000 mL  1,000 mL Intravenous Once Brandt Garcia MD   Stopped at 03/26/25 0524    [COMPLETED] insulin aspart (NovoLOG) 100 Units/mL vial 14 Units  0.2 Units/kg Subcutaneous Once Brandt Garcia MD   14 Units at 03/26/25 0335    [COMPLETED] ondansetron (Zofran) 4 MG/2ML injection 4 mg  4 mg Intravenous Once Brandt Garcia MD   4 mg at 03/26/25 0338    [COMPLETED] HYDROmorphone (Dilaudid) 1 MG/ML injection 1  mg  1 mg Intravenous Once Brandt Garcia MD   1 mg at 25 0327    [COMPLETED] iopamidol 76% (ISOVUE-370) injection for power injector  80 mL Intravenous ONCE PRN Brandt Garcia MD   80 mL at 25 0413     Current Outpatient Medications on File Prior to Encounter   Medication Sig Dispense Refill    polyethylene glycol, PEG 3350, 17 g Oral Powd Pack Take 17 g by mouth daily for 14 days, THEN 17 g daily as needed. 28 each 0    dibucaine 1 % External Ointment Apply 1 Application topically every 8 (eight) hours as needed. 56 g 1    hydrocortisone 25 MG Rectal Suppos Place 1 suppository (25 mg total) rectally daily. 14 suppository 2    Naloxone HCl 4 MG/0.1ML Nasal Liquid 4 mg by Nasal route as needed. If patient remains unresponsive, repeat dose in other nostril 2-5 minutes after first dose. 1 kit 0    polyethylene glycol, PEG 3350, 17 g Oral Powd Pack Take 17 g by mouth daily. 510 g 2    escitalopram 20 MG Oral Tab Take 1 tablet (20 mg total) by mouth in the morning.      Amphetamine-Dextroamphet ER 20 MG Oral Capsule SR 24 Hr Take 1 capsule (20 mg total) by mouth every morning.      amphetamine-dextroamphetamine 10 MG Oral Tab Take 1 tablet (10 mg total) by mouth in the morning.      TRESIBA FLEXTOUCH 100 UNIT/ML Subcutaneous Solution Pen-injector Inject 40 Units into the skin daily. Prime 2 units before each dose      [] lactulose 20 GM/30ML Oral Solution Take 30 mL (20 g total) by mouth 2 (two) times daily for 3 days. 180 mL 0    [] magnesium citrate 1.745 GM/30ML Oral Solution Take 296 mL by mouth once for 1 dose. 296 mL 0    HYDROmorphone 8 MG Oral Tab Take 1 tablet (8 mg total) by mouth every 4 (four) hours as needed for Pain.      HYDROcodone-acetaminophen  MG Oral Tab Take 1 tablet by mouth every 6 (six) hours as needed for Pain.      inFLIXimab (REMICADE IV)       HUMALOG KWIKPEN 100 UNIT/ML Subcutaneous Solution Pen-injector Inject 8-14 Units into the skin 3 (three) times  daily with meals. Plus sliding scale. Carb ratio 1-6 grams. Correction 1-30. Total 50 units daily. (Patient taking differently: Inject 3-6 Units into the skin in the morning and 3-6 Units at noon and 3-6 Units in the evening. Inject with meals. Per sliding scale .)

## 2025-05-17 NOTE — ED QUICK NOTES
Orders for admission, patient is aware of plan and ready to go upstairs. Any questions, please call ED RN Teto, RN at extension [phone]    Patient Covid vaccination status: Unvaccinated     COVID Test Ordered in ED: None    COVID Suspicion at Admission: N/A    Running Infusions: Medication Infusions[1]     Mental Status/LOC at time of transport: A&Ox3    Other pertinent information:   CIWA score: N/A   NIH score:  N/A             [1]

## 2025-05-17 NOTE — ED INITIAL ASSESSMENT (HPI)
Patient to ED with c/o crohns flare up. States her bs is over 600. Has been vomiting all day. 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  Seen here once this week already

## 2025-05-18 LAB
ATRIAL RATE: 93 BPM
P AXIS: 38 DEGREES
P-R INTERVAL: 120 MS
Q-T INTERVAL: 374 MS
QRS DURATION: 72 MS
QTC CALCULATION (BEZET): 465 MS
R AXIS: 70 DEGREES
T AXIS: 64 DEGREES
VENTRICULAR RATE: 93 BPM

## 2025-05-20 ENCOUNTER — PATIENT OUTREACH (OUTPATIENT)
Dept: CASE MANAGEMENT | Age: 40
End: 2025-05-20

## 2025-05-20 LAB
M TB IFN-G CD4+ T-CELLS BLD-ACNC: 0.09 IU/ML
M TB TUBERC IFN-G BLD QL: NEGATIVE
M TB TUBERC IGNF/MITOGEN IGNF CONTROL: >10 IU/ML
QFT TB1 AG MINUS NIL: 0.02 IU/ML
QFT TB2 AG MINUS NIL: 0.04 IU/ML

## 2025-05-20 NOTE — PROGRESS NOTES
Hospital follow-up appt / Discharged 5/17 Ed[facility] Hosp        DM Request :  Last A1C Value: 9.5% Date: [4/27/2025]     [facility]  [address] [place] [id number] [phone]  Schedule an appointment as soon as possible for a visit      PCP Request :  No pcp on file          Attempt #1:  Left message on voicemail for patient to call transitions specialist back to schedule follow up appointments. Provided Transitions specialist scheduling phone number [phone].

## 2025-05-21 NOTE — PROGRESS NOTES
Hospital follow up.    Last A1C Value: 9.5% Date: [4/27/2025]    Dr. Kerry Chávez MD  Family Medicine  [address] [facility] [address]  [place] [address]  [phone]    Attempt #2:  Left message on voicemail for patient to call transitions specialist back to schedule follow up appointments. Provided Transitions specialist scheduling phone number [phone].

## 2025-05-22 NOTE — PROGRESS NOTES
DM appointment request (Left Against Medical Advice AMA 05/17)    Last A1C Value: 9.5% Date: [4/27/2025]    Dr. Kerry Chávez MD  Family Medicine  [address] [facility] [address]  [place] [address]  [phone]  Multiple attempts w/no calls back; no appointment made    Attempt #3:  Left message on voicemail for patient to call transitions specialist back to schedule follow up appointments. Provided Transitions specialist scheduling phone number [phone]. Closing encounter. Will re-open if patient returns call.

## 2025-05-25 ENCOUNTER — HOSPITAL ENCOUNTER (EMERGENCY)
Facility: HOSPITAL | Age: 40
Discharge: HOME | End: 2025-05-25
Attending: EMERGENCY MEDICINE
Payer: COMMERCIAL

## 2025-05-25 VITALS
SYSTOLIC BLOOD PRESSURE: 103 MMHG | RESPIRATION RATE: 13 BRPM | WEIGHT: 140 LBS | DIASTOLIC BLOOD PRESSURE: 69 MMHG | HEART RATE: 77 BPM | TEMPERATURE: 99 F | BODY MASS INDEX: 23.32 KG/M2 | OXYGEN SATURATION: 100 % | HEIGHT: 65 IN

## 2025-05-25 DIAGNOSIS — R73.9 HYPERGLYCEMIA: Primary | ICD-10-CM

## 2025-05-25 DIAGNOSIS — Z87.19 HISTORY OF CROHN'S DISEASE: ICD-10-CM

## 2025-05-25 DIAGNOSIS — E87.1 HYPONATREMIA: ICD-10-CM

## 2025-05-25 LAB
ALBUMIN SERPL-MCNC: 4.8 G/DL (ref 3.2–4.8)
ALBUMIN/GLOB SERPL: 2.1 {RATIO} (ref 1–2)
ALP LIVER SERPL-CCNC: 111 U/L (ref 37–98)
ALT SERPL-CCNC: 13 U/L (ref 10–49)
ANION GAP SERPL CALC-SCNC: 3 MMOL/L (ref 0–18)
AST SERPL-CCNC: 17 U/L (ref ?–34)
BASOPHILS # BLD AUTO: 0.02 X10(3) UL (ref 0–0.2)
BASOPHILS NFR BLD AUTO: 0.6 %
BILIRUB SERPL-MCNC: 0.2 MG/DL (ref 0.3–1.2)
BUN BLD-MCNC: 8 MG/DL (ref 9–23)
CALCIUM BLD-MCNC: 9.7 MG/DL (ref 8.7–10.6)
CHLORIDE SERPL-SCNC: 104 MMOL/L (ref 98–112)
CO2 SERPL-SCNC: 24 MMOL/L (ref 21–32)
CREAT BLD-MCNC: 0.89 MG/DL (ref 0.55–1.02)
EGFRCR SERPLBLD CKD-EPI 2021: 84 ML/MIN/1.73M2 (ref 60–?)
EOSINOPHIL # BLD AUTO: 0.09 X10(3) UL (ref 0–0.7)
EOSINOPHIL NFR BLD AUTO: 2.5 %
ERYTHROCYTE [DISTWIDTH] IN BLOOD BY AUTOMATED COUNT: 15.5 %
GLOBULIN PLAS-MCNC: 2.3 G/DL (ref 2–3.5)
GLUCOSE BLD-MCNC: 584 MG/DL (ref 70–99)
GLUCOSE BLD-MCNC: 588 MG/DL (ref 70–99)
HCT VFR BLD AUTO: 35 % (ref 35–48)
HGB BLD-MCNC: 12.1 G/DL (ref 12–16)
IMM GRANULOCYTES # BLD AUTO: 0.01 X10(3) UL (ref 0–1)
IMM GRANULOCYTES NFR BLD: 0.3 %
LYMPHOCYTES # BLD AUTO: 0.68 X10(3) UL (ref 1–4)
LYMPHOCYTES NFR BLD AUTO: 18.9 %
MCH RBC QN AUTO: 30.9 PG (ref 26–34)
MCHC RBC AUTO-ENTMCNC: 34.6 G/DL (ref 31–37)
MCV RBC AUTO: 89.5 FL (ref 80–100)
MONOCYTES # BLD AUTO: 0.34 X10(3) UL (ref 0.1–1)
MONOCYTES NFR BLD AUTO: 9.4 %
NEUTROPHILS # BLD AUTO: 2.46 X10 (3) UL (ref 1.5–7.7)
NEUTROPHILS # BLD AUTO: 2.46 X10(3) UL (ref 1.5–7.7)
NEUTROPHILS NFR BLD AUTO: 68.3 %
OSMOLALITY SERPL CALC.SUM OF ELEC: 297 MOSM/KG (ref 275–295)
PLATELET # BLD AUTO: 361 10(3)UL (ref 150–450)
POTASSIUM SERPL-SCNC: 4.1 MMOL/L (ref 3.5–5.1)
PROT SERPL-MCNC: 7.1 G/DL (ref 5.7–8.2)
RBC # BLD AUTO: 3.91 X10(6)UL (ref 3.8–5.3)
SODIUM SERPL-SCNC: 131 MMOL/L (ref 136–145)
WBC # BLD AUTO: 3.6 X10(3) UL (ref 4–11)

## 2025-05-25 PROCEDURE — 36415 COLL VENOUS BLD VENIPUNCTURE: CPT

## 2025-05-25 PROCEDURE — 85025 COMPLETE CBC W/AUTO DIFF WBC: CPT

## 2025-05-25 PROCEDURE — 82962 GLUCOSE BLOOD TEST: CPT

## 2025-05-25 PROCEDURE — 99284 EMERGENCY DEPT VISIT MOD MDM: CPT

## 2025-05-25 PROCEDURE — 85025 COMPLETE CBC W/AUTO DIFF WBC: CPT | Performed by: EMERGENCY MEDICINE

## 2025-05-25 PROCEDURE — 80053 COMPREHEN METABOLIC PANEL: CPT | Performed by: EMERGENCY MEDICINE

## 2025-05-25 PROCEDURE — 80053 COMPREHEN METABOLIC PANEL: CPT

## 2025-05-25 RX ORDER — INSULIN ASPART 100 [IU]/ML
0.2 INJECTION, SOLUTION INTRAVENOUS; SUBCUTANEOUS ONCE
Status: DISCONTINUED | OUTPATIENT
Start: 2025-05-25 | End: 2025-05-26

## 2025-05-26 NOTE — ED QUICK NOTES
Pt states she does not want Insulin and wants IV DC'd, \"I'm going home.\" After a lengthy conversation with Dr Youssef. IV DC'd per AGAPITO Garcia.    Pt left without DC papers. Dr Youssef aware. Pt ambulatory on way out, steady gait

## 2025-05-26 NOTE — ED QUICK NOTES
Pt states she does not have an Insulin pump and CGM. Dr Youssef informed   WAYLON Pink! Autumn was in to see Alba and GOLDIE. She was asking about an alternative to Ozempic- feels weight loss progress has stalled- mentioned Mounjaro. She plans to discuss with her weight loss provider at their next visit- has a few months of Ozempic currently. Sometimes she feels she may have ??mild pancreatitis sx when eating more carbs, fatty foods and even alcohol. - she will continue to monitor her sx- feels they are mild and tolerable. Thanks! Kinga

## 2025-05-26 NOTE — ED PROVIDER NOTES
Patient Seen in: [facility] Emergency Department        History  Chief Complaint   Patient presents with    Hyperglycemia     Stated Complaint: hyperglycemia    Subjective:   Patient is a 40-year-old female with history of Crohn's disease and diabetes.  Patient reports that she has been having a Crohn's flare and recently had Remicade discontinued in last dose was in March.  Patient reports that she is having increased abdominal pain particular in the left lower quadrant and rectum.  She sees a GI specialist at [facility].  Patient is feels to get a insulin pump there arrange.  Patient reports that she has oral pain medication at home that she takes.  Patient reports history of allergy to Toradol and morphine.  I explained to the patient there is a alert in her chart that he says \"avoid narcotics\".  Patient is very shocked by this.  I explained to the patient I would be happy to normally order her Toradol however given this allergy member left with Tylenol or I can order home medication at that time patient declines treatment and would like to leave.  Patient reports a history of over 70 abdominal surgeries in the past.    The history is provided by the patient.                     Objective:     No pertinent past medical history.            No pertinent past surgical history.              No pertinent social history.                              Physical Exam    ED Triage Vitals [05/25/25 2104]   /62   Pulse 94   Resp 20   Temp 98.7 °F (37.1 °C)   Temp src Oral   SpO2 100 %   O2 Device None (Room air)       Current Vitals:   Vital Signs  BP: 102/62  Pulse: 94  Resp: 20  Temp: 98.7 °F (37.1 °C)  Temp src: Oral    Oxygen Therapy  SpO2: 100 %  O2 Device: None (Room air)            Physical Exam  Vitals and nursing note reviewed.   Constitutional:       General: She is not in acute distress.     Appearance: Normal appearance. She is normal weight. She is not ill-appearing, toxic-appearing or  diaphoretic.   HENT:      Head: Normocephalic and atraumatic.      Nose: Rhinorrhea present.      Mouth/Throat:      Mouth: Mucous membranes are moist.   Eyes:      Extraocular Movements: Extraocular movements intact.      Pupils: Pupils are equal, round, and reactive to light.   Cardiovascular:      Rate and Rhythm: Normal rate.   Pulmonary:      Effort: Pulmonary effort is normal.   Abdominal:      General: There is no distension.   Musculoskeletal:         General: No swelling. Normal range of motion.   Skin:     General: Skin is warm.      Capillary Refill: Capillary refill takes less than 2 seconds.   Neurological:      General: No focal deficit present.      Mental Status: She is alert and oriented to person, place, and time.   Psychiatric:         Mood and Affect: Mood normal.         Behavior: Behavior normal.                 ED Course  Labs Reviewed   CBC WITH DIFFERENTIAL WITH PLATELET - Abnormal; Notable for the following components:       Result Value    WBC 3.6 (*)     Lymphocyte Absolute 0.68 (*)     All other components within normal limits   COMP METABOLIC PANEL (14) - Abnormal; Notable for the following components:    Glucose 584 (*)     Sodium 131 (*)     BUN 8 (*)     Calculated Osmolality 297 (*)     Alkaline Phosphatase 111 (*)     Bilirubin, Total 0.2 (*)     A/G Ratio 2.1 (*)     All other components within normal limits   POCT GLUCOSE - Abnormal; Notable for the following components:    POC Glucose 588 (*)     All other components within normal limits   URINALYSIS WITH CULTURE REFLEX   ABG PANEL W ELECT AND LACTATE   RAINBOW DRAW BLUE                            MDM     Social -negative tobacco, negative etoh, negative drugs  Family History-noncontributory  Past Medical History-Crohn's disease diabetes, hidradenitis suppurative chronic abdominal pain    Differential diagnosis before testing included hyperglycemia, DKA, Crohn's flare, bowel obstruction    Co-morbidities that add to the  complexity of management include: Crohn's, diabetes    Testing ordered during this visit included baseline labs ABG CT scan    Radiographic images  Patient wanted to be discharged prior to imaging to be obtained    External chart review showed review of Care Everywhere in epic system shows on most recent hospitalization in mid May patient was hospitalized at that time she had a normal CT    History obtained by an independent source included from patient    Discussion of management with patient    Social determinants of health that affect care include no listed primary care physician      Medications Provided: IV fluids, I had ordered for insulin patient declined insulin    Course of Events during Emergency Room Visit include 40-year-old female presents emergency room for evaluation of hyperglycemia and Crohn's flare.  Explained to the patient plan for IV fluids insulin I offered patient her home oral pain medications I explained he could also give Tylenol or Motrin.  She declined the Toradol given her allergy.  Explained to the patient that due to the alert in her chart I would not be able to give her narcotics.  Patient expressed her desire to be discharged at that time her lab work thankfully does not shows evidence of DKA.  Patient states she is able to self dose her insulin at home to help bring down her sugars.  She is going to contact her doctor at [facility].          Disposition:          Discharge  I have discussed with the patient the results of test, differential diagnosis, treatment plan, warning signs and symptoms which should prompt immediate return.  They expressed understanding of these instructions and agrees to the following plan provided.  They were given written discharge instructions and agrees to return for any concerns and voiced understanding and all questions were answered.           Medical Decision Making      Disposition and Plan     Clinical Impression:  1. Hyperglycemia    2.  History of Crohn's disease    3. Hyponatremia         Disposition:  Discharge  5/25/2025 10:51 pm    Follow-up:  Joan Rosales MD  [address]  [address] [address]  [phone]    Schedule an appointment as soon as possible for a visit            Medications Prescribed:  Current Discharge Medication List                Supplementary Documentation:

## 2025-05-28 ENCOUNTER — CLINICAL ABSTRACT (OUTPATIENT)
Age: 40
End: 2025-05-28

## 2025-06-11 ENCOUNTER — HOSPITAL ENCOUNTER (EMERGENCY)
Age: 40
Discharge: LEFT BEFORE DISCHARGE | End: 2025-06-11

## 2025-06-11 PROCEDURE — 10003627 HB COUNTER ED NO SERVICE

## 2025-06-15 ENCOUNTER — HOSPITAL ENCOUNTER (EMERGENCY)
Facility: HOSPITAL | Age: 40
Discharge: LEFT BEFORE DISCHARGE | End: 2025-06-15
Attending: EMERGENCY MEDICINE
Payer: COMMERCIAL

## 2025-06-15 VITALS
RESPIRATION RATE: 18 BRPM | HEART RATE: 94 BPM | TEMPERATURE: 99 F | DIASTOLIC BLOOD PRESSURE: 77 MMHG | OXYGEN SATURATION: 98 % | WEIGHT: 140 LBS | HEIGHT: 65 IN | BODY MASS INDEX: 23.32 KG/M2 | SYSTOLIC BLOOD PRESSURE: 117 MMHG

## 2025-06-15 DIAGNOSIS — R73.9 ELEVATED RANDOM BLOOD GLUCOSE LEVEL: Primary | ICD-10-CM

## 2025-06-15 LAB
ALBUMIN SERPL-MCNC: 4.2 G/DL (ref 3.2–4.8)
ALBUMIN/GLOB SERPL: 1.9 {RATIO} (ref 1–2)
ALP LIVER SERPL-CCNC: 90 U/L (ref 37–98)
ALT SERPL-CCNC: 12 U/L (ref 10–49)
ANION GAP SERPL CALC-SCNC: 9 MMOL/L (ref 0–18)
AST SERPL-CCNC: 16 U/L (ref ?–34)
BASOPHILS # BLD AUTO: 0.02 X10(3) UL (ref 0–0.2)
BASOPHILS NFR BLD AUTO: 0.4 %
BILIRUB SERPL-MCNC: 0.2 MG/DL (ref 0.3–1.2)
BUN BLD-MCNC: 14 MG/DL (ref 9–23)
CALCIUM BLD-MCNC: 9.3 MG/DL (ref 8.7–10.6)
CHLORIDE SERPL-SCNC: 96 MMOL/L (ref 98–112)
CO2 SERPL-SCNC: 24 MMOL/L (ref 21–32)
CREAT BLD-MCNC: 1.06 MG/DL (ref 0.55–1.02)
EGFRCR SERPLBLD CKD-EPI 2021: 68 ML/MIN/1.73M2 (ref 60–?)
EOSINOPHIL # BLD AUTO: 0.02 X10(3) UL (ref 0–0.7)
EOSINOPHIL NFR BLD AUTO: 0.4 %
ERYTHROCYTE [DISTWIDTH] IN BLOOD BY AUTOMATED COUNT: 14.9 %
GLOBULIN PLAS-MCNC: 2.2 G/DL (ref 2–3.5)
GLUCOSE BLD-MCNC: 694 MG/DL (ref 70–99)
GLUCOSE BLD-MCNC: >600 MG/DL (ref 70–99)
HCT VFR BLD AUTO: 32.1 % (ref 35–48)
HGB BLD-MCNC: 10.6 G/DL (ref 12–16)
IMM GRANULOCYTES # BLD AUTO: 0.01 X10(3) UL (ref 0–1)
IMM GRANULOCYTES NFR BLD: 0.2 %
LYMPHOCYTES # BLD AUTO: 1.42 X10(3) UL (ref 1–4)
LYMPHOCYTES NFR BLD AUTO: 26.7 %
MCH RBC QN AUTO: 31 PG (ref 26–34)
MCHC RBC AUTO-ENTMCNC: 33 G/DL (ref 31–37)
MCV RBC AUTO: 93.9 FL (ref 80–100)
MONOCYTES # BLD AUTO: 0.42 X10(3) UL (ref 0.1–1)
MONOCYTES NFR BLD AUTO: 7.9 %
NEUTROPHILS # BLD AUTO: 3.42 X10 (3) UL (ref 1.5–7.7)
NEUTROPHILS # BLD AUTO: 3.42 X10(3) UL (ref 1.5–7.7)
NEUTROPHILS NFR BLD AUTO: 64.4 %
OSMOLALITY SERPL CALC.SUM OF ELEC: 302 MOSM/KG (ref 275–295)
PLATELET # BLD AUTO: 355 10(3)UL (ref 150–450)
POTASSIUM SERPL-SCNC: 4.7 MMOL/L (ref 3.5–5.1)
PROT SERPL-MCNC: 6.4 G/DL (ref 5.7–8.2)
RBC # BLD AUTO: 3.42 X10(6)UL (ref 3.8–5.3)
SODIUM SERPL-SCNC: 129 MMOL/L (ref 136–145)
WBC # BLD AUTO: 5.3 X10(3) UL (ref 4–11)

## 2025-06-15 PROCEDURE — 99284 EMERGENCY DEPT VISIT MOD MDM: CPT

## 2025-06-15 PROCEDURE — 36415 COLL VENOUS BLD VENIPUNCTURE: CPT

## 2025-06-15 PROCEDURE — 80053 COMPREHEN METABOLIC PANEL: CPT | Performed by: EMERGENCY MEDICINE

## 2025-06-15 PROCEDURE — 85025 COMPLETE CBC W/AUTO DIFF WBC: CPT | Performed by: EMERGENCY MEDICINE

## 2025-06-15 PROCEDURE — 82962 GLUCOSE BLOOD TEST: CPT

## 2025-06-15 PROCEDURE — 99283 EMERGENCY DEPT VISIT LOW MDM: CPT

## 2025-06-15 RX ORDER — ONDANSETRON 2 MG/ML
4 INJECTION INTRAMUSCULAR; INTRAVENOUS ONCE
Status: DISCONTINUED | OUTPATIENT
Start: 2025-06-15 | End: 2025-06-15

## 2025-06-15 RX ORDER — INSULIN ASPART 100 [IU]/ML
0.2 INJECTION, SOLUTION INTRAVENOUS; SUBCUTANEOUS ONCE
Status: DISCONTINUED | OUTPATIENT
Start: 2025-06-15 | End: 2025-06-15

## 2025-06-16 NOTE — ED INITIAL ASSESSMENT (HPI)
Patient states having crohns flare which causes blood sugar to go up. Hx of type 1 dm. BS elevated for a couple of days.

## 2025-06-16 NOTE — ED PROVIDER NOTES
Patient Seen in: [facility] Emergency Department        History  Chief Complaint   Patient presents with    Hyperglycemia     Stated Complaint: BG over 600 per patient, also having crohns flair    Subjective:   HPI            40-year-old female presenting to the emergency department for vomiting and grocer.  Patient states she has been having some vomiting likely due to a Crohn's flareup and she is having pain and her blood sugars have been going high prompting her visit here.  She has been to this emergency department as well as other emergency departments in the area for similar symptoms recently.  She denies any other exacerbating factors or associated symptoms.      Objective:     Past Medical History:    Chronic abdominal pain    Crohn's disease (HCC)    Drug-seeking behavior    Hidradenitis suppurativa    Type 1 diabetes mellitus (HCC)              Past Surgical History:   Procedure Laterality Date    I&d deep absc neck/chst+rib cut      Other surgical history  04/02/2025    [facility]. rectal fistula repair    Repair of anal fistula w/fibrin glue                  Social History     Socioeconomic History    Marital status:    Tobacco Use    Smoking status: Never    Smokeless tobacco: Never   Vaping Use    Vaping status: Never Used   Substance and Sexual Activity    Alcohol use: Not Currently    Drug use: Never     Social Drivers of Health     Food Insecurity: No Food Insecurity (5/17/2025)    NCSS - Food Insecurity     Worried About Running Out of Food in the Last Year: No     Ran Out of Food in the Last Year: No   Transportation Needs: No Transportation Needs (5/17/2025)    NCSS - Transportation     Lack of Transportation: No   Housing Stability: Not At Risk (5/17/2025)    NCSS - Housing/Utilities     Has Housing: Yes     Worried About Losing Housing: No     Unable to Get Utilities: No                                Physical Exam    ED Triage Vitals [06/15/25 2041]   /77   Pulse 94    Resp 18   Temp 98.6 °F (37 °C)   Temp src Oral   SpO2 98 %   O2 Device None (Room air)       Current Vitals:   Vital Signs  BP: 117/77  Pulse: 94  Resp: 18  Temp: 98.6 °F (37 °C)  Temp src: Oral    Oxygen Therapy  SpO2: 98 %  O2 Device: None (Room air)            Physical Exam  Awake alert patient appears in no distress HEENT exam is normal lungs are clear cardiovascular exam regular rhythm abdomen soft and nontender extremity no Cyanosis or edema no rash        ED Course  Labs Reviewed   POCT GLUCOSE - Abnormal; Notable for the following components:       Result Value    POC Glucose >600 (*)     All other components within normal limits   CBC WITH DIFFERENTIAL WITH PLATELET   COMP METABOLIC PANEL (14)   RAINBOW DRAW LAVENDER   RAINBOW DRAW LIGHT GREEN   RAINBOW DRAW GOLD          Differential diagnosis includes DKA, perforation                  MDM             Medical Decision Making  40-year-old female presenting to the emergency department for elevated blood glucose and Crohn's flare.  IV established cardiac monitor shows a sinus rhythm pulse ox shows no signs of hypoxia.  Insulin and IV fluids ordered antiemetic medication was also ordered.  Patient is now requesting to leave AGAINST MEDICAL ADVICE understanding that she may be in DKA.  She understands risk and benefits of this decision.  She states that since she will not receive narcotic pain medication she will leave.  She is instructed to return should she change her mind but also seek medical care for her elevated blood glucose.    Problems Addressed:  Elevated random blood glucose level: acute illness or injury    Amount and/or Complexity of Data Reviewed  Labs: ordered. Decision-making details documented in ED Course.  ECG/medicine tests: ordered and independent interpretation performed. Decision-making details documented in ED Course.        Disposition and Plan     Clinical Impression:  1. Elevated random blood glucose level          Disposition:  [facility]  6/15/2025  9:32 pm    Follow-up:  No follow-up provider specified.        Medications Prescribed:  Discharge Medication List as of 6/15/2025  9:33 PM                Supplementary Documentation:

## (undated) DEVICE — SYRINGE MED 10ML LL TIP W/O SFTY DISP

## (undated) DEVICE — GLOVE SUR 7.5 SENSICARE PI PIP GRN PWD F

## (undated) DEVICE — UNDERPANTS MAT 2XL FOR 24-64IN WAIST PUR

## (undated) DEVICE — STANDARD HYPODERMIC NEEDLE,POLYPROPYLENE HUB: Brand: MONOJECT

## (undated) DEVICE — GAUZE,SPONGE,4"X4",12PLY,STERILE,LF,2'S: Brand: MEDLINE

## (undated) DEVICE — ANTIBACTERIAL UNDYED BRAIDED (POLYGLACTIN 910), SYNTHETIC ABSORBABLE SUTURE: Brand: COATED VICRYL

## (undated) DEVICE — PAD,ABDOMINAL,8"X7.5",ST,LF,20/BX: Brand: MEDLINE INDUSTRIES, INC.

## (undated) DEVICE — PACK CDS RECTAL

## (undated) DEVICE — SLEEVE COMPR MD KNEE LEN SGL USE KENDALL SCD

## (undated) DEVICE — JELLY,LUBE,STERILE,FLIP TOP,TUBE,2-OZ: Brand: MEDLINE

## (undated) DEVICE — SOLUTION IRRIG 1000ML 0.9% NACL USP BTL

## (undated) DEVICE — GLOVE SUR 7 SENSICARE PI PIP CRM PWD F

## (undated) NOTE — LETTER
[address]  [address]  Authorization for Surgical Operation and Procedure     Date:___________                                                                                                         Time:__________  I hereby authorize Surgeon(s):  Eamon Gibson MD, my physician and his/her assistants (if applicable), which may include medical students, residents, and/or fellows, to perform the following surgical operation/ procedure and administer such anesthesia as may be determined necessary by my physician:  Operation/Procedure name (s) Procedure(s):  ANAL EXAM UNDER ANESTHESIA on Esperanza Mauro   2.   I recognize that during the surgical operation/procedure, unforeseen conditions may necessitate additional or different procedures than those listed above.  I, therefore, further authorize and request that the above-named surgeon, assistants, or designees perform such procedures as are, in their judgment, necessary and desirable.    3.   My surgeon/physician has discussed prior to my surgery the potential benefits, risks and side effects of this procedure; the likelihood of achieving goals; and potential problems that might occur during recuperation.  They also discussed reasonable alternatives to the procedure, including risks, benefits, and side effects related to the alternatives and risks related to not receiving this procedure.  I have had all my questions answered and I acknowledge that no guarantee has been made as to the result that may be obtained.    4.   Should the need arise during my operation/procedure, which includes change of level of care prior to discharge, I also consent to the administration of blood and/or blood products.  Further, I understand that despite careful testing and screening of blood or blood products by collecting agencies, I may still be subject to ill effects as a result of receiving a blood transfusion and/or blood products.  The  following are some, but not all, of the potential risks that can occur: fever and allergic reactions, hemolytic reactions, transmission of diseases such as Hepatitis, AIDS and Cytomegalovirus (CMV) and fluid overload.  In the event that I wish to have an autologous transfusion of my own blood, or a directed donor transfusion, I will discuss this with my physician.  Check only if Refusing Blood or Blood Products  I understand refusal of blood or blood products as deemed necessary by my physician may have serious consequences to my condition to include possible death. I hereby assume responsibility for my refusal and release the hospital, its personnel, and my physicians from any responsibility for the consequences of my refusal.          o  Refuse      5.   I authorize the use of any specimen, organs, tissues, body parts or foreign objects that may be removed from my body during the operation/procedure for diagnosis, research or teaching purposes and their subsequent disposal by hospital authorities.  I also authorize the release of specimen test results and/or written reports to my treating physician on the hospital medical staff or other referring or consulting physicians involved in my care, at the discretion of the Pathologist or my treating physician.    6.   I consent to the photographing or videotaping of the operations or procedures to be performed, including appropriate portions of my body for medical, scientific, or educational purposes, provided my identity is not revealed by the pictures or by descriptive texts accompanying them.  If the procedure has been photographed/videotaped, the surgeon will obtain the original picture, image, videotape or CD.  The hospital will not be responsible for storage, release or maintenance of the picture, image, tape or CD.    7.   I consent to the presence of a  or observers in the operating room as deemed necessary by my physician or their designees.     8.   I recognize that in the event my procedure results in extended X-Ray/fluoroscopy time, I may develop a skin reaction.    9. If I have a Do Not Attempt Resuscitation (DNAR) order in place, that status will be suspended while in the operating room, procedural suite, and during the recovery period unless otherwise explicitly stated by me (or a person authorized to consent on my behalf). The surgeon or my attending physician will determine when the applicable recovery period ends for purposes of reinstating the DNAR order.  10. Patients having a sterilization procedure: I understand that if the procedure is successful the results will be permanent and it will therefore be impossible for me to inseminate, conceive, or bear children.  I also understand that the procedure is intended to result in sterility, although the result has not been guaranteed.   11. I acknowledge that my physician has explained sedation/analgesia administration to me including the risk and benefits I consent to the administration of sedation/analgesia as may be necessary or desirable in the judgment of my physician.    I CERTIFY THAT I HAVE READ AND FULLY UNDERSTAND THE ABOVE CONSENT TO OPERATION and/or OTHER PROCEDURE.    _________________________________________  __________________________________  Signature of Patient     Signature of Responsible Person         ___________________________________         Printed Name of Responsible Person           _________________________________                 Relationship to Patient  _________________________________________  ______________________________  Signature of Witness          Date  Time      Patient Name: Esperanza GREGORY Nickieoneil     : 1985                 Printed: April 10, 2025     Medical Record #: [id number]                     Page 1 of [address] [place] [address] [place]  [address]    Consent for Anesthesia    I, Esperanza Mauro agree  to be cared for by an anesthesiologist, who is specially trained to monitor me and give me medicine to put me to sleep or keep me comfortable during my procedure    I understand that my anesthesiologist is not an employee or agent of [facility] or [employer]. He or she works for [employer].    As the patient asking for anesthesia services, I agree to:  Allow the anesthesiologist (anesthesia doctor) to give me medicine and do additional procedures as necessary. Some examples are: Starting or using an “IV” to give me medicine, fluids or blood during my procedure, and having a breathing tube placed to help me breathe when I’m asleep (intubation). In the event that my heart stops working properly, I understand that my anesthesiologist will make every effort to sustain my life, unless otherwise directed by [facility] Do Not Resuscitate documents.  Tell my anesthesia doctor before my procedure:  If I am pregnant.  The last time that I ate or drank.  All of the medicines I take (including prescriptions, herbal supplements, and pills I can buy without a prescription (including street drugs/illegal medications). Failure to inform my anesthesiologist about these medicines may increase my risk of anesthetic complications.  If I am allergic to anything or have had a reaction to anesthesia before.  I understand how the anesthesia medicine will help me (benefits).  I understand that with any type of anesthesia medicine there are risks:  The most common risks are: nausea, vomiting, sore throat, muscle soreness, damage to my eyes, mouth, or teeth (from breathing tube placement).  Rare risks include: remembering what happened during my procedure, allergic reactions to medications, injury to my airway, heart, lungs, vision, nerves, or muscles and in extremely rare instances death.  My doctor has explained to me other choices available to me for my care (alternatives).  Pregnant Patients  (“epidural”):  I understand that the risks of having an epidural (medicine given into my back to help control pain during labor), include itching, low blood pressure, difficulty urinating, headache or slowing of the baby’s heart. Very rare risks include infection, bleeding, seizure, irregular heart rhythms and nerve injury.  Regional Anesthesia (“spinal”, “epidural”, & “nerve blocks”):  I understand that rare but potential complications include headache, bleeding, infection, seizure, irregular heart rhythms, and nerve injury.    I can change my mind about having anesthesia services at any time before I get the medicine.    _____________________________________________________________________________  Patient (or Representative) Signature/Relationship to Patient  Date   Time    _____________________________________________________________________________   Name (if used)    Language/Organization   Time    _____________________________________________________________________________  Anesthesiologist Signature     Date   Time  I have discussed the procedure and information above with the patient (or patient’s representative) and answered their questions. The patient or their representative has agreed to have anesthesia services.    _____________________________________________________________________________  Witness        Date   Time  I have verified that the signature is that of the patient or patient’s representative, and that it was signed before the procedure  Patient Name: Esperanza GREGORY Nickieoneil     : 1985                 Printed: April 10, 2025     Medical Record #: [id number]                     Page 2 of 2